# Patient Record
Sex: FEMALE | Race: WHITE | NOT HISPANIC OR LATINO | Employment: FULL TIME | URBAN - METROPOLITAN AREA
[De-identification: names, ages, dates, MRNs, and addresses within clinical notes are randomized per-mention and may not be internally consistent; named-entity substitution may affect disease eponyms.]

---

## 2017-03-16 PROBLEM — Z01.419 WELL WOMAN EXAM WITH ROUTINE GYNECOLOGICAL EXAM: Chronic | Status: ACTIVE | Noted: 2017-03-16

## 2017-06-06 ENCOUNTER — OFFICE VISIT (OUTPATIENT)
Dept: INTERNAL MEDICINE | Facility: CLINIC | Age: 37
End: 2017-06-06

## 2017-06-06 VITALS
BODY MASS INDEX: 39.71 KG/M2 | HEIGHT: 67 IN | SYSTOLIC BLOOD PRESSURE: 116 MMHG | DIASTOLIC BLOOD PRESSURE: 72 MMHG | WEIGHT: 253 LBS | TEMPERATURE: 97.4 F

## 2017-06-06 DIAGNOSIS — N80.9 ENDOMETRIOSIS: ICD-10-CM

## 2017-06-06 PROCEDURE — 99213 OFFICE O/P EST LOW 20 MIN: CPT | Performed by: FAMILY MEDICINE

## 2017-06-06 RX ORDER — CYCLOBENZAPRINE HCL 5 MG
5 TABLET ORAL 2 TIMES DAILY PRN
Qty: 60 TABLET | Refills: 5 | Status: SHIPPED | OUTPATIENT
Start: 2017-06-06 | End: 2018-06-08 | Stop reason: SDUPTHER

## 2017-06-06 RX ORDER — IBUPROFEN 800 MG/1
800 TABLET ORAL EVERY 6 HOURS PRN
Qty: 90 TABLET | Refills: 1 | Status: SHIPPED | OUTPATIENT
Start: 2017-06-06 | End: 2017-09-14 | Stop reason: SDUPTHER

## 2017-06-06 RX ORDER — TRAMADOL HYDROCHLORIDE 50 MG/1
50 TABLET ORAL EVERY 6 HOURS PRN
Qty: 90 TABLET | Refills: 3 | Status: SHIPPED | OUTPATIENT
Start: 2017-06-06 | End: 2017-09-14 | Stop reason: SDUPTHER

## 2017-06-06 RX ORDER — CLOTRIMAZOLE AND BETAMETHASONE DIPROPIONATE 10; .64 MG/G; MG/G
CREAM TOPICAL 2 TIMES DAILY
Qty: 45 G | Refills: 0 | Status: SHIPPED | OUTPATIENT
Start: 2017-06-06 | End: 2018-06-08 | Stop reason: SDUPTHER

## 2017-06-06 NOTE — PATIENT INSTRUCTIONS
1. GYN- endometriosis- discussed that the flexeril is beneficial both directly and indirectly. Will reduce the qty from 90 to 60 but she is to continue to use it as needed. Discussed that the exercise is likely improving her back and that is why she is not needing the flexeril as much as well. Will increase her tramadol qty due to the dental issues. She will be working on this over the next several months. Will write for the 800mg ibu for ease of use. Pt to cut the dose down again as soon as possible due to her stomach hx. She is reminded to start the pepcid any time she gets any GI symptoms and then to stay on the pepcid as long as she is taking the high dose of ibu.  2. RECHECK- 4mo CPE

## 2017-06-06 NOTE — PROGRESS NOTES
Subjective   April Cameron is a 37 y.o. female.     History of Present Illness   Here for routine 6mo recheck endometriosis. Last seen 12/13/16 for recheck endometriosis. Was seen 8/25/16 for CPE. Was seen 3/14/16 for ER recheck GI. ER notes 3/13/16 demo CT abd demo slightly distended gallbladder with possible wall thickening. U/S demo no gallbladder disease, + fatty liver. Neg HCG, normal CMP (except glu 124), lipase, UA.     1.GI- gastritis, NSAID induced. Improved since taking Pepcid with the prn ibu. Today she reports she has not needed any pepcid.      2.GYN- had CPE 8/25/16   Last pap: 5/2015. No h/o abnormal paps.   Discussed probable endometriosis. Pt did not go to Gyn as she found she improved adequately with prn flexeril, ibu and tramadol. Was also tried on OCP (roseline) but had emotional side effects. At last discussion, she had been able to reduce the ibu from 1500mg qd to 400mg qd prn menstruation problems. Was continued on the ibu, flexeril and tramadol combo prn. Today pt reports she is still doing well with this combo. Is not needing the flexeril very much; was taking it qhs and now is down to 1-2x/wk except when on her period and then takes it qhs. Is exercising and getting her weight down. Has lost 5 lb and is trimming. She has had some dental issues with an exposed nerve and was in process of getting a crown and was not able to complete this. Got infected and just finished abx. Declined pain meds due to the tramadol. Is taking more advil as well. Was given 800mg by dentist.    The following portions of the patient's history were reviewed and updated as appropriate: current medications, past family history, past medical history, past social history, past surgical history and problem list.    Review of Systems   Cardiovascular: Negative for chest pain.   Gastrointestinal: Negative for abdominal distention and abdominal pain.   Skin: Negative for color change.   Neurological: Negative for tremors, speech  "difficulty and headaches.   Psychiatric/Behavioral: Negative for agitation and confusion.   All other systems reviewed and are negative.        Current Outpatient Prescriptions:   •  betamethasone dipropionate (DIPROLENE) 0.05 % cream, Apply 1 gm sparingly to affected area(s) twice daily, Disp: , Rfl:   •  clotrimazole-betamethasone (LOTRISONE) 1-0.05 % cream, Apply  topically 2 (Two) Times a Day. Apply sparingly to the affected area(s) twice daily, Disp: 45 g, Rfl: 0  •  cyclobenzaprine (FLEXERIL) 5 MG tablet, Take 1 tablet by mouth 2 (Two) Times a Day As Needed (back pain). Muscle spasm, Disp: 60 tablet, Rfl: 5  •  famotidine (PEPCID) 20 MG tablet, Take 1 tablet by mouth daily. X 2 weeks and then as needed for stomach acid, Disp: 30 tablet, Rfl: 5  •  ibuprofen (ADVIL,MOTRIN) 800 MG tablet, Take 1 tablet by mouth Every 6 (Six) Hours As Needed for Mild Pain (1-3)., Disp: 90 tablet, Rfl: 1  •  traMADol (ULTRAM) 50 MG tablet, Take 1 tablet by mouth Every 6 (Six) Hours As Needed for Moderate Pain (4-6) (with periods)., Disp: 90 tablet, Rfl: 3    Objective     /72  Temp 97.4 °F (36.3 °C)  Ht 67\" (170.2 cm)  Wt 253 lb (115 kg)  BMI 39.63 kg/m2    Physical Exam   Constitutional: She is oriented to person, place, and time. She appears well-developed and well-nourished.   HENT:   Right Ear: Tympanic membrane and ear canal normal.   Left Ear: Tympanic membrane and ear canal normal.   Mouth/Throat: Oropharynx is clear and moist.   Eyes: Conjunctivae and EOM are normal. Pupils are equal, round, and reactive to light.   Neck: No thyromegaly present.   Cardiovascular: Normal rate and regular rhythm.    Pulmonary/Chest: Effort normal and breath sounds normal.   Neurological: She is alert and oriented to person, place, and time.   Skin: Skin is warm and dry.   Psychiatric: She has a normal mood and affect.   Vitals reviewed.      Assessment/Plan   April was seen today for follow-up.    Diagnoses and all orders for this " visit:    Endometriosis  -     cyclobenzaprine (FLEXERIL) 5 MG tablet; Take 1 tablet by mouth 2 (Two) Times a Day As Needed (back pain). Muscle spasm  -     ibuprofen (ADVIL,MOTRIN) 800 MG tablet; Take 1 tablet by mouth Every 6 (Six) Hours As Needed for Mild Pain (1-3).  -     traMADol (ULTRAM) 50 MG tablet; Take 1 tablet by mouth Every 6 (Six) Hours As Needed for Moderate Pain (4-6) (with periods).    Other orders  -     clotrimazole-betamethasone (LOTRISONE) 1-0.05 % cream; Apply  topically 2 (Two) Times a Day. Apply sparingly to the affected area(s) twice daily      1. GYN- endometriosis- discussed that the flexeril is beneficial both directly and indirectly. Will reduce the qty from 90 to 60 but she is to continue to use it as needed. Discussed that the exercise is likely improving her back and that is why she is not needing the flexeril as much as well. Will increase her tramadol qty due to the dental issues. She will be working on this over the next several months. Will write for the 800mg ibu for ease of use. Pt to cut the dose down again as soon as possible due to her stomach hx. She is reminded to start the pepcid any time she gets any GI symptoms and then to stay on the pepcid as long as she is taking the high dose of ibu.  2. RECHECK- 4mo CPE

## 2017-09-14 ENCOUNTER — OFFICE VISIT (OUTPATIENT)
Dept: INTERNAL MEDICINE | Facility: CLINIC | Age: 37
End: 2017-09-14

## 2017-09-14 VITALS
SYSTOLIC BLOOD PRESSURE: 116 MMHG | TEMPERATURE: 97.7 F | BODY MASS INDEX: 38.99 KG/M2 | WEIGHT: 248.4 LBS | DIASTOLIC BLOOD PRESSURE: 74 MMHG | HEIGHT: 67 IN

## 2017-09-14 DIAGNOSIS — N80.9 ENDOMETRIOSIS: ICD-10-CM

## 2017-09-14 PROCEDURE — 99395 PREV VISIT EST AGE 18-39: CPT | Performed by: FAMILY MEDICINE

## 2017-09-14 RX ORDER — TRAMADOL HYDROCHLORIDE 50 MG/1
50 TABLET ORAL EVERY 6 HOURS PRN
Qty: 90 TABLET | Refills: 3 | Status: SHIPPED | OUTPATIENT
Start: 2017-09-14 | End: 2018-01-11 | Stop reason: SDUPTHER

## 2017-09-14 RX ORDER — CLINDAMYCIN HYDROCHLORIDE 300 MG/1
CAPSULE ORAL
COMMUNITY
Start: 2017-06-21 | End: 2018-01-11

## 2017-09-14 RX ORDER — IBUPROFEN 800 MG/1
800 TABLET ORAL EVERY 6 HOURS PRN
Qty: 90 TABLET | Refills: 1 | Status: SHIPPED | OUTPATIENT
Start: 2017-09-14 | End: 2018-01-11 | Stop reason: SDUPTHER

## 2017-09-14 NOTE — PROGRESS NOTES
Subjective   Alena Barnes is a 37 y.o. female.   History of Present Illness   Here for CPE. Last seen 6/6/17 for routine 6mo recheck endometriosis. Was seen 8/25/16 for CPE. Was seen 3/14/16 for ER recheck GI. ER notes 3/13/16 demo CT abd demo slightly distended gallbladder with possible wall thickening. U/S demo no gallbladder disease, + fatty liver. Neg HCG, normal CMP (except glu 124), lipase, UA.     1.GI- gastritis, NSAID induced. Improved since taking Pepcid with the prn ibu.     2.GYN- had CPE 8/25/16   Last pap: 5/2015. No h/o abnormal paps.   LMP: 8/25/17. Discussed probable endometriosis. Pt has done well with flexeril, ibu and tramadol. Has noted tolerated OCP (emotional with last trial of roseline). Was doing well at last discussion. Today she reports she is still doing well. Gets sick but stays functional except for 1 day. Is still using the tramadol for dental issues, still has 2 root canals to go.  No current vaginal or breast c/o. Is having minor incontinence that started post partum.   No fam hx breast ca. Aunt had bilat mastectomy due to fibrocystic breast.   Last TDaP: with her pregnancy 2014   CONCERNS- is dieting and has lost down from 259 to 248 since 12/2016.     The following portions of the patient's history were reviewed and updated as appropriate: current medications, past family history, past medical history, past social history, past surgical history and problem list.    Review of Systems   Cardiovascular: Negative for chest pain.   Gastrointestinal: Negative for abdominal distention and abdominal pain.   Skin: Negative for color change.   Neurological: Negative for tremors, speech difficulty and headaches.   Psychiatric/Behavioral: Negative for agitation and confusion.   All other systems reviewed and are negative.        Current Outpatient Prescriptions:   •  betamethasone dipropionate (DIPROLENE) 0.05 % cream, Apply 1 gm sparingly to affected area(s) twice daily, Disp: , Rfl:   •   "clindamycin (CLEOCIN) 300 MG capsule, , Disp: , Rfl:   •  clotrimazole-betamethasone (LOTRISONE) 1-0.05 % cream, Apply  topically 2 (Two) Times a Day. Apply sparingly to the affected area(s) twice daily, Disp: 45 g, Rfl: 0  •  cyclobenzaprine (FLEXERIL) 5 MG tablet, Take 1 tablet by mouth 2 (Two) Times a Day As Needed (back pain). Muscle spasm, Disp: 60 tablet, Rfl: 5  •  famotidine (PEPCID) 20 MG tablet, Take 1 tablet by mouth daily. X 2 weeks and then as needed for stomach acid, Disp: 30 tablet, Rfl: 5  •  ibuprofen (ADVIL,MOTRIN) 800 MG tablet, Take 1 tablet by mouth Every 6 (Six) Hours As Needed for Mild Pain ., Disp: 90 tablet, Rfl: 1  •  traMADol (ULTRAM) 50 MG tablet, Take 1 tablet by mouth Every 6 (Six) Hours As Needed for Moderate Pain  (with periods)., Disp: 90 tablet, Rfl: 3    Objective     /74  Temp 97.7 °F (36.5 °C)  Ht 67\" (170.2 cm)  Wt 248 lb 6.4 oz (113 kg)  BMI 38.9 kg/m2    Physical Exam   Constitutional: She is oriented to person, place, and time. She appears well-developed and well-nourished.   HENT:   Head: Normocephalic.   Right Ear: Tympanic membrane and ear canal normal.   Left Ear: Tympanic membrane and ear canal normal.   Nose: Nose normal.   Mouth/Throat: Oropharynx is clear and moist.   Eyes: Conjunctivae and EOM are normal. Pupils are equal, round, and reactive to light.   Neck: Normal range of motion. Neck supple. No thyromegaly present.   Cardiovascular: Normal rate, regular rhythm and normal heart sounds.    Pulmonary/Chest: Effort normal and breath sounds normal. Right breast exhibits no mass, no nipple discharge and no skin change. Left breast exhibits no mass, no nipple discharge and no skin change.   Abdominal: Soft. Bowel sounds are normal. She exhibits no distension. There is no tenderness.   Genitourinary: Vagina normal.   Musculoskeletal: Normal range of motion.   Lymphadenopathy:     She has no cervical adenopathy.   Neurological: She is alert and oriented to " person, place, and time.   Skin: Skin is warm and dry.   Psychiatric: She has a normal mood and affect. Her behavior is normal.   Nursing note and vitals reviewed.      Reviewed the following with the patient: Discussion today with patient regarding current guidelines for timing/ frequency of paps, mammograms, colonoscopy (or cologuard), bone density tests and lab tests.     Immunizations discussed today with current recommendations advised for DTaP, Zostavax, Pneumovax and Prevnar 13.    Appropriate diet and stretching discussed with handouts provided.      Assessment/Plan   April was seen today for annual exam.    Diagnoses and all orders for this visit:    Endometriosis  -     traMADol (ULTRAM) 50 MG tablet; Take 1 tablet by mouth Every 6 (Six) Hours As Needed for Moderate Pain  (with periods).  -     ibuprofen (ADVIL,MOTRIN) 800 MG tablet; Take 1 tablet by mouth Every 6 (Six) Hours As Needed for Mild Pain .      1. CPE- pap due next year. RF meds for endometriosis today. Will start labs next   2. RECHECK- 6mo routine

## 2017-09-14 NOTE — PATIENT INSTRUCTIONS
1. CPE- pap due next year. RF meds for endometriosis today. Will start labs next   2. RECHECK- 6mo routine

## 2018-01-11 ENCOUNTER — OFFICE VISIT (OUTPATIENT)
Dept: INTERNAL MEDICINE | Facility: CLINIC | Age: 38
End: 2018-01-11

## 2018-01-11 VITALS
DIASTOLIC BLOOD PRESSURE: 84 MMHG | HEIGHT: 67 IN | TEMPERATURE: 97.3 F | WEIGHT: 257.2 LBS | SYSTOLIC BLOOD PRESSURE: 122 MMHG | BODY MASS INDEX: 40.37 KG/M2

## 2018-01-11 DIAGNOSIS — N80.9 ENDOMETRIOSIS: Primary | ICD-10-CM

## 2018-01-11 PROBLEM — Z01.419 WELL WOMAN EXAM WITH ROUTINE GYNECOLOGICAL EXAM: Chronic | Status: RESOLVED | Noted: 2017-03-16 | Resolved: 2018-01-11

## 2018-01-11 PROCEDURE — 99213 OFFICE O/P EST LOW 20 MIN: CPT | Performed by: FAMILY MEDICINE

## 2018-01-11 RX ORDER — IBUPROFEN 800 MG/1
800 TABLET ORAL EVERY 6 HOURS PRN
Qty: 90 TABLET | Refills: 3 | Status: SHIPPED | OUTPATIENT
Start: 2018-01-11 | End: 2018-06-08 | Stop reason: SDUPTHER

## 2018-01-11 RX ORDER — TRAMADOL HYDROCHLORIDE 50 MG/1
50 TABLET ORAL EVERY 6 HOURS PRN
Qty: 90 TABLET | Refills: 5 | Status: SHIPPED | OUTPATIENT
Start: 2018-01-11 | End: 2018-06-08 | Stop reason: SDUPTHER

## 2018-01-11 NOTE — PROGRESS NOTES
Subjective   April Cameron is a 37 y.o. female.     History of Present Illness   Here for 6mo routine. Last seen 9/14/17 for CPE. Was seen 6/6/17 for routine 6mo recheck endometriosis.Was seen 3/14/16 for ER recheck GI. ER notes 3/13/16 demo CT abd demo slightly distended gallbladder with possible wall thickening. U/S demo no gallbladder disease, + fatty liver. Neg HCG, normal CMP (except glu 124), lipase, UA.     GI- gastritis, NSAID induced. Improved since taking Pepcid with the prn ibu.     GYN- endometriosis. Currently on ibu, flexeril and tramadol. Pt has been able to reduce the ibu from 1500mg qd to 400mg qd prn menstruation problems since on flexeril and tramadol. GI issues have improved with this. Has also taken the flexeril for back pain, usually 1-2x/wk. Has been gradually improving with exercise and weight loss. Was back up to 800mg ibu at last visit due to dental problems. Was reminded to use the Pepcid with this prn. Today pt reports she is on her period today. Is having the body aches, etc. Is out of tramadol. Does not feel she is acutely sick. Overall is still improved. On additional discussion, she has tried most OCP, including progesterone only. Has a h/o cystic acne, treated with acutane in past. Does get rare acne now.    ORTHO- pt has ganglion at 1st metacarpal left hand. Education provided.     The following portions of the patient's history were reviewed and updated as appropriate: current medications, past family history, past medical history, past social history, past surgical history and problem list.    Review of Systems   Cardiovascular: Negative for chest pain.   Gastrointestinal: Negative for abdominal distention and abdominal pain.   Skin: Negative for color change.   Neurological: Negative for tremors, speech difficulty and headaches.   Psychiatric/Behavioral: Negative for agitation and confusion.   All other systems reviewed and are negative.        Current Outpatient Prescriptions:  "  •  betamethasone dipropionate (DIPROLENE) 0.05 % cream, Apply 1 gm sparingly to affected area(s) twice daily, Disp: , Rfl:   •  clotrimazole-betamethasone (LOTRISONE) 1-0.05 % cream, Apply  topically 2 (Two) Times a Day. Apply sparingly to the affected area(s) twice daily, Disp: 45 g, Rfl: 0  •  cyclobenzaprine (FLEXERIL) 5 MG tablet, Take 1 tablet by mouth 2 (Two) Times a Day As Needed (back pain). Muscle spasm, Disp: 60 tablet, Rfl: 5  •  famotidine (PEPCID) 20 MG tablet, Take 1 tablet by mouth daily. X 2 weeks and then as needed for stomach acid, Disp: 30 tablet, Rfl: 5  •  ibuprofen (ADVIL,MOTRIN) 800 MG tablet, Take 1 tablet by mouth Every 6 (Six) Hours As Needed for Mild Pain ., Disp: 90 tablet, Rfl: 1  •  traMADol (ULTRAM) 50 MG tablet, Take 1 tablet by mouth Every 6 (Six) Hours As Needed for Moderate Pain  (with periods)., Disp: 90 tablet, Rfl: 5    Objective     /84  Temp 97.3 °F (36.3 °C)  Ht 170.2 cm (67\")  Wt 117 kg (257 lb 3.2 oz)  BMI 40.28 kg/m2    Physical Exam   Constitutional: She is oriented to person, place, and time. She appears well-developed and well-nourished.   HENT:   Right Ear: Tympanic membrane and ear canal normal.   Left Ear: Tympanic membrane and ear canal normal.   Mouth/Throat: Oropharynx is clear and moist.   Eyes: Conjunctivae and EOM are normal. Pupils are equal, round, and reactive to light.   Neck: No thyromegaly present.   Cardiovascular: Normal rate and regular rhythm.    Pulmonary/Chest: Effort normal and breath sounds normal.   Neurological: She is alert and oriented to person, place, and time.   Skin: Skin is warm and dry.   Psychiatric: She has a normal mood and affect.   Vitals reviewed.      Assessment/Plan   April was seen today for follow-up.    Diagnoses and all orders for this visit:    Endometriosis  -     traMADol (ULTRAM) 50 MG tablet; Take 1 tablet by mouth Every 6 (Six) Hours As Needed for Moderate Pain  (with periods).      1. GYN- endometriosis- " stable. Will RF tramadol and ibu today. Also discussed spironolactone as an option. Discussed that this would be more for her skin issues but could be helpful. If she decides she wants to try this, she can call for a Rx and then we will keep her CPE.  2. RECHECK- 6mo CPE

## 2018-01-11 NOTE — PATIENT INSTRUCTIONS
1. GYN- endometriosis- stable. Will RF tramadol and ibu today. Also discussed spironolactone as an option. Discussed that this would be more for her skin issues but could be helpful. If she decides she wants to try this, she can call for a Rx and then we will keep her CPE.  2. RECHECK- 6mo CPE

## 2018-02-23 ENCOUNTER — TELEPHONE (OUTPATIENT)
Dept: INTERNAL MEDICINE | Facility: CLINIC | Age: 38
End: 2018-02-23

## 2018-02-23 RX ORDER — OSELTAMIVIR PHOSPHATE 75 MG/1
75 CAPSULE ORAL DAILY
Qty: 10 CAPSULE | Refills: 0 | Status: SHIPPED | OUTPATIENT
Start: 2018-02-23 | End: 2018-03-05

## 2018-02-23 NOTE — TELEPHONE ENCOUNTER
PATIENTS DAUGHTER TESTED POSITIVE FOR FLU B AND SHE WANTED TO KNOW IF YOU COULD CALL HER IN SOME TAMIFLU?

## 2018-02-23 NOTE — TELEPHONE ENCOUNTER
Per NAHUM I have sent in Tamiflu via erx to pharmacy. Pharmacy will notify pt when ready for . DENISSE

## 2018-06-08 ENCOUNTER — OFFICE VISIT (OUTPATIENT)
Dept: INTERNAL MEDICINE | Facility: CLINIC | Age: 38
End: 2018-06-08

## 2018-06-08 VITALS
BODY MASS INDEX: 39.68 KG/M2 | TEMPERATURE: 97.8 F | DIASTOLIC BLOOD PRESSURE: 72 MMHG | SYSTOLIC BLOOD PRESSURE: 118 MMHG | WEIGHT: 252.8 LBS | HEIGHT: 67 IN

## 2018-06-08 DIAGNOSIS — L30.9 ECZEMA, UNSPECIFIED TYPE: ICD-10-CM

## 2018-06-08 DIAGNOSIS — N80.9 ENDOMETRIOSIS: Primary | ICD-10-CM

## 2018-06-08 PROCEDURE — 99213 OFFICE O/P EST LOW 20 MIN: CPT | Performed by: FAMILY MEDICINE

## 2018-06-08 RX ORDER — IBUPROFEN 800 MG/1
800 TABLET ORAL EVERY 6 HOURS PRN
Qty: 90 TABLET | Refills: 3 | Status: SHIPPED | OUTPATIENT
Start: 2018-06-08 | End: 2018-10-26 | Stop reason: SDUPTHER

## 2018-06-08 RX ORDER — TRAMADOL HYDROCHLORIDE 50 MG/1
50 TABLET ORAL EVERY 6 HOURS PRN
Qty: 90 TABLET | Refills: 3 | Status: SHIPPED | OUTPATIENT
Start: 2018-06-08 | End: 2018-10-26 | Stop reason: SDUPTHER

## 2018-06-08 RX ORDER — CLOTRIMAZOLE AND BETAMETHASONE DIPROPIONATE 10; .64 MG/G; MG/G
CREAM TOPICAL EVERY 12 HOURS SCHEDULED
Qty: 45 G | Refills: 0 | Status: SHIPPED | OUTPATIENT
Start: 2018-06-08 | End: 2018-10-26 | Stop reason: SDUPTHER

## 2018-06-08 RX ORDER — CYCLOBENZAPRINE HCL 5 MG
5 TABLET ORAL 2 TIMES DAILY PRN
Qty: 60 TABLET | Refills: 3 | Status: SHIPPED | OUTPATIENT
Start: 2018-06-08 | End: 2018-10-26 | Stop reason: SDUPTHER

## 2018-06-08 NOTE — PATIENT INSTRUCTIONS
1. GYN- endometriosis- stable. RF meds x4mo today and then will do 6mo at her CPE  2. DERM- discussed candida vs tinea vs eczema- advised Gold Bond or Desitin for yeast prevention. Advised topical steroid and olive oil for dry skin or eczema.   3. RECHECK- will postpone her CPE from Sept to Oct.

## 2018-06-08 NOTE — PROGRESS NOTES
Subjective   April Cameron is a 38 y.o. female.     History of Present Illness   Here for 6mo routine. Last seen 1/11/18 for routine. Was seen 9/14/17 for CPE. Was seen 6/6/17 for routine 6mo recheck endometriosis.Was seen 3/14/16 for ER recheck GI. ER notes 3/13/16 demo CT abd demo slightly distended gallbladder with possible wall thickening. U/S demo no gallbladder disease, + fatty liver. Neg HCG, normal CMP (except glu 124), lipase, UA.     GI- gastritis, NSAID induced. Improved since taking Pepcid with the prn ibu.     GYN- endometriosis. Intolerant to OCP. Currently on periodic ibu, flexeril and tramadol. Pt has been able to reduce the ibu from 1500mg qd to 400mg qd prn menstruation problems since on flexeril and tramadol. GI issues have improved with this. Has also taken the flexeril prn back pain, usually 1-2x/wk. Has been gradually improving with exercise and weight loss. Was out of tramadol at her last visit and not doing well. Was given RF x6mo.  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 Will be getting insurance as of October. Her medicaid will stop before then. She is doing well since she is back on the combo tramadol, ibu 800 and flexeril.     The following portions of the patient's history were reviewed and updated as appropriate: current medications, past family history, past medical history, past social history, past surgical history and problem list.    Review of Systems   Cardiovascular: Negative for chest pain.   Gastrointestinal: Negative for abdominal distention and abdominal pain.   Skin: Negative for color change.   Neurological: Negative for tremors, speech difficulty and headaches.   Psychiatric/Behavioral: Negative for agitation and confusion.   All other systems reviewed and are negative.        Current Outpatient Prescriptions:   •  betamethasone dipropionate (DIPROLENE) 0.05 % cream, Apply 1 gm sparingly to affected area(s) twice daily, Disp: , Rfl:   •   "clotrimazole-betamethasone (LOTRISONE) 1-0.05 % cream, Apply  topically Every 12 (Twelve) Hours. Apply sparingly to the affected area(s) twice daily, Disp: 45 g, Rfl: 0  •  cyclobenzaprine (FLEXERIL) 5 MG tablet, Take 1 tablet by mouth 2 (Two) Times a Day As Needed (back pain). Muscle spasm, Disp: 60 tablet, Rfl: 3  •  famotidine (PEPCID) 20 MG tablet, Take 1 tablet by mouth daily. X 2 weeks and then as needed for stomach acid, Disp: 30 tablet, Rfl: 5  •  ibuprofen (ADVIL,MOTRIN) 800 MG tablet, Take 1 tablet by mouth Every 6 (Six) Hours As Needed for Mild Pain ., Disp: 90 tablet, Rfl: 3  •  traMADol (ULTRAM) 50 MG tablet, Take 1 tablet by mouth Every 6 (Six) Hours As Needed for Moderate Pain  (with periods)., Disp: 90 tablet, Rfl: 3    Objective     /72   Temp 97.8 °F (36.6 °C)   Ht 170.2 cm (67\")   Wt 115 kg (252 lb 12.8 oz)   BMI 39.59 kg/m²     Physical Exam   Constitutional: She is oriented to person, place, and time. She appears well-developed and well-nourished.   HENT:   Right Ear: Tympanic membrane and ear canal normal.   Left Ear: Tympanic membrane and ear canal normal.   Mouth/Throat: Oropharynx is clear and moist.   Eyes: Conjunctivae and EOM are normal. Pupils are equal, round, and reactive to light.   Neck: No thyromegaly present.   Cardiovascular: Normal rate and regular rhythm.    Pulmonary/Chest: Effort normal and breath sounds normal.   Neurological: She is alert and oriented to person, place, and time.   Skin: Skin is warm and dry.   Psychiatric: She has a normal mood and affect.   Vitals reviewed.      Assessment/Plan   April was seen today for follow-up.    Diagnoses and all orders for this visit:    Endometriosis  -     cyclobenzaprine (FLEXERIL) 5 MG tablet; Take 1 tablet by mouth 2 (Two) Times a Day As Needed (back pain). Muscle spasm  -     traMADol (ULTRAM) 50 MG tablet; Take 1 tablet by mouth Every 6 (Six) Hours As Needed for Moderate Pain  (with periods).  -     ibuprofen " (ADVIL,MOTRIN) 800 MG tablet; Take 1 tablet by mouth Every 6 (Six) Hours As Needed for Mild Pain .    Eczema, unspecified type  -     clotrimazole-betamethasone (LOTRISONE) 1-0.05 % cream; Apply  topically Every 12 (Twelve) Hours. Apply sparingly to the affected area(s) twice daily        1. GYN- endometriosis- stable. RF meds x4mo today and then will do 6mo at her CPE  2. DERM- discussed candida vs tinea vs eczema- advised Gold Bond or Desitin for yeast prevention. Advised topical steroid and olive oil for dry skin or eczema.   3. RECHECK- will postpone her CPE from Sept to Oct.

## 2018-10-26 ENCOUNTER — OFFICE VISIT (OUTPATIENT)
Dept: INTERNAL MEDICINE | Facility: CLINIC | Age: 38
End: 2018-10-26

## 2018-10-26 VITALS
DIASTOLIC BLOOD PRESSURE: 76 MMHG | TEMPERATURE: 98.1 F | HEIGHT: 67 IN | WEIGHT: 259 LBS | BODY MASS INDEX: 40.65 KG/M2 | SYSTOLIC BLOOD PRESSURE: 120 MMHG

## 2018-10-26 DIAGNOSIS — N80.9 ENDOMETRIOSIS: ICD-10-CM

## 2018-10-26 DIAGNOSIS — Z00.00 ANNUAL PHYSICAL EXAM: Primary | ICD-10-CM

## 2018-10-26 DIAGNOSIS — L30.9 ECZEMA, UNSPECIFIED TYPE: ICD-10-CM

## 2018-10-26 DIAGNOSIS — N90.89 SKIN TAG OF VULVA: ICD-10-CM

## 2018-10-26 LAB
25(OH)D3 SERPL-MCNC: 6 NG/ML
ALBUMIN SERPL-MCNC: 4.55 G/DL (ref 3.2–4.8)
ALBUMIN/GLOB SERPL: 2.2 G/DL (ref 1.5–2.5)
ALP SERPL-CCNC: 77 U/L (ref 25–100)
ALT SERPL W P-5'-P-CCNC: 22 U/L (ref 7–40)
ANION GAP SERPL CALCULATED.3IONS-SCNC: 8 MMOL/L (ref 3–11)
ARTICHOKE IGE QN: 125 MG/DL (ref 0–130)
AST SERPL-CCNC: 20 U/L (ref 0–33)
BASOPHILS # BLD AUTO: 0.02 10*3/MM3 (ref 0–0.2)
BASOPHILS NFR BLD AUTO: 0.3 % (ref 0–1)
BILIRUB SERPL-MCNC: 0.5 MG/DL (ref 0.3–1.2)
BUN BLD-MCNC: 12 MG/DL (ref 9–23)
BUN/CREAT SERPL: 21.8 (ref 7–25)
CALCIUM SPEC-SCNC: 9.3 MG/DL (ref 8.7–10.4)
CHLORIDE SERPL-SCNC: 104 MMOL/L (ref 99–109)
CHOLEST SERPL-MCNC: 193 MG/DL (ref 0–200)
CO2 SERPL-SCNC: 26 MMOL/L (ref 20–31)
CREAT BLD-MCNC: 0.55 MG/DL (ref 0.6–1.3)
DEPRECATED RDW RBC AUTO: 45.9 FL (ref 37–54)
EOSINOPHIL # BLD AUTO: 0.08 10*3/MM3 (ref 0–0.3)
EOSINOPHIL NFR BLD AUTO: 1.3 % (ref 0–3)
ERYTHROCYTE [DISTWIDTH] IN BLOOD BY AUTOMATED COUNT: 13.5 % (ref 11.3–14.5)
GFR SERPL CREATININE-BSD FRML MDRD: 122 ML/MIN/1.73
GLOBULIN UR ELPH-MCNC: 2.1 GM/DL
GLUCOSE BLD-MCNC: 76 MG/DL (ref 70–100)
HCT VFR BLD AUTO: 40.9 % (ref 34.5–44)
HDLC SERPL-MCNC: 50 MG/DL (ref 40–60)
HGB BLD-MCNC: 13.1 G/DL (ref 11.5–15.5)
IMM GRANULOCYTES # BLD: 0.01 10*3/MM3 (ref 0–0.03)
IMM GRANULOCYTES NFR BLD: 0.2 % (ref 0–0.6)
LYMPHOCYTES # BLD AUTO: 1.96 10*3/MM3 (ref 0.6–4.8)
LYMPHOCYTES NFR BLD AUTO: 31.7 % (ref 24–44)
MCH RBC QN AUTO: 29.9 PG (ref 27–31)
MCHC RBC AUTO-ENTMCNC: 32 G/DL (ref 32–36)
MCV RBC AUTO: 93.4 FL (ref 80–99)
MONOCYTES # BLD AUTO: 0.49 10*3/MM3 (ref 0–1)
MONOCYTES NFR BLD AUTO: 7.9 % (ref 0–12)
NEUTROPHILS # BLD AUTO: 3.63 10*3/MM3 (ref 1.5–8.3)
NEUTROPHILS NFR BLD AUTO: 58.6 % (ref 41–71)
PLATELET # BLD AUTO: 278 10*3/MM3 (ref 150–450)
PMV BLD AUTO: 12.3 FL (ref 6–12)
POTASSIUM BLD-SCNC: 4 MMOL/L (ref 3.5–5.5)
PROT SERPL-MCNC: 6.6 G/DL (ref 5.7–8.2)
RBC # BLD AUTO: 4.38 10*6/MM3 (ref 3.89–5.14)
SODIUM BLD-SCNC: 138 MMOL/L (ref 132–146)
TRIGL SERPL-MCNC: 284 MG/DL (ref 0–150)
VIT B12 BLD-MCNC: 449 PG/ML (ref 211–911)
WBC NRBC COR # BLD: 6.19 10*3/MM3 (ref 3.5–10.8)

## 2018-10-26 PROCEDURE — 84402 ASSAY OF FREE TESTOSTERONE: CPT | Performed by: FAMILY MEDICINE

## 2018-10-26 PROCEDURE — 80061 LIPID PANEL: CPT | Performed by: FAMILY MEDICINE

## 2018-10-26 PROCEDURE — 82306 VITAMIN D 25 HYDROXY: CPT | Performed by: FAMILY MEDICINE

## 2018-10-26 PROCEDURE — 99396 PREV VISIT EST AGE 40-64: CPT | Performed by: FAMILY MEDICINE

## 2018-10-26 PROCEDURE — 85025 COMPLETE CBC W/AUTO DIFF WBC: CPT | Performed by: FAMILY MEDICINE

## 2018-10-26 PROCEDURE — 82607 VITAMIN B-12: CPT | Performed by: FAMILY MEDICINE

## 2018-10-26 PROCEDURE — 80053 COMPREHEN METABOLIC PANEL: CPT | Performed by: FAMILY MEDICINE

## 2018-10-26 PROCEDURE — 84403 ASSAY OF TOTAL TESTOSTERONE: CPT | Performed by: FAMILY MEDICINE

## 2018-10-26 RX ORDER — CYCLOBENZAPRINE HCL 5 MG
5 TABLET ORAL 2 TIMES DAILY PRN
Qty: 60 TABLET | Refills: 3 | Status: SHIPPED | OUTPATIENT
Start: 2018-10-26 | End: 2019-03-19 | Stop reason: SDUPTHER

## 2018-10-26 RX ORDER — CLOTRIMAZOLE AND BETAMETHASONE DIPROPIONATE 10; .64 MG/G; MG/G
CREAM TOPICAL EVERY 12 HOURS SCHEDULED
Qty: 45 G | Refills: 0 | Status: SHIPPED | OUTPATIENT
Start: 2018-10-26 | End: 2019-04-26 | Stop reason: SDUPTHER

## 2018-10-26 RX ORDER — IBUPROFEN 800 MG/1
800 TABLET ORAL EVERY 6 HOURS PRN
Qty: 90 TABLET | Refills: 3 | Status: SHIPPED | OUTPATIENT
Start: 2018-10-26 | End: 2019-02-21 | Stop reason: SDUPTHER

## 2018-10-26 RX ORDER — TRAMADOL HYDROCHLORIDE 50 MG/1
50 TABLET ORAL EVERY 6 HOURS PRN
Qty: 90 TABLET | Refills: 3 | Status: SHIPPED | OUTPATIENT
Start: 2018-10-26 | End: 2019-02-21 | Stop reason: SDUPTHER

## 2018-10-26 NOTE — PROGRESS NOTES
Subjective   April Cameron is a 38 y.o. female.  History of Present Illness   Here for CPE.  Last seen 6/8/18 for 6mo routine. Last seen 1/11/18 for routine. Was seen 9/14/17 for CPE. Was seen 3/14/16 for ER recheck GI. ER notes 3/13/16 demo CT abd demo slightly distended gallbladder with possible wall thickening. U/S demo no gallbladder disease, + fatty liver. Neg HCG, normal CMP (except glu 124), lipase, UA.     1.GI- gastritis, NSAID induced. Improved since taking Pepcid with the prn ibu.     2.CPE- previous done 9/14/17 and 8/25/16   Last pap: 5/2015. No h/o abnormal paps. PAP DUE  GYN- endometriosis. Intolerant to OCP. Currently on periodic ibu, flexeril and tramadol. Pt has been able to reduce the ibu from 1500mg qd to 400mg qd prn menstruation problems since on flexeril and tramadol. GI issues have improved with this. Will also use the flexeril on occasion for simple back pain. Today pt reports she is still doing well from a pain standpoint with this combo (only has 1 days that she is dysfunctional). However, she is concerned that she is having ovarian cysts. Had a ruptured cyst as a teen and feels this may have happened again recently. She is having pain mid cycle now.  No current breast concerns. Had minor incontinence that started post partum; today she reports this is improved. Has been using a vaginal powder and now is having itching in the groin area; concerned that she is reacting to the powder. Has a painful skin tag that is catching on her underwear  No fam hx breast ca. Aunt had bilat mastectomy due to fibrocystic breast.   RESP- hx smoking 15 yr, stopped 2008  Last TDaP: with her pregnancy 2014   CONCERNS- is back up to her previous weight. This all started post partum 4yr ago.     The following portions of the patient's history were reviewed and updated as appropriate: current medications, past family history, past medical history, past social history, past surgical history and problem  "list.    Review of Systems   Cardiovascular: Negative for chest pain.   Gastrointestinal: Negative for abdominal distention and abdominal pain.   Skin: Negative for color change.   Neurological: Negative for tremors, speech difficulty and headaches.   Psychiatric/Behavioral: Negative for agitation and confusion.   All other systems reviewed and are negative.        Current Outpatient Prescriptions:   •  betamethasone dipropionate (DIPROLENE) 0.05 % cream, Apply 1 gm sparingly to affected area(s) twice daily, Disp: , Rfl:   •  clotrimazole-betamethasone (LOTRISONE) 1-0.05 % cream, Apply  topically to the appropriate area as directed Every 12 (Twelve) Hours. Apply sparingly to the affected area(s) twice daily, Disp: 45 g, Rfl: 0  •  cyclobenzaprine (FLEXERIL) 5 MG tablet, Take 1 tablet by mouth 2 (Two) Times a Day As Needed (back pain). Muscle spasm, Disp: 60 tablet, Rfl: 3  •  famotidine (PEPCID) 20 MG tablet, Take 1 tablet by mouth daily. X 2 weeks and then as needed for stomach acid, Disp: 30 tablet, Rfl: 5  •  ibuprofen (ADVIL,MOTRIN) 800 MG tablet, Take 1 tablet by mouth Every 6 (Six) Hours As Needed for Mild Pain ., Disp: 90 tablet, Rfl: 3  •  traMADol (ULTRAM) 50 MG tablet, Take 1 tablet by mouth Every 6 (Six) Hours As Needed for Moderate Pain  (with periods)., Disp: 90 tablet, Rfl: 3    Objective     /76   Temp 98.1 °F (36.7 °C)   Ht 170.2 cm (67\")   Wt 117 kg (259 lb)   BMI 40.57 kg/m²     Physical Exam   Constitutional: She is oriented to person, place, and time. She appears well-developed and well-nourished.   HENT:   Head: Normocephalic.   Right Ear: Tympanic membrane and ear canal normal.   Left Ear: Tympanic membrane and ear canal normal.   Nose: Nose normal.   Mouth/Throat: Oropharynx is clear and moist.   Eyes: Pupils are equal, round, and reactive to light. Conjunctivae and EOM are normal.   Neck: Normal range of motion. Neck supple. No thyromegaly present.   Cardiovascular: Normal rate, " regular rhythm and normal heart sounds.    Pulmonary/Chest: Effort normal and breath sounds normal. Right breast exhibits no mass, no nipple discharge and no skin change. Left breast exhibits no mass, no nipple discharge and no skin change.   Abdominal: Soft. Bowel sounds are normal. She exhibits no distension. There is no tenderness.   Genitourinary: Vagina normal.   Musculoskeletal: Normal range of motion.   Lymphadenopathy:     She has no cervical adenopathy.   Neurological: She is alert and oriented to person, place, and time.   Skin: Skin is warm and dry.   Psychiatric: She has a normal mood and affect. Her behavior is normal.   Nursing note and vitals reviewed.      Reviewed the following with the patient: Discussion today with patient regarding current guidelines for timing/ frequency of paps, mammograms, colonoscopy (or cologuard), bone density tests and lab tests.     Immunizations discussed today with current recommendations advised for DTaP, Zostavax, Pneumovax and Prevnar 13.    Appropriate diet and stretching discussed with handouts provided.      Assessment/Plan   April was seen today for annual exam.    Diagnoses and all orders for this visit:    Annual physical exam  -     Liquid-based Pap Smear, Screening  -     CBC & Differential  -     Comprehensive Metabolic Panel  -     Vitamin B12  -     Vitamin D 25 Hydroxy  -     Testosterone, Free, Total  -     Lipid Panel    Endometriosis  -     cyclobenzaprine (FLEXERIL) 5 MG tablet; Take 1 tablet by mouth 2 (Two) Times a Day As Needed (back pain). Muscle spasm  -     ibuprofen (ADVIL,MOTRIN) 800 MG tablet; Take 1 tablet by mouth Every 6 (Six) Hours As Needed for Mild Pain .  -     traMADol (ULTRAM) 50 MG tablet; Take 1 tablet by mouth Every 6 (Six) Hours As Needed for Moderate Pain  (with periods).    Eczema, unspecified type  -     clotrimazole-betamethasone (LOTRISONE) 1-0.05 % cream; Apply  topically to the appropriate area as directed Every 12  (Twelve) Hours. Apply sparingly to the affected area(s) twice daily        1. CPE- pap today and will do the 5yr option with the HPV screen. Advised to start her mammo at 47yo. Will treat with lotrisone. Advised to avoid powders. Non fasting labs today. If lipid elevated, will redo fasting.  2. GYN- suspect endometriosis. Will RF her current meds x6mo. Will refer to Gyn to further eval the concern re endometriosis and ovarian cysts and discuss her options as she does not tolerate typical OCP.   3. DERM- skin tag that is painful. Will scheduled snip excision  4. RECHECK- 6mo routine, separate appt for snip excision

## 2018-10-26 NOTE — PATIENT INSTRUCTIONS
1. CPE- pap today and will do the 5yr option with the HPV screen. Advised to start her mammo at 49yo. Will treat with lotrisone. Advised to avoid powders. Non fasting labs today. If lipid elevated, will redo fasting.  2. GYN- suspect endometriosis. Will RF her current meds x6mo. Will refer to Gyn to further eval the concern re endometriosis and ovarian cysts and discuss her options as she does not tolerate typical OCP.   3. DERM- skin tag that is painful. Will scheduled snip excision  4. RECHECK- 6mo routine, separate appt for snip excision

## 2018-10-28 LAB
TESTOST FREE SERPL-MCNC: 0.8 PG/ML (ref 0–4.2)
TESTOST SERPL-MCNC: 11 NG/DL (ref 8–48)

## 2019-02-21 DIAGNOSIS — N80.9 ENDOMETRIOSIS: ICD-10-CM

## 2019-02-21 NOTE — TELEPHONE ENCOUNTER
Pt stated that she was out of Ibuprofen and ultram and she is needing her prescriptions refilled.     Pt would like for you to call her when they have been refilled.      Thank you,

## 2019-02-22 RX ORDER — TRAMADOL HYDROCHLORIDE 50 MG/1
TABLET ORAL
Qty: 90 TABLET | Refills: 2 | Status: SHIPPED | OUTPATIENT
Start: 2019-02-22 | End: 2019-04-26 | Stop reason: SDUPTHER

## 2019-02-22 RX ORDER — IBUPROFEN 800 MG/1
TABLET ORAL
Qty: 90 TABLET | Refills: 2 | Status: SHIPPED | OUTPATIENT
Start: 2019-02-22 | End: 2019-04-26 | Stop reason: SDUPTHER

## 2019-03-19 DIAGNOSIS — N80.9 ENDOMETRIOSIS: ICD-10-CM

## 2019-03-19 RX ORDER — CYCLOBENZAPRINE HCL 5 MG
TABLET ORAL
Qty: 60 TABLET | Refills: 2 | Status: SHIPPED | OUTPATIENT
Start: 2019-03-19 | End: 2019-04-26 | Stop reason: SDUPTHER

## 2019-04-26 ENCOUNTER — OFFICE VISIT (OUTPATIENT)
Dept: INTERNAL MEDICINE | Facility: CLINIC | Age: 39
End: 2019-04-26

## 2019-04-26 VITALS
SYSTOLIC BLOOD PRESSURE: 128 MMHG | DIASTOLIC BLOOD PRESSURE: 70 MMHG | HEIGHT: 67 IN | BODY MASS INDEX: 41.5 KG/M2 | WEIGHT: 264.4 LBS | TEMPERATURE: 97 F

## 2019-04-26 DIAGNOSIS — L30.9 ECZEMA, UNSPECIFIED TYPE: ICD-10-CM

## 2019-04-26 DIAGNOSIS — N80.9 ENDOMETRIOSIS: Primary | ICD-10-CM

## 2019-04-26 DIAGNOSIS — M51.36 DEGENERATION OF INTERVERTEBRAL DISC OF LUMBAR REGION: ICD-10-CM

## 2019-04-26 DIAGNOSIS — J30.9 CHRONIC ALLERGIC RHINITIS: ICD-10-CM

## 2019-04-26 DIAGNOSIS — K29.00 ACUTE SUPERFICIAL GASTRITIS WITHOUT HEMORRHAGE: ICD-10-CM

## 2019-04-26 LAB
EXPIRATION DATE: NORMAL
INTERNAL CONTROL: NORMAL
Lab: NORMAL
S PYO AG THROAT QL: NEGATIVE

## 2019-04-26 PROCEDURE — 87880 STREP A ASSAY W/OPTIC: CPT | Performed by: FAMILY MEDICINE

## 2019-04-26 PROCEDURE — 99214 OFFICE O/P EST MOD 30 MIN: CPT | Performed by: FAMILY MEDICINE

## 2019-04-26 RX ORDER — CYCLOBENZAPRINE HCL 5 MG
5 TABLET ORAL 2 TIMES DAILY PRN
Qty: 60 TABLET | Refills: 2 | Status: SHIPPED | OUTPATIENT
Start: 2019-04-26 | End: 2019-08-23 | Stop reason: SDUPTHER

## 2019-04-26 RX ORDER — FLUTICASONE PROPIONATE 50 MCG
2 SPRAY, SUSPENSION (ML) NASAL DAILY
Qty: 16 G | Refills: 1 | Status: SHIPPED | OUTPATIENT
Start: 2019-04-26 | End: 2019-08-23 | Stop reason: SDUPTHER

## 2019-04-26 RX ORDER — CLOTRIMAZOLE AND BETAMETHASONE DIPROPIONATE 10; .64 MG/G; MG/G
CREAM TOPICAL EVERY 12 HOURS SCHEDULED
Qty: 45 G | Refills: 0 | Status: SHIPPED | OUTPATIENT
Start: 2019-04-26 | End: 2019-10-17 | Stop reason: SDUPTHER

## 2019-04-26 RX ORDER — IBUPROFEN 800 MG/1
800 TABLET ORAL EVERY 6 HOURS PRN
Qty: 90 TABLET | Refills: 2 | Status: SHIPPED | OUTPATIENT
Start: 2019-04-26 | End: 2019-08-23 | Stop reason: SDUPTHER

## 2019-04-26 RX ORDER — TRAMADOL HYDROCHLORIDE 50 MG/1
TABLET ORAL
Qty: 90 TABLET | Refills: 2 | Status: SHIPPED | OUTPATIENT
Start: 2019-04-26 | End: 2019-08-23 | Stop reason: SDUPTHER

## 2019-04-26 NOTE — PROGRESS NOTES
Subjective   April Cameron is a 39 y.o. female.     History of Present Illness   Here for 6mo routine. Last seen 10/26/18 for CPE.  Lab 10/26/18 demo normal CBC, CMP, TSH, B12, testosterone level and lipid with , tg 204, HDL 50, . Had vit D 6; advised 5k.     1.GI- gastritis, NSAID induced. Improved since taking Pepcid with the prn ibu. Today pt reports no issues in a long time. Has not even had to take the pepcid with the ibu.      2.GYN- endometriosis. Intolerant to OCP. Currently on periodic ibu, flexeril and tramadol. Pt has been able to reduce the ibu from 1500mg qd to 400mg qd prn menstruation problems since on flexeril and tramadol. GI issues have improved with this. Will also use the flexeril on occasion for simple back pain. Was stable except for concern re ovarian cysts at her last visit. Was referred to Gyn. Today pt reports she is still doing very well with this. Still has 1-2 days of dysfunction but this is acceptable. Has stopped using the powder she was using in the groin area and her skin there cleared up.     3. DERM- athletes foot. Today pt reports she realized that the rash on her feet was not just eczema. Uses lotrisone qd to keep it controlled as it keeps coming back.     4. RESP- today pt reports her throat and right ear have been hurting for 2wk. Is worse at night. No hearing loss. No other sinus symptoms. Does not feel sick. Her  had strep. Had allergy shots as a kid. No issues since then until now.    The following portions of the patient's history were reviewed and updated as appropriate: current medications, past family history, past medical history, past social history, past surgical history and problem list.    Review of Systems   HENT: Positive for ear pain (on left radiates down throat).    Cardiovascular: Negative for chest pain.   Gastrointestinal: Negative for abdominal distention and abdominal pain.   Skin: Negative for color change.   Neurological: Negative for  "tremors, speech difficulty and headaches.   Psychiatric/Behavioral: Negative for agitation and confusion.   All other systems reviewed and are negative.        Current Outpatient Medications:   •  betamethasone dipropionate (DIPROLENE) 0.05 % cream, Apply 1 gm sparingly to affected area(s) twice daily, Disp: , Rfl:   •  clotrimazole-betamethasone (LOTRISONE) 1-0.05 % cream, Apply  topically to the appropriate area as directed Every 12 (Twelve) Hours. Apply sparingly to the affected area(s) twice daily, Disp: 45 g, Rfl: 0  •  cyclobenzaprine (FLEXERIL) 5 MG tablet, Take 1 tablet by mouth 2 (Two) Times a Day As Needed for Muscle Spasms., Disp: 60 tablet, Rfl: 2  •  famotidine (PEPCID) 20 MG tablet, Take 1 tablet by mouth daily. X 2 weeks and then as needed for stomach acid, Disp: 30 tablet, Rfl: 5  •  fluticasone (FLONASE) 50 MCG/ACT nasal spray, 2 sprays into the nostril(s) as directed by provider Daily. Prn allergies, Disp: 16 g, Rfl: 1  •  ibuprofen (IBU) 800 MG tablet, Take 1 tablet by mouth Every 6 (Six) Hours As Needed for Mild Pain ., Disp: 90 tablet, Rfl: 2  •  traMADol (ULTRAM) 50 MG tablet, Take 1 tab q6hr prn endometriosis pain, Disp: 90 tablet, Rfl: 2    Objective     /70   Temp 97 °F (36.1 °C)   Ht 170.2 cm (67\")   Wt 120 kg (264 lb 6.4 oz)   BMI 41.41 kg/m²     Physical Exam   Constitutional: She is oriented to person, place, and time. She appears well-developed and well-nourished.   HENT:   Right Ear: Tympanic membrane and ear canal normal.   Left Ear: Tympanic membrane and ear canal normal.   Mouth/Throat: Oropharynx is clear and moist.   Eyes: Conjunctivae and EOM are normal. Pupils are equal, round, and reactive to light.   Neck: No thyromegaly present.   Cardiovascular: Normal rate and regular rhythm.   Pulmonary/Chest: Effort normal and breath sounds normal.   Neurological: She is alert and oriented to person, place, and time.   Skin: Skin is warm and dry.   Psychiatric: She has a normal " mood and affect.   Vitals reviewed.      Assessment/Plan   April was seen today for follow-up.    Diagnoses and all orders for this visit:    Endometriosis  -     traMADol (ULTRAM) 50 MG tablet; Take 1 tab q6hr prn endometriosis pain  -     ibuprofen (IBU) 800 MG tablet; Take 1 tablet by mouth Every 6 (Six) Hours As Needed for Mild Pain .  -     cyclobenzaprine (FLEXERIL) 5 MG tablet; Take 1 tablet by mouth 2 (Two) Times a Day As Needed for Muscle Spasms.    Degeneration of intervertebral disc of lumbar region  -     traMADol (ULTRAM) 50 MG tablet; Take 1 tab q6hr prn endometriosis pain  -     ibuprofen (IBU) 800 MG tablet; Take 1 tablet by mouth Every 6 (Six) Hours As Needed for Mild Pain .  -     cyclobenzaprine (FLEXERIL) 5 MG tablet; Take 1 tablet by mouth 2 (Two) Times a Day As Needed for Muscle Spasms.    Acute superficial gastritis without hemorrhage    Chronic allergic rhinitis  -     POC Rapid Strep A  -     fluticasone (FLONASE) 50 MCG/ACT nasal spray; 2 sprays into the nostril(s) as directed by provider Daily. Prn allergies    Eczema, unspecified type  -     clotrimazole-betamethasone (LOTRISONE) 1-0.05 % cream; Apply  topically to the appropriate area as directed Every 12 (Twelve) Hours. Apply sparingly to the affected area(s) twice daily        1. GYN/ORTHO- back pain and endometriosis- stable. Will RF ibu, flexeril and tramadol x6mo today  2. GI- situational gastritis. Advised to restart the pepcid with ibu at the earliest sign of stomach acid issues.   3. RESP- allergies with eustachian tube dysfunction. Will check a strep test. Will treat with flonse. Advised to apply with a Q tip.   4. DERM- eczema with chronic athletes foot- will RF lotrisone. Advised to use OTC steroid cream  and lamisil spray as additional option. Advised to bleach her shower and socks and spray her shoes with lamisil.  5. RECHECK- 6mo routine

## 2019-04-26 NOTE — PATIENT INSTRUCTIONS
1. GYN/ORTHO- back pain and endometriosis- stable. Will RF ibu, flexeril and tramadol x6mo today  2. GI- situational gastritis. Advised to restart the pepcid with ibu at the earliest sign of stomach acid issues.   3. RESP- allergies with eustachian tube dysfunction. Will check a strep test. Will treat with flonse. Advised to apply with a Q tip.   4. DERM- eczema with chronic athletes foot- will RF lotrisone. Advised to use OTC steroid cream  and lamisil spray as additional option. Advised to bleach her shower and socks and spray her shoes with lamisil.  5. RECHECK- 6mo routine

## 2019-08-16 DIAGNOSIS — M51.36 DEGENERATION OF INTERVERTEBRAL DISC OF LUMBAR REGION: ICD-10-CM

## 2019-08-16 DIAGNOSIS — N80.9 ENDOMETRIOSIS: ICD-10-CM

## 2019-08-16 RX ORDER — TRAMADOL HYDROCHLORIDE 50 MG/1
TABLET ORAL
Qty: 90 TABLET | Refills: 2 | OUTPATIENT
Start: 2019-08-16

## 2019-08-23 ENCOUNTER — OFFICE VISIT (OUTPATIENT)
Dept: INTERNAL MEDICINE | Facility: CLINIC | Age: 39
End: 2019-08-23

## 2019-08-23 VITALS
DIASTOLIC BLOOD PRESSURE: 92 MMHG | BODY MASS INDEX: 39.77 KG/M2 | WEIGHT: 253.38 LBS | RESPIRATION RATE: 18 BRPM | HEIGHT: 67 IN | TEMPERATURE: 97.1 F | OXYGEN SATURATION: 100 % | HEART RATE: 84 BPM | SYSTOLIC BLOOD PRESSURE: 138 MMHG

## 2019-08-23 DIAGNOSIS — J01.90 ACUTE NON-RECURRENT SINUSITIS, UNSPECIFIED LOCATION: Primary | ICD-10-CM

## 2019-08-23 DIAGNOSIS — N80.9 ENDOMETRIOSIS: ICD-10-CM

## 2019-08-23 DIAGNOSIS — R05.9 COUGH: ICD-10-CM

## 2019-08-23 DIAGNOSIS — M51.36 DEGENERATION OF INTERVERTEBRAL DISC OF LUMBAR REGION: ICD-10-CM

## 2019-08-23 DIAGNOSIS — J30.9 CHRONIC ALLERGIC RHINITIS: ICD-10-CM

## 2019-08-23 PROCEDURE — 99214 OFFICE O/P EST MOD 30 MIN: CPT | Performed by: NURSE PRACTITIONER

## 2019-08-23 RX ORDER — CYCLOBENZAPRINE HCL 5 MG
5 TABLET ORAL 2 TIMES DAILY PRN
Qty: 60 TABLET | Refills: 5 | Status: SHIPPED | OUTPATIENT
Start: 2019-08-23 | End: 2020-01-24 | Stop reason: SDUPTHER

## 2019-08-23 RX ORDER — IBUPROFEN 800 MG/1
800 TABLET ORAL EVERY 6 HOURS PRN
Qty: 90 TABLET | Refills: 5 | Status: SHIPPED | OUTPATIENT
Start: 2019-08-23 | End: 2020-01-30 | Stop reason: SDUPTHER

## 2019-08-23 RX ORDER — AZITHROMYCIN 250 MG/1
TABLET, FILM COATED ORAL
Qty: 6 TABLET | Refills: 0 | Status: SHIPPED | OUTPATIENT
Start: 2019-08-23 | End: 2020-01-30

## 2019-08-23 RX ORDER — FLUTICASONE PROPIONATE 50 MCG
2 SPRAY, SUSPENSION (ML) NASAL DAILY
Qty: 16 G | Refills: 5 | Status: SHIPPED | OUTPATIENT
Start: 2019-08-23 | End: 2020-01-30 | Stop reason: SDUPTHER

## 2019-08-23 RX ORDER — PROMETHAZINE HYDROCHLORIDE AND CODEINE PHOSPHATE 6.25; 1 MG/5ML; MG/5ML
5 SOLUTION ORAL EVERY 4 HOURS PRN
Qty: 240 ML | Refills: 0 | Status: SHIPPED | OUTPATIENT
Start: 2019-08-23 | End: 2020-01-30

## 2019-08-23 RX ORDER — TRAMADOL HYDROCHLORIDE 50 MG/1
TABLET ORAL
Qty: 90 TABLET | Refills: 1 | Status: SHIPPED | OUTPATIENT
Start: 2019-08-23 | End: 2019-10-17 | Stop reason: SDUPTHER

## 2019-08-23 NOTE — PROGRESS NOTES
Subjective   Chief Complaint   Patient presents with   • Med Refill   • Cough     x3 days      April Cameron is a 39 y.o. female here today for follow up and acute illness. She has 3 day history of upper lower airway congestion, sore throat, headache, facial pain, ear fullness, cough, fatigue, and body aches. Cough is becoming productive with thick sputum. She has history of chronic rhinitis and take Flonase regularly.  This has helped some with nasal congestion.  First blood pressure reading today appears to be in error but second pressure reading is still slightly elevated.  Patient is taking a large amount of over-the-counter cold medications and Afrin nose spray.  She will discontinue use of all these products.  She is prescribed a cough medicine today with DM she will use this minimally.  She will monitor her blood pressures at home once daily and schedule follow-up if blood pressures are consistently 140/90 or greater.  She has history of endometriosis and degenerative disc disease of lumbar spine.  She frequently has muscle spasms in her lower back and pelvic areas.  Ultram and Flexeril greatly help with her discomfort.  She denies any shortness of air or chest pain today.     The following portions of the patient's history were reviewed and updated as appropriate: allergies, current medications, past family history, past medical history, past social history, past surgical history and problem list.    Review of Systems   Constitutional: Positive for activity change and fatigue. Negative for appetite change, chills, diaphoresis, fever, unexpected weight gain and unexpected weight loss.   HENT: Positive for congestion, postnasal drip, rhinorrhea, sinus pressure and sore throat. Negative for ear discharge, ear pain, mouth sores, nosebleeds and sneezing.    Eyes: Negative for pain, discharge and itching.   Respiratory: Positive for cough and chest tightness. Negative for shortness of breath and wheezing.   "  Cardiovascular: Negative for chest pain, palpitations and leg swelling.   Gastrointestinal: Negative for abdominal pain, constipation, diarrhea, nausea and vomiting.   Endocrine: Negative for heat intolerance, polydipsia and polyphagia.   Genitourinary: Positive for menstrual problem and pelvic pain. Negative for dysuria, flank pain, frequency, hematuria and urgency.   Musculoskeletal: Positive for arthralgias, back pain and myalgias. Negative for gait problem, joint swelling, neck pain and neck stiffness.   Skin: Negative for color change, pallor and rash.   Allergic/Immunologic: Positive for environmental allergies. Negative for immunocompromised state.   Neurological: Negative for seizures, speech difficulty, weakness and numbness.   Hematological: Negative for adenopathy.   Psychiatric/Behavioral: Negative for agitation, decreased concentration, sleep disturbance and depressed mood. The patient is not nervous/anxious.        Current Outpatient Medications on File Prior to Visit   Medication Sig   • betamethasone dipropionate (DIPROLENE) 0.05 % cream Apply 1 gm sparingly to affected area(s) twice daily   • clotrimazole-betamethasone (LOTRISONE) 1-0.05 % cream Apply  topically to the appropriate area as directed Every 12 (Twelve) Hours. Apply sparingly to the affected area(s) twice daily   • famotidine (PEPCID) 20 MG tablet Take 1 tablet by mouth daily. X 2 weeks and then as needed for stomach acid     No current facility-administered medications on file prior to visit.        Objective   Vitals:    08/23/19 1527 08/23/19 1606   BP: (!) 170/110 138/92   BP Location: Left arm    Patient Position: Sitting    Cuff Size: Adult    Pulse: 84    Resp: 18    Temp: 97.1 °F (36.2 °C)    TempSrc: Temporal    SpO2: 100%    Weight: 115 kg (253 lb 6 oz)    Height: 170.2 cm (67\")    PainSc: 0-No pain      Body mass index is 39.68 kg/m².    Physical Exam   Constitutional: She is oriented to person, place, and time. She appears " well-developed and well-nourished.   HENT:   Head: Normocephalic and atraumatic.   Nose: Mucosal edema and rhinorrhea present. Right sinus exhibits maxillary sinus tenderness and frontal sinus tenderness. Left sinus exhibits maxillary sinus tenderness and frontal sinus tenderness.   Mouth/Throat: Uvula is midline and mucous membranes are normal. Posterior oropharyngeal erythema present.   Eyes: EOM are normal. Pupils are equal, round, and reactive to light.   Neck: Trachea normal and normal range of motion. No thyromegaly present.   Cardiovascular: Normal rate, regular rhythm and normal heart sounds.   Pulmonary/Chest: Effort normal and breath sounds normal.   Abdominal: Soft. Bowel sounds are normal. There is no tenderness.   Musculoskeletal: Normal range of motion.        Lumbar back: She exhibits spasm.   Neurological: She is alert and oriented to person, place, and time. She has normal strength. GCS eye subscore is 4. GCS verbal subscore is 5. GCS motor subscore is 6.   Skin: Skin is warm and dry.   Psychiatric: She has a normal mood and affect. Her speech is normal and behavior is normal. Thought content normal. Cognition and memory are normal.   Vitals reviewed.      Assessment/Plan   April was seen today for med refill and cough.    Diagnoses and all orders for this visit:    Acute non-recurrent sinusitis, unspecified location -new condition requiring medication management  -     azithromycin (ZITHROMAX) 250 MG tablet; Take 2 tablets the first day, then 1 tablet daily for 4 days.    Chronic allergic rhinitis -chronic, unstable requiring medication management  -     fluticasone (FLONASE) 50 MCG/ACT nasal spray; 2 sprays into the nostril(s) as directed by provider Daily. Prn allergies    Endometriosis -chronic, stable requiring medication management  -     cyclobenzaprine (FLEXERIL) 5 MG tablet; Take 1 tablet by mouth 2 (Two) Times a Day As Needed for Muscle Spasms.  -     ibuprofen (IBU) 800 MG tablet; Take 1  tablet by mouth Every 6 (Six) Hours As Needed for Mild Pain .  -     traMADol (ULTRAM) 50 MG tablet; Take 1 tab q6hr prn endometriosis pain    Degeneration of intervertebral disc of lumbar region -chronic, stable requiring medication management  -     cyclobenzaprine (FLEXERIL) 5 MG tablet; Take 1 tablet by mouth 2 (Two) Times a Day As Needed for Muscle Spasms.  -     ibuprofen (IBU) 800 MG tablet; Take 1 tablet by mouth Every 6 (Six) Hours As Needed for Mild Pain .  -     traMADol (ULTRAM) 50 MG tablet; Take 1 tab q6hr prn endometriosis pain    Cough -new requiring medication management  -     promethazine-codeine (PHENERGAN with CODEINE) 6.25-10 MG/5ML solution; Take 5 mL by mouth Every 4 (Four) Hours As Needed for Cough.   Use coolmist humidifier at bedtime to improve nighttime cough.           Current Outpatient Medications:   •  betamethasone dipropionate (DIPROLENE) 0.05 % cream, Apply 1 gm sparingly to affected area(s) twice daily, Disp: , Rfl:   •  clotrimazole-betamethasone (LOTRISONE) 1-0.05 % cream, Apply  topically to the appropriate area as directed Every 12 (Twelve) Hours. Apply sparingly to the affected area(s) twice daily, Disp: 45 g, Rfl: 0  •  cyclobenzaprine (FLEXERIL) 5 MG tablet, Take 1 tablet by mouth 2 (Two) Times a Day As Needed for Muscle Spasms., Disp: 60 tablet, Rfl: 5  •  famotidine (PEPCID) 20 MG tablet, Take 1 tablet by mouth daily. X 2 weeks and then as needed for stomach acid, Disp: 30 tablet, Rfl: 5  •  fluticasone (FLONASE) 50 MCG/ACT nasal spray, 2 sprays into the nostril(s) as directed by provider Daily. Prn allergies, Disp: 16 g, Rfl: 5  •  ibuprofen (IBU) 800 MG tablet, Take 1 tablet by mouth Every 6 (Six) Hours As Needed for Mild Pain ., Disp: 90 tablet, Rfl: 5  •  traMADol (ULTRAM) 50 MG tablet, Take 1 tab q6hr prn endometriosis pain, Disp: 90 tablet, Rfl: 1  •  azithromycin (ZITHROMAX) 250 MG tablet, Take 2 tablets the first day, then 1 tablet daily for 4 days., Disp: 6  tablet, Rfl: 0  •  promethazine-codeine (PHENERGAN with CODEINE) 6.25-10 MG/5ML solution, Take 5 mL by mouth Every 4 (Four) Hours As Needed for Cough., Disp: 240 mL, Rfl: 0       Plan of care reviewed with the patient at the conclusion of today's visit.  Education was provided regarding diagnosis, management, and any prescribed or recommended OTC medications.  Patient verbalized understanding of and agreement with management plan.     Return in about 6 months (around 2/23/2020), or if symptoms worsen or fail to improve.      Alexa Trujillo, APRN

## 2019-10-17 DIAGNOSIS — N80.9 ENDOMETRIOSIS: ICD-10-CM

## 2019-10-17 DIAGNOSIS — L30.9 ECZEMA, UNSPECIFIED TYPE: ICD-10-CM

## 2019-10-17 DIAGNOSIS — M51.36 DEGENERATION OF INTERVERTEBRAL DISC OF LUMBAR REGION: ICD-10-CM

## 2019-10-17 RX ORDER — CLOTRIMAZOLE AND BETAMETHASONE DIPROPIONATE 10; .64 MG/G; MG/G
CREAM TOPICAL EVERY 12 HOURS SCHEDULED
Qty: 45 G | Refills: 0 | Status: SHIPPED | OUTPATIENT
Start: 2019-10-17 | End: 2020-01-30 | Stop reason: SDUPTHER

## 2019-10-17 RX ORDER — TRAMADOL HYDROCHLORIDE 50 MG/1
TABLET ORAL
Qty: 90 TABLET | Refills: 0 | Status: SHIPPED | OUTPATIENT
Start: 2019-10-17 | End: 2019-11-22 | Stop reason: SDUPTHER

## 2019-11-22 DIAGNOSIS — M51.36 DEGENERATION OF INTERVERTEBRAL DISC OF LUMBAR REGION: ICD-10-CM

## 2019-11-22 DIAGNOSIS — N80.9 ENDOMETRIOSIS: ICD-10-CM

## 2019-11-22 RX ORDER — TRAMADOL HYDROCHLORIDE 50 MG/1
TABLET ORAL
Qty: 90 TABLET | Refills: 0 | Status: SHIPPED | OUTPATIENT
Start: 2019-11-22 | End: 2019-12-17 | Stop reason: SDUPTHER

## 2019-12-17 DIAGNOSIS — M51.36 DEGENERATION OF INTERVERTEBRAL DISC OF LUMBAR REGION: ICD-10-CM

## 2019-12-17 DIAGNOSIS — N80.9 ENDOMETRIOSIS: ICD-10-CM

## 2019-12-18 ENCOUNTER — TELEPHONE (OUTPATIENT)
Dept: INTERNAL MEDICINE | Facility: CLINIC | Age: 39
End: 2019-12-18

## 2019-12-18 RX ORDER — TRAMADOL HYDROCHLORIDE 50 MG/1
TABLET ORAL
Qty: 90 TABLET | Refills: 0 | Status: SHIPPED | OUTPATIENT
Start: 2019-12-18 | End: 2020-01-24 | Stop reason: SDUPTHER

## 2019-12-18 NOTE — TELEPHONE ENCOUNTER
Spoke with patient about medication called into pharmacy. Patient voiced understanding and stated she lost her insurance coverage and will make an appt in January.

## 2019-12-18 NOTE — TELEPHONE ENCOUNTER
Contact patient and let her know that I just filled her tramadol but new policy is that any patient on controlled substances must have drug screen and be seen every 3 months. I last saw her in August so ask her to schedule with when she can.

## 2020-01-24 ENCOUNTER — TELEPHONE (OUTPATIENT)
Dept: INTERNAL MEDICINE | Facility: CLINIC | Age: 40
End: 2020-01-24

## 2020-01-24 DIAGNOSIS — N80.9 ENDOMETRIOSIS: ICD-10-CM

## 2020-01-24 DIAGNOSIS — M51.36 DEGENERATION OF INTERVERTEBRAL DISC OF LUMBAR REGION: ICD-10-CM

## 2020-01-24 DIAGNOSIS — G47.419 NARCOLEPSY WITHOUT CATAPLEXY: Primary | ICD-10-CM

## 2020-01-24 RX ORDER — TRAMADOL HYDROCHLORIDE 50 MG/1
50 TABLET ORAL 3 TIMES DAILY PRN
Qty: 21 TABLET | Refills: 0 | Status: SHIPPED | OUTPATIENT
Start: 2020-01-24 | End: 2020-01-30 | Stop reason: SDUPTHER

## 2020-01-24 RX ORDER — DEXTROAMPHETAMINE SACCHARATE, AMPHETAMINE ASPARTATE MONOHYDRATE, DEXTROAMPHETAMINE SULFATE AND AMPHETAMINE SULFATE 2.5; 2.5; 2.5; 2.5 MG/1; MG/1; MG/1; MG/1
10 CAPSULE, EXTENDED RELEASE ORAL EVERY MORNING
Qty: 30 CAPSULE | Refills: 0 | Status: SHIPPED | OUTPATIENT
Start: 2020-01-24 | End: 2020-01-30

## 2020-01-24 RX ORDER — CYCLOBENZAPRINE HCL 5 MG
5 TABLET ORAL 2 TIMES DAILY PRN
Qty: 60 TABLET | Refills: 0 | Status: SHIPPED | OUTPATIENT
Start: 2020-01-24 | End: 2020-01-30 | Stop reason: SDUPTHER

## 2020-01-24 NOTE — TELEPHONE ENCOUNTER
I sent it for her but only gave her enough tramadol till her appt in one week because i've not seen her since august. Thanks.

## 2020-01-30 ENCOUNTER — OFFICE VISIT (OUTPATIENT)
Dept: INTERNAL MEDICINE | Facility: CLINIC | Age: 40
End: 2020-01-30

## 2020-01-30 VITALS
HEIGHT: 67 IN | SYSTOLIC BLOOD PRESSURE: 130 MMHG | DIASTOLIC BLOOD PRESSURE: 88 MMHG | HEART RATE: 94 BPM | WEIGHT: 237.38 LBS | OXYGEN SATURATION: 99 % | BODY MASS INDEX: 37.26 KG/M2 | RESPIRATION RATE: 20 BRPM | TEMPERATURE: 97.5 F

## 2020-01-30 DIAGNOSIS — Z13.220 LIPID SCREENING: ICD-10-CM

## 2020-01-30 DIAGNOSIS — M51.36 DEGENERATION OF INTERVERTEBRAL DISC OF LUMBAR REGION: ICD-10-CM

## 2020-01-30 DIAGNOSIS — Z13.29 THYROID DISORDER SCREENING: ICD-10-CM

## 2020-01-30 DIAGNOSIS — Z13.21 ENCOUNTER FOR VITAMIN DEFICIENCY SCREENING: ICD-10-CM

## 2020-01-30 DIAGNOSIS — B35.3 TINEA PEDIS OF RIGHT FOOT: Primary | ICD-10-CM

## 2020-01-30 DIAGNOSIS — N80.9 ENDOMETRIOSIS: ICD-10-CM

## 2020-01-30 DIAGNOSIS — Z13.0 SCREENING FOR BLOOD DISEASE: ICD-10-CM

## 2020-01-30 DIAGNOSIS — B35.1 ONYCHOMYCOSIS: ICD-10-CM

## 2020-01-30 DIAGNOSIS — J30.89 ENVIRONMENTAL AND SEASONAL ALLERGIES: ICD-10-CM

## 2020-01-30 LAB
25(OH)D3 SERPL-MCNC: 15.2 NG/ML (ref 30–100)
ALBUMIN SERPL-MCNC: 4.7 G/DL (ref 3.5–5.2)
ALBUMIN/GLOB SERPL: 1.8 G/DL
ALP SERPL-CCNC: 79 U/L (ref 39–117)
ALT SERPL W P-5'-P-CCNC: 21 U/L (ref 1–33)
ANION GAP SERPL CALCULATED.3IONS-SCNC: 15.4 MMOL/L (ref 5–15)
AST SERPL-CCNC: 20 U/L (ref 1–32)
BASOPHILS # BLD AUTO: 0.03 10*3/MM3 (ref 0–0.2)
BASOPHILS NFR BLD AUTO: 0.5 % (ref 0–1.5)
BILIRUB SERPL-MCNC: 0.5 MG/DL (ref 0.2–1.2)
BUN BLD-MCNC: 9 MG/DL (ref 6–20)
BUN/CREAT SERPL: 14.5 (ref 7–25)
CALCIUM SPEC-SCNC: 9.6 MG/DL (ref 8.6–10.5)
CHLORIDE SERPL-SCNC: 102 MMOL/L (ref 98–107)
CHOLEST SERPL-MCNC: 184 MG/DL (ref 0–200)
CO2 SERPL-SCNC: 22.6 MMOL/L (ref 22–29)
CREAT BLD-MCNC: 0.62 MG/DL (ref 0.57–1)
DEPRECATED RDW RBC AUTO: 43.5 FL (ref 37–54)
EOSINOPHIL # BLD AUTO: 0.08 10*3/MM3 (ref 0–0.4)
EOSINOPHIL NFR BLD AUTO: 1.4 % (ref 0.3–6.2)
ERYTHROCYTE [DISTWIDTH] IN BLOOD BY AUTOMATED COUNT: 13.3 % (ref 12.3–15.4)
FOLATE SERPL-MCNC: 15.2 NG/ML (ref 4.78–24.2)
GFR SERPL CREATININE-BSD FRML MDRD: 106 ML/MIN/1.73
GLOBULIN UR ELPH-MCNC: 2.6 GM/DL
GLUCOSE BLD-MCNC: 90 MG/DL (ref 65–99)
HCT VFR BLD AUTO: 39.2 % (ref 34–46.6)
HDLC SERPL-MCNC: 56 MG/DL (ref 40–60)
HGB BLD-MCNC: 13.3 G/DL (ref 12–15.9)
IMM GRANULOCYTES # BLD AUTO: 0.02 10*3/MM3 (ref 0–0.05)
IMM GRANULOCYTES NFR BLD AUTO: 0.4 % (ref 0–0.5)
LDLC SERPL CALC-MCNC: 106 MG/DL (ref 0–100)
LDLC/HDLC SERPL: 1.9 {RATIO}
LYMPHOCYTES # BLD AUTO: 1.79 10*3/MM3 (ref 0.7–3.1)
LYMPHOCYTES NFR BLD AUTO: 31.8 % (ref 19.6–45.3)
MCH RBC QN AUTO: 30.2 PG (ref 26.6–33)
MCHC RBC AUTO-ENTMCNC: 33.9 G/DL (ref 31.5–35.7)
MCV RBC AUTO: 88.9 FL (ref 79–97)
MONOCYTES # BLD AUTO: 0.5 10*3/MM3 (ref 0.1–0.9)
MONOCYTES NFR BLD AUTO: 8.9 % (ref 5–12)
NEUTROPHILS # BLD AUTO: 3.21 10*3/MM3 (ref 1.7–7)
NEUTROPHILS NFR BLD AUTO: 57 % (ref 42.7–76)
NRBC BLD AUTO-RTO: 0 /100 WBC (ref 0–0.2)
PLATELET # BLD AUTO: 242 10*3/MM3 (ref 140–450)
PMV BLD AUTO: 12.8 FL (ref 6–12)
POTASSIUM BLD-SCNC: 4.2 MMOL/L (ref 3.5–5.2)
PROT SERPL-MCNC: 7.3 G/DL (ref 6–8.5)
RBC # BLD AUTO: 4.41 10*6/MM3 (ref 3.77–5.28)
SODIUM BLD-SCNC: 140 MMOL/L (ref 136–145)
TRIGL SERPL-MCNC: 109 MG/DL (ref 0–150)
TSH SERPL DL<=0.05 MIU/L-ACNC: 2.09 UIU/ML (ref 0.27–4.2)
VIT B12 BLD-MCNC: 352 PG/ML (ref 211–946)
VLDLC SERPL-MCNC: 21.8 MG/DL (ref 5–40)
WBC NRBC COR # BLD: 5.63 10*3/MM3 (ref 3.4–10.8)

## 2020-01-30 PROCEDURE — 80050 GENERAL HEALTH PANEL: CPT | Performed by: NURSE PRACTITIONER

## 2020-01-30 PROCEDURE — 82607 VITAMIN B-12: CPT | Performed by: NURSE PRACTITIONER

## 2020-01-30 PROCEDURE — 82746 ASSAY OF FOLIC ACID SERUM: CPT | Performed by: NURSE PRACTITIONER

## 2020-01-30 PROCEDURE — 99214 OFFICE O/P EST MOD 30 MIN: CPT | Performed by: NURSE PRACTITIONER

## 2020-01-30 PROCEDURE — 80061 LIPID PANEL: CPT | Performed by: NURSE PRACTITIONER

## 2020-01-30 PROCEDURE — 82306 VITAMIN D 25 HYDROXY: CPT | Performed by: NURSE PRACTITIONER

## 2020-01-30 RX ORDER — CYCLOBENZAPRINE HCL 5 MG
5 TABLET ORAL 2 TIMES DAILY PRN
Qty: 60 TABLET | Refills: 2 | Status: SHIPPED | OUTPATIENT
Start: 2020-01-30 | End: 2020-10-19 | Stop reason: SDUPTHER

## 2020-01-30 RX ORDER — TRAMADOL HYDROCHLORIDE 50 MG/1
50 TABLET ORAL 3 TIMES DAILY PRN
Qty: 90 TABLET | Refills: 0 | Status: SHIPPED | OUTPATIENT
Start: 2020-01-30 | End: 2020-02-24 | Stop reason: SDUPTHER

## 2020-01-30 RX ORDER — IBUPROFEN 800 MG/1
800 TABLET ORAL EVERY 6 HOURS PRN
Qty: 90 TABLET | Refills: 5 | Status: SHIPPED | OUTPATIENT
Start: 2020-01-30 | End: 2020-09-11 | Stop reason: SDUPTHER

## 2020-01-30 RX ORDER — FLUTICASONE PROPIONATE 50 MCG
2 SPRAY, SUSPENSION (ML) NASAL DAILY
Qty: 16 G | Refills: 5 | Status: SHIPPED | OUTPATIENT
Start: 2020-01-30 | End: 2021-03-22

## 2020-01-30 RX ORDER — CLOTRIMAZOLE AND BETAMETHASONE DIPROPIONATE 10; .64 MG/G; MG/G
CREAM TOPICAL EVERY 12 HOURS SCHEDULED
Qty: 45 G | Refills: 5 | Status: SHIPPED | OUTPATIENT
Start: 2020-01-30 | End: 2021-03-22

## 2020-01-30 RX ORDER — TERBINAFINE HYDROCHLORIDE 250 MG/1
250 TABLET ORAL DAILY
Qty: 30 TABLET | Refills: 2 | Status: SHIPPED | OUTPATIENT
Start: 2020-01-30 | End: 2020-07-24 | Stop reason: SDUPTHER

## 2020-02-12 RX ORDER — ERGOCALCIFEROL 1.25 MG/1
50000 CAPSULE ORAL WEEKLY
Qty: 4 CAPSULE | Refills: 2 | Status: SHIPPED | OUTPATIENT
Start: 2020-02-12 | End: 2021-03-22

## 2020-02-12 NOTE — PROGRESS NOTES
Please contact patient and advise your vitamin D is low and I have called in a once a week supplement to the pharmacy to start.  All other labs are normal.

## 2020-02-24 DIAGNOSIS — M51.36 DEGENERATION OF INTERVERTEBRAL DISC OF LUMBAR REGION: ICD-10-CM

## 2020-02-24 DIAGNOSIS — N80.9 ENDOMETRIOSIS: ICD-10-CM

## 2020-02-24 RX ORDER — TRAMADOL HYDROCHLORIDE 50 MG/1
50 TABLET ORAL 3 TIMES DAILY PRN
Qty: 90 TABLET | Refills: 0 | Status: SHIPPED | OUTPATIENT
Start: 2020-02-24 | End: 2020-03-25 | Stop reason: SDUPTHER

## 2020-03-25 DIAGNOSIS — N80.9 ENDOMETRIOSIS: ICD-10-CM

## 2020-03-25 DIAGNOSIS — M51.36 DEGENERATION OF INTERVERTEBRAL DISC OF LUMBAR REGION: ICD-10-CM

## 2020-03-25 RX ORDER — TRAMADOL HYDROCHLORIDE 50 MG/1
50 TABLET ORAL 3 TIMES DAILY PRN
Qty: 90 TABLET | Refills: 0 | Status: SHIPPED | OUTPATIENT
Start: 2020-03-25 | End: 2020-04-22 | Stop reason: SDUPTHER

## 2020-04-22 ENCOUNTER — TELEPHONE (OUTPATIENT)
Dept: INTERNAL MEDICINE | Facility: CLINIC | Age: 40
End: 2020-04-22

## 2020-04-22 DIAGNOSIS — N80.9 ENDOMETRIOSIS: ICD-10-CM

## 2020-04-22 DIAGNOSIS — M51.36 DEGENERATION OF INTERVERTEBRAL DISC OF LUMBAR REGION: ICD-10-CM

## 2020-04-22 RX ORDER — TRAMADOL HYDROCHLORIDE 50 MG/1
50 TABLET ORAL 3 TIMES DAILY PRN
Qty: 90 TABLET | Refills: 0 | Status: SHIPPED | OUTPATIENT
Start: 2020-04-22 | End: 2020-05-19

## 2020-04-22 NOTE — TELEPHONE ENCOUNTER
Let her know that due to the COVID pandemic I do not need her to come for this and will call in her script. She can follow up with me in office after COVID situation improves. Thanks.

## 2020-04-22 NOTE — TELEPHONE ENCOUNTER
PT CHANGED HER VISIT TO A TELEPHONE VISIT FOR 4/24 SHE WAS HOWEVER CONCERNED BECAUSE SHE USUALLY HAS TO DO A TEST FOR traMADol (ULTRAM) 50 MG tablet  IN ORDER TO GET IT FILLED AND WANTED TO KNOW IF SHE WOULD STILL HAVE TO COMPLETE THIS TO GET IT REFILLED.  CAN SOMEONE PLEASE ADVISE AND CONTACT THE PT IF ARRANGEMENTS NEED TO BE MADE.

## 2020-04-24 ENCOUNTER — OFFICE VISIT (OUTPATIENT)
Dept: INTERNAL MEDICINE | Facility: CLINIC | Age: 40
End: 2020-04-24

## 2020-04-24 DIAGNOSIS — M54.50 LUMBAR PAIN: Primary | ICD-10-CM

## 2020-04-24 DIAGNOSIS — N80.9 ENDOMETRIOSIS: ICD-10-CM

## 2020-04-24 DIAGNOSIS — J30.89 ENVIRONMENTAL AND SEASONAL ALLERGIES: ICD-10-CM

## 2020-04-24 DIAGNOSIS — B35.1 ONYCHOMYCOSIS: ICD-10-CM

## 2020-04-24 PROCEDURE — 99214 OFFICE O/P EST MOD 30 MIN: CPT | Performed by: NURSE PRACTITIONER

## 2020-05-19 DIAGNOSIS — N80.9 ENDOMETRIOSIS: ICD-10-CM

## 2020-05-19 DIAGNOSIS — M51.36 DEGENERATION OF INTERVERTEBRAL DISC OF LUMBAR REGION: ICD-10-CM

## 2020-05-19 RX ORDER — TRAMADOL HYDROCHLORIDE 50 MG/1
TABLET ORAL
Qty: 90 TABLET | Refills: 0 | Status: SHIPPED | OUTPATIENT
Start: 2020-05-19 | End: 2020-06-25

## 2020-06-24 DIAGNOSIS — M51.36 DEGENERATION OF INTERVERTEBRAL DISC OF LUMBAR REGION: ICD-10-CM

## 2020-06-24 DIAGNOSIS — N80.9 ENDOMETRIOSIS: ICD-10-CM

## 2020-06-25 RX ORDER — TRAMADOL HYDROCHLORIDE 50 MG/1
TABLET ORAL
Qty: 90 TABLET | Refills: 0 | Status: SHIPPED | OUTPATIENT
Start: 2020-06-25 | End: 2020-07-22 | Stop reason: SDUPTHER

## 2020-07-22 DIAGNOSIS — N80.9 ENDOMETRIOSIS: ICD-10-CM

## 2020-07-22 DIAGNOSIS — M51.36 DEGENERATION OF INTERVERTEBRAL DISC OF LUMBAR REGION: ICD-10-CM

## 2020-07-22 NOTE — TELEPHONE ENCOUNTER
REFILL REQUEST:     Disp Refills Start End    traMADol (ULTRAM) 50 MG tablet 90 tablet 0 6/25/2020     Sig: TAKE ONE TABLET BY MOUTH THREE TIMES A DAY AS NEEDED FOR MODERATE PAIN            Associated Diagnoses     Endometriosis       Degeneration of intervertebral disc of lumbar region       Pharmacy     [facility] [address] - [address] [place] [address] [address] & [address] [facility] [phone] [facility] [phone] FX

## 2020-07-23 RX ORDER — TRAMADOL HYDROCHLORIDE 50 MG/1
50 TABLET ORAL 3 TIMES DAILY PRN
Qty: 90 TABLET | Refills: 0 | Status: SHIPPED | OUTPATIENT
Start: 2020-07-23 | End: 2020-08-17

## 2020-07-24 ENCOUNTER — TELEPHONE (OUTPATIENT)
Dept: INTERNAL MEDICINE | Facility: CLINIC | Age: 40
End: 2020-07-24

## 2020-07-24 ENCOUNTER — OFFICE VISIT (OUTPATIENT)
Dept: INTERNAL MEDICINE | Facility: CLINIC | Age: 40
End: 2020-07-24

## 2020-07-24 VITALS
WEIGHT: 250 LBS | RESPIRATION RATE: 16 BRPM | BODY MASS INDEX: 37.89 KG/M2 | HEIGHT: 68 IN | TEMPERATURE: 96.8 F | DIASTOLIC BLOOD PRESSURE: 88 MMHG | OXYGEN SATURATION: 99 % | HEART RATE: 81 BPM | SYSTOLIC BLOOD PRESSURE: 132 MMHG

## 2020-07-24 DIAGNOSIS — R10.2 PELVIC PAIN IN FEMALE: ICD-10-CM

## 2020-07-24 DIAGNOSIS — B35.1 ONYCHOMYCOSIS: Primary | ICD-10-CM

## 2020-07-24 PROCEDURE — 99214 OFFICE O/P EST MOD 30 MIN: CPT | Performed by: NURSE PRACTITIONER

## 2020-07-24 RX ORDER — TERBINAFINE HYDROCHLORIDE 250 MG/1
250 TABLET ORAL DAILY
Qty: 30 TABLET | Refills: 2 | Status: SHIPPED | OUTPATIENT
Start: 2020-07-24 | End: 2020-10-05 | Stop reason: SDUPTHER

## 2020-08-14 DIAGNOSIS — M51.36 DEGENERATION OF INTERVERTEBRAL DISC OF LUMBAR REGION: ICD-10-CM

## 2020-08-14 DIAGNOSIS — N80.9 ENDOMETRIOSIS: ICD-10-CM

## 2020-08-17 RX ORDER — TRAMADOL HYDROCHLORIDE 50 MG/1
TABLET ORAL
Qty: 90 TABLET | Refills: 0 | Status: SHIPPED | OUTPATIENT
Start: 2020-08-17 | End: 2020-09-14

## 2020-09-11 DIAGNOSIS — M51.36 DEGENERATION OF INTERVERTEBRAL DISC OF LUMBAR REGION: ICD-10-CM

## 2020-09-11 DIAGNOSIS — N80.9 ENDOMETRIOSIS: ICD-10-CM

## 2020-09-11 RX ORDER — IBUPROFEN 800 MG/1
800 TABLET ORAL EVERY 6 HOURS PRN
Qty: 90 TABLET | Refills: 5 | Status: SHIPPED | OUTPATIENT
Start: 2020-09-11 | End: 2021-04-07

## 2020-09-14 DIAGNOSIS — N80.9 ENDOMETRIOSIS: ICD-10-CM

## 2020-09-14 DIAGNOSIS — M51.36 DEGENERATION OF INTERVERTEBRAL DISC OF LUMBAR REGION: ICD-10-CM

## 2020-09-14 RX ORDER — TRAMADOL HYDROCHLORIDE 50 MG/1
TABLET ORAL
Qty: 90 TABLET | Refills: 0 | Status: SHIPPED | OUTPATIENT
Start: 2020-09-14 | End: 2020-10-15

## 2020-10-05 ENCOUNTER — OFFICE VISIT (OUTPATIENT)
Dept: INTERNAL MEDICINE | Facility: CLINIC | Age: 40
End: 2020-10-05

## 2020-10-05 VITALS
TEMPERATURE: 96.9 F | SYSTOLIC BLOOD PRESSURE: 142 MMHG | DIASTOLIC BLOOD PRESSURE: 80 MMHG | BODY MASS INDEX: 39.19 KG/M2 | WEIGHT: 258.6 LBS | HEIGHT: 68 IN

## 2020-10-05 DIAGNOSIS — N80.9 ENDOMETRIOSIS: Primary | ICD-10-CM

## 2020-10-05 DIAGNOSIS — B35.1 ONYCHOMYCOSIS: ICD-10-CM

## 2020-10-05 PROCEDURE — 99214 OFFICE O/P EST MOD 30 MIN: CPT | Performed by: NURSE PRACTITIONER

## 2020-10-05 RX ORDER — TERBINAFINE HYDROCHLORIDE 250 MG/1
250 TABLET ORAL DAILY
Qty: 30 TABLET | Refills: 2 | Status: SHIPPED | OUTPATIENT
Start: 2020-10-05 | End: 2021-01-04 | Stop reason: SDUPTHER

## 2020-10-05 NOTE — PROGRESS NOTES
Subjective   Chief Complaint   Patient presents with   • Follow-up     3 mo recheck      Alena Barnes is a 40 y.o. female here today for follow up on endometriosis and toenail fungus. Patient continues to experience pelvic pain related to endometriosis. She is seeing gynecology regularly for this. Tramadol greatly helps with discomfort and allows her to function in daily routine. She has toenail fungus of right large toe and has been taking lamisil. She has 3/4 healthy nail growth above nail bed. Discussed concerning issue with patient that drug screen showed small amount of klonopin that is not prescribed to her. Patient has occasional flare up of low back pain with sciatica and she has taken klonopin in the past to help with this and had old  prescription. Patient will no longer take this med since not currently prescribed.     I have reviewed the following portions of the patient's history and confirmed they are accurate: allergies, current medications, past family history, past medical history, past social history, past surgical history and problem list    I have personally completed the patient's review of systems.    Review of Systems   Constitutional: Positive for fatigue. Negative for activity change, appetite change, chills, diaphoresis, fever, unexpected weight gain and unexpected weight loss.   HENT: Negative for congestion, ear discharge, ear pain, mouth sores, nosebleeds, rhinorrhea, sinus pressure, sneezing and sore throat.    Eyes: Negative for pain, discharge and itching.   Respiratory: Negative for cough, chest tightness, shortness of breath and wheezing.    Cardiovascular: Negative for chest pain, palpitations and leg swelling.   Gastrointestinal: Positive for abdominal pain. Negative for constipation, diarrhea, nausea and vomiting.   Endocrine: Negative for heat intolerance, polydipsia and polyphagia.   Genitourinary: Positive for menstrual problem and pelvic pain. Negative for dysuria,  "flank pain, frequency, hematuria and urgency.   Musculoskeletal: Positive for arthralgias, back pain and myalgias. Negative for gait problem, joint swelling, neck pain and neck stiffness.   Skin: Negative for color change, dry skin, pallor and rash.        Thick yellowish toenail   Allergic/Immunologic: Positive for environmental allergies. Negative for immunocompromised state.   Neurological: Negative for seizures, speech difficulty, weakness and numbness.   Hematological: Negative for adenopathy.   Psychiatric/Behavioral: Negative for agitation, decreased concentration, sleep disturbance, suicidal ideas and depressed mood. The patient is not nervous/anxious.        Current Outpatient Medications on File Prior to Visit   Medication Sig   • clotrimazole-betamethasone (LOTRISONE) 1-0.05 % cream Apply  topically to the appropriate area as directed Every 12 (Twelve) Hours. Apply sparingly to the affected area(s) twice daily   • fluticasone (FLONASE) 50 MCG/ACT nasal spray 2 sprays into the nostril(s) as directed by provider Daily. Prn allergies   • ibuprofen (IBU) 800 MG tablet Take 1 tablet by mouth Every 6 (Six) Hours As Needed for Mild Pain .   • vitamin D (ERGOCALCIFEROL) 1.25 MG (50176 UT) capsule capsule Take 1 capsule by mouth 1 (One) Time Per Week.     No current facility-administered medications on file prior to visit.        Objective   Vitals:    10/05/20 1338   BP: 142/80   BP Location: Left arm   Patient Position: Sitting   Cuff Size: Large Adult   Temp: 96.9 °F (36.1 °C)   TempSrc: Temporal   Weight: 117 kg (258 lb 9.6 oz)   Height: 172.7 cm (68\")     Body mass index is 39.32 kg/m².    Physical Exam  Vitals signs reviewed.   Constitutional:       Appearance: Normal appearance. She is well-developed.   HENT:      Head: Normocephalic and atraumatic.      Nose: Nose normal.   Eyes:      General: Lids are normal.      Conjunctiva/sclera: Conjunctivae normal.      Pupils: Pupils are equal, round, and reactive " to light.   Neck:      Thyroid: No thyromegaly.      Trachea: Trachea normal.   Pulmonary:      Effort: Pulmonary effort is normal. No respiratory distress.   Skin:     General: Skin is warm and dry.      Comments: Large right toenail is thick and yellowish - improved since last appt.    Neurological:      Mental Status: She is alert and oriented to person, place, and time.      GCS: GCS eye subscore is 4. GCS verbal subscore is 5. GCS motor subscore is 6.   Psychiatric:         Attention and Perception: Attention normal.         Mood and Affect: Mood normal.         Speech: Speech normal.         Behavior: Behavior normal. Behavior is cooperative.         Assessment/Plan   Problem List Items Addressed This Visit        Musculoskeletal and Integument    Onychomycosis    Overview     Chronic issue unstable requiring medication management and monitoring. Discussed skin and nail hygiene. Continue lamisil.          Relevant Medications    terbinafine (lamiSIL) 250 MG tablet       Other    Endometriosis - Primary    Overview     Chronic issue unstable requiring medication management, monitoring, and follow up with speciality. Continue Tramadol PRN. Continue follow ups with gynecology.                   Current Outpatient Medications:   •  clotrimazole-betamethasone (LOTRISONE) 1-0.05 % cream, Apply  topically to the appropriate area as directed Every 12 (Twelve) Hours. Apply sparingly to the affected area(s) twice daily, Disp: 45 g, Rfl: 5  •  cyclobenzaprine (FLEXERIL) 5 MG tablet, Take 1 tablet by mouth 2 (Two) Times a Day As Needed for Muscle Spasms., Disp: 60 tablet, Rfl: 2  •  fluticasone (FLONASE) 50 MCG/ACT nasal spray, 2 sprays into the nostril(s) as directed by provider Daily. Prn allergies, Disp: 16 g, Rfl: 5  •  ibuprofen (IBU) 800 MG tablet, Take 1 tablet by mouth Every 6 (Six) Hours As Needed for Mild Pain ., Disp: 90 tablet, Rfl: 5  •  terbinafine (lamiSIL) 250 MG tablet, Take 1 tablet by mouth Daily.,  Disp: 30 tablet, Rfl: 2  •  traMADol (ULTRAM) 50 MG tablet, TAKE ONE TABLET BY MOUTH THREE TIMES A DAY AS NEEDED FOR MODERATE PAIN, Disp: 90 tablet, Rfl: 0  •  vitamin D (ERGOCALCIFEROL) 1.25 MG (17947 UT) capsule capsule, Take 1 capsule by mouth 1 (One) Time Per Week., Disp: 4 capsule, Rfl: 2       Plan of care reviewed with the patient at the conclusion of today's visit.  Education was provided regarding diagnosis, management, and any prescribed or recommended OTC medications.  Patient verbalized understanding of and agreement with management plan.     Return in about 3 months (around 1/5/2021), or if symptoms worsen or fail to improve.      Alexa Trujillo, MACY    Please note that portions of this note were completed with a voice recognition program. Efforts were made to edit the dictations, but occasionally words are mistranscribed.

## 2020-10-15 DIAGNOSIS — M51.36 DEGENERATION OF INTERVERTEBRAL DISC OF LUMBAR REGION: ICD-10-CM

## 2020-10-15 DIAGNOSIS — N80.9 ENDOMETRIOSIS: ICD-10-CM

## 2020-10-15 RX ORDER — TRAMADOL HYDROCHLORIDE 50 MG/1
TABLET ORAL
Qty: 90 TABLET | Refills: 0 | Status: SHIPPED | OUTPATIENT
Start: 2020-10-15 | End: 2020-11-16

## 2020-10-19 DIAGNOSIS — M51.36 DEGENERATION OF INTERVERTEBRAL DISC OF LUMBAR REGION: ICD-10-CM

## 2020-10-19 DIAGNOSIS — N80.9 ENDOMETRIOSIS: ICD-10-CM

## 2020-10-19 RX ORDER — CYCLOBENZAPRINE HCL 5 MG
5 TABLET ORAL 2 TIMES DAILY PRN
Qty: 60 TABLET | Refills: 2 | Status: SHIPPED | OUTPATIENT
Start: 2020-10-19 | End: 2021-01-06

## 2020-11-14 DIAGNOSIS — N80.9 ENDOMETRIOSIS: ICD-10-CM

## 2020-11-14 DIAGNOSIS — M51.36 DEGENERATION OF INTERVERTEBRAL DISC OF LUMBAR REGION: ICD-10-CM

## 2020-11-16 RX ORDER — TRAMADOL HYDROCHLORIDE 50 MG/1
TABLET ORAL
Qty: 90 TABLET | Refills: 0 | Status: SHIPPED | OUTPATIENT
Start: 2020-11-16 | End: 2020-12-10

## 2020-12-10 DIAGNOSIS — M51.36 DEGENERATION OF INTERVERTEBRAL DISC OF LUMBAR REGION: ICD-10-CM

## 2020-12-10 DIAGNOSIS — N80.9 ENDOMETRIOSIS: ICD-10-CM

## 2020-12-10 RX ORDER — TRAMADOL HYDROCHLORIDE 50 MG/1
TABLET ORAL
Qty: 90 TABLET | Refills: 0 | Status: SHIPPED | OUTPATIENT
Start: 2020-12-10 | End: 2021-01-06

## 2021-01-04 ENCOUNTER — OFFICE VISIT (OUTPATIENT)
Dept: INTERNAL MEDICINE | Facility: CLINIC | Age: 41
End: 2021-01-04

## 2021-01-04 VITALS
DIASTOLIC BLOOD PRESSURE: 76 MMHG | RESPIRATION RATE: 16 BRPM | BODY MASS INDEX: 39.86 KG/M2 | SYSTOLIC BLOOD PRESSURE: 122 MMHG | OXYGEN SATURATION: 98 % | TEMPERATURE: 97.3 F | WEIGHT: 263 LBS | HEART RATE: 87 BPM | HEIGHT: 68 IN

## 2021-01-04 DIAGNOSIS — Z12.31 ENCOUNTER FOR SCREENING MAMMOGRAM FOR MALIGNANT NEOPLASM OF BREAST: ICD-10-CM

## 2021-01-04 DIAGNOSIS — M54.50 LUMBAR PAIN: ICD-10-CM

## 2021-01-04 DIAGNOSIS — B35.1 ONYCHOMYCOSIS: ICD-10-CM

## 2021-01-04 DIAGNOSIS — N80.9 ENDOMETRIOSIS: Primary | ICD-10-CM

## 2021-01-04 PROCEDURE — 99214 OFFICE O/P EST MOD 30 MIN: CPT | Performed by: NURSE PRACTITIONER

## 2021-01-04 RX ORDER — TERBINAFINE HYDROCHLORIDE 250 MG/1
250 TABLET ORAL DAILY
Qty: 30 TABLET | Refills: 2 | Status: SHIPPED | OUTPATIENT
Start: 2021-01-04 | End: 2021-05-28

## 2021-01-04 NOTE — PROGRESS NOTES
Subjective   Chief Complaint   Patient presents with   • Back Pain     f/u      April Cameron is a 41 y.o. female here today for pelvic pain, low back pain, and toenail fungus.  Patient was diagnosed with endometriosis in her 20s and pelvic pain has progressively worsened over the years.  She has not had a exploratory surgery since she was in her 20s.  Last Pap was about 2 years ago and was normal.  She needs to get established with gynecology.  She has low back pain associated with degenerative disc disease.  She has been prescribed tramadol for years that helps with her pelvic and low back pain.  She has toenail fungus that has greatly improved with Lamisil.  She had labs completed and scanned to media prior to starting Lamisil with normal liver function.  She has healthy nail growth that is doing well.  No shortness of breath or chest pain.    I have reviewed the following portions of the patient's history and confirmed they are accurate: allergies, current medications, past family history, past medical history, past social history, past surgical history and problem list    I have personally completed the patient's review of systems.    Review of Systems   Constitutional: Positive for fatigue. Negative for activity change, appetite change, chills, diaphoresis, fever, unexpected weight gain and unexpected weight loss.   HENT: Negative for congestion, ear discharge, ear pain, mouth sores, nosebleeds, rhinorrhea, sinus pressure, sneezing and sore throat.    Eyes: Negative for pain, discharge and itching.   Respiratory: Negative for cough, chest tightness, shortness of breath and wheezing.    Cardiovascular: Negative for chest pain, palpitations and leg swelling.   Gastrointestinal: Positive for abdominal pain. Negative for constipation, diarrhea, nausea and vomiting.   Endocrine: Negative for heat intolerance, polydipsia and polyphagia.   Genitourinary: Positive for menstrual problem and pelvic pain. Negative for  "dysuria, flank pain, frequency, hematuria and urgency.   Musculoskeletal: Positive for arthralgias, back pain and myalgias. Negative for gait problem, joint swelling, neck pain and neck stiffness.   Skin: Negative for color change, dry skin, pallor and rash.        Thick yellowish toenail   Allergic/Immunologic: Positive for environmental allergies. Negative for immunocompromised state.   Neurological: Negative for seizures, speech difficulty, weakness and numbness.   Hematological: Negative for adenopathy.   Psychiatric/Behavioral: Negative for agitation, decreased concentration, sleep disturbance, suicidal ideas and depressed mood. The patient is not nervous/anxious.        Current Outpatient Medications on File Prior to Visit   Medication Sig   • clotrimazole-betamethasone (LOTRISONE) 1-0.05 % cream Apply  topically to the appropriate area as directed Every 12 (Twelve) Hours. Apply sparingly to the affected area(s) twice daily   • fluticasone (FLONASE) 50 MCG/ACT nasal spray 2 sprays into the nostril(s) as directed by provider Daily. Prn allergies   • ibuprofen (IBU) 800 MG tablet Take 1 tablet by mouth Every 6 (Six) Hours As Needed for Mild Pain .   • vitamin D (ERGOCALCIFEROL) 1.25 MG (52463 UT) capsule capsule Take 1 capsule by mouth 1 (One) Time Per Week.     No current facility-administered medications on file prior to visit.        Objective   Vitals:    01/04/21 1342   BP: 122/76   Pulse: 87   Resp: 16   Temp: 97.3 °F (36.3 °C)   TempSrc: Temporal   SpO2: 98%   Weight: 119 kg (263 lb)   Height: 172.7 cm (68\")     Body mass index is 39.99 kg/m².    Physical Exam  Vitals signs reviewed.   Constitutional:       Appearance: Normal appearance. She is well-developed.   HENT:      Head: Normocephalic and atraumatic.      Nose: Nose normal.   Eyes:      General: Lids are normal.      Conjunctiva/sclera: Conjunctivae normal.      Pupils: Pupils are equal, round, and reactive to light.   Neck:      Thyroid: No " thyromegaly.      Trachea: Trachea normal.   Pulmonary:      Effort: Pulmonary effort is normal. No respiratory distress.   Skin:     General: Skin is warm and dry.      Comments: Large right toenail is thick and yellowish with 3/4 healthy nail growth from the bed.   Neurological:      Mental Status: She is alert and oriented to person, place, and time.      GCS: GCS eye subscore is 4. GCS verbal subscore is 5. GCS motor subscore is 6.   Psychiatric:         Attention and Perception: Attention normal.         Mood and Affect: Mood normal.         Speech: Speech normal.         Behavior: Behavior normal. Behavior is cooperative.         Assessment/Plan   Problem List Items Addressed This Visit        Genitourinary and Reproductive     Endometriosis - Primary    Overview     Chronic issue unstable requiring medication management, monitoring, and follow up with speciality. Continue Tramadol PRN. Referred to gynecology.          Relevant Orders    Ambulatory Referral to Obstetrics / Gynecology       Skin    Onychomycosis    Overview     Chronic issue unstable requiring medication management and monitoring. Discussed skin and nail hygiene. Continue lamisil. Labs will be repeated at next appt.          Relevant Medications    terbinafine (lamiSIL) 250 MG tablet      Other Visit Diagnoses     Lumbar pain      Chronic issue stable requiring medication management and monitoring. Will eat well balanced diet, increase water intake, increase physical activity as tolerated, and get adequate rest. Can use warmth or ice for discomfort in this area. Discussed stretching exercises.   Continue tramadol PRN.       Encounter for screening mammogram for malignant neoplasm of breast        Relevant Orders    Mammo Screening Digital Tomosynthesis Bilateral With CAD             Current Outpatient Medications:   •  clotrimazole-betamethasone (LOTRISONE) 1-0.05 % cream, Apply  topically to the appropriate area as directed Every 12 (Twelve)  Hours. Apply sparingly to the affected area(s) twice daily, Disp: 45 g, Rfl: 5  •  fluticasone (FLONASE) 50 MCG/ACT nasal spray, 2 sprays into the nostril(s) as directed by provider Daily. Prn allergies, Disp: 16 g, Rfl: 5  •  ibuprofen (IBU) 800 MG tablet, Take 1 tablet by mouth Every 6 (Six) Hours As Needed for Mild Pain ., Disp: 90 tablet, Rfl: 5  •  terbinafine (lamiSIL) 250 MG tablet, Take 1 tablet by mouth Daily., Disp: 30 tablet, Rfl: 2  •  vitamin D (ERGOCALCIFEROL) 1.25 MG (77305 UT) capsule capsule, Take 1 capsule by mouth 1 (One) Time Per Week., Disp: 4 capsule, Rfl: 2  •  cyclobenzaprine (FLEXERIL) 5 MG tablet, TAKE ONE TABLET BY MOUTH TWICE A DAY AS NEEDED FOR MUSCLE SPASMS, Disp: 60 tablet, Rfl: 1  •  traMADol (ULTRAM) 50 MG tablet, TAKE ONE TABLET BY MOUTH THREE TIMES A DAY AS NEEDED FOR MODERATE PAIN, Disp: 90 tablet, Rfl: 0       Plan of care reviewed with the patient at the conclusion of today's visit.  Education was provided regarding diagnosis, management, and any prescribed or recommended OTC medications.  Patient verbalized understanding of and agreement with management plan.     Return in 3 months (on 4/4/2021), or if symptoms worsen or fail to improve.      MACY [place]    Please note that portions of this note were completed with a voice recognition program. Efforts were made to edit the dictations, but occasionally words are mistranscribed.

## 2021-01-05 DIAGNOSIS — N80.9 ENDOMETRIOSIS: ICD-10-CM

## 2021-01-05 DIAGNOSIS — M51.36 DEGENERATION OF INTERVERTEBRAL DISC OF LUMBAR REGION: ICD-10-CM

## 2021-01-06 RX ORDER — TRAMADOL HYDROCHLORIDE 50 MG/1
TABLET ORAL
Qty: 90 TABLET | Refills: 0 | Status: SHIPPED | OUTPATIENT
Start: 2021-01-06 | End: 2021-02-09

## 2021-01-06 RX ORDER — CYCLOBENZAPRINE HCL 5 MG
TABLET ORAL
Qty: 60 TABLET | Refills: 1 | Status: SHIPPED | OUTPATIENT
Start: 2021-01-06 | End: 2021-03-10

## 2021-02-09 DIAGNOSIS — M51.36 DEGENERATION OF INTERVERTEBRAL DISC OF LUMBAR REGION: ICD-10-CM

## 2021-02-09 DIAGNOSIS — N80.9 ENDOMETRIOSIS: ICD-10-CM

## 2021-02-09 RX ORDER — TRAMADOL HYDROCHLORIDE 50 MG/1
TABLET ORAL
Qty: 90 TABLET | Refills: 0 | Status: SHIPPED | OUTPATIENT
Start: 2021-02-09 | End: 2021-03-10

## 2021-03-10 DIAGNOSIS — M51.36 DEGENERATION OF INTERVERTEBRAL DISC OF LUMBAR REGION: ICD-10-CM

## 2021-03-10 DIAGNOSIS — N80.9 ENDOMETRIOSIS: ICD-10-CM

## 2021-03-10 RX ORDER — CYCLOBENZAPRINE HCL 5 MG
TABLET ORAL
Qty: 60 TABLET | Refills: 5 | Status: SHIPPED | OUTPATIENT
Start: 2021-03-10 | End: 2021-09-10

## 2021-03-10 RX ORDER — TRAMADOL HYDROCHLORIDE 50 MG/1
TABLET ORAL
Qty: 90 TABLET | Refills: 1 | Status: SHIPPED | OUTPATIENT
Start: 2021-03-10 | End: 2021-04-06 | Stop reason: SDUPTHER

## 2021-03-15 ENCOUNTER — HOSPITAL ENCOUNTER (OUTPATIENT)
Dept: MAMMOGRAPHY | Facility: HOSPITAL | Age: 41
Discharge: HOME | End: 2021-03-15
Admitting: NURSE PRACTITIONER

## 2021-03-15 DIAGNOSIS — Z12.31 ENCOUNTER FOR SCREENING MAMMOGRAM FOR MALIGNANT NEOPLASM OF BREAST: ICD-10-CM

## 2021-03-15 PROCEDURE — 77063 BREAST TOMOSYNTHESIS BI: CPT | Performed by: RADIOLOGY

## 2021-03-15 PROCEDURE — 77067 SCR MAMMO BI INCL CAD: CPT

## 2021-03-15 PROCEDURE — 77067 SCR MAMMO BI INCL CAD: CPT | Performed by: RADIOLOGY

## 2021-03-15 PROCEDURE — 77063 BREAST TOMOSYNTHESIS BI: CPT

## 2021-03-22 ENCOUNTER — OFFICE VISIT (OUTPATIENT)
Dept: OBSTETRICS AND GYNECOLOGY | Facility: CLINIC | Age: 41
End: 2021-03-22

## 2021-03-22 VITALS
SYSTOLIC BLOOD PRESSURE: 130 MMHG | BODY MASS INDEX: 39.8 KG/M2 | WEIGHT: 262.6 LBS | HEIGHT: 68 IN | DIASTOLIC BLOOD PRESSURE: 86 MMHG

## 2021-03-22 DIAGNOSIS — Z01.419 ENCOUNTER FOR GYNECOLOGICAL EXAMINATION WITHOUT ABNORMAL FINDING: Primary | ICD-10-CM

## 2021-03-22 PROCEDURE — 99386 PREV VISIT NEW AGE 40-64: CPT | Performed by: OBSTETRICS & GYNECOLOGY

## 2021-03-22 NOTE — PROGRESS NOTES
Chief Complaint   Patient presents with   • Gynecologic Exam     New GYN, establish care.  Annual exam.   • Dysmenorrhea   • Pelvic Pain     history of endometriosis   • Dermatitis     mons pubis       Alena Barnes is a 41 y.o. year old  presenting to be seen for her annual exam.  This patient is here for her her first gynecologic wellness visit with me.  She has previously had a  section for fetal distress.  She had previously had a laparoscopy with a diagnosis of endometriosis.  She is treated with ibuprofen, Ultram, and clonazepam for dysmenorrhea by her primary care physician.  This regimen is effective.  She has no intermenstrual bleeding.  She denies bowel or urinary symptoms.    SCREENING TESTS  No Pap data  Year 2012   Age                         PAP                         HPV high risk                         Mammogram          abnl               RAZIA score                         Breast MRI                         Lipids                         Vitamin D                         Colonoscopy                         DEXA  Frax (hip/any)                         Ovarian Screen                             She exercises regularly: no.  She wears her seat belt: yes.  She has concerns about domestic violence: no.  She has noticed changes in height: no    GYN screening history:  · Last mammogram: was done on approximately 3/15/2021 and the result was: Birads 0 (Incomplete)..    No Additional Complaints Reported    The following portions of the patient's history were reviewed and updated as appropriate:vital signs and   She  has a past medical history of Backache, Blood in stool, Endometriosis, Gastritis, Hemorrhoids, Ingrown nail of great toe of left foot, and Tinea.  She does not have any pertinent problems on file.  She  has a past surgical history that includes  section and Other  "surgical history.  Her family history includes Alcohol abuse in her mother; Cancer in her mother; Fibrocystic breast disease in an other family member; Heart disease in her father; Mental illness in her mother; Osteoporosis in an other family member.  She  reports that she has quit smoking. She has never used smokeless tobacco. She reports that she does not drink alcohol and does not use drugs.  Current Outpatient Medications   Medication Sig Dispense Refill   • cyclobenzaprine (FLEXERIL) 5 MG tablet TAKE ONE TABLET BY MOUTH TWICE A DAY AS NEEDED FOR MUSCLE SPASMS 60 tablet 5   • ibuprofen (IBU) 800 MG tablet Take 1 tablet by mouth Every 6 (Six) Hours As Needed for Mild Pain . 90 tablet 5   • terbinafine (lamiSIL) 250 MG tablet Take 1 tablet by mouth Daily. 30 tablet 2   • traMADol (ULTRAM) 50 MG tablet TAKE ONE TABLET BY MOUTH THREE TIMES A DAY AS NEEDED FOR MODERATE PAIN 90 tablet 1     No current facility-administered medications for this visit.     She is allergic to penicillins..    Review of Systems  A review of systems was taken.  She denies cough, fever, shortness of breath, and loss of her sense of taste or smell  Constitutional: negative for fever, chills, activity change, appetite change, fatigue and unexpected weight change.  Respiratory: negative  Cardiovascular: negative  Gastrointestinal: negative  Genitourinary:negative  Musculoskeletal:negative  Behavioral/Psych: negative     Counseling/Anticipatory Guidance Discussed: nutrition, physical activity, healthy weight, immunizations, screenings and self-breast exam      /86   Ht 172.7 cm (68\")   Wt 119 kg (262 lb 9.6 oz)   LMP 03/01/2021   Breastfeeding No   BMI 39.93 kg/m²     MEDICALLY INDICATED   Physical Exam    General:  alert; cooperative; well developed; well nourished  obese - Body mass index is 39.93 kg/m².   Skin:  No suspicious lesions seen   Thyroid: normal to inspection and palpation   Lungs:  breathing is unlabored  clear to " auscultation bilaterally   Heart:  regular rate and rhythm, S1, S2 normal, no murmur, click, rub or gallop  normal apical impulse   Breasts:  Examined in supine position  Symmetric without masses or skin dimpling  Nipples normal without inversion, lesions or discharge  There are no palpable axillary nodes   Abdomen: soft, non-tender; no masses  no umbilical or inguinal hernias are present  no hepato-splenomegaly   Pelvis: Clinical staff was present for exam  External genitalia:  normal appearance of the external genitalia including Bartholin's and [place]'s glands.  Vaginal:  normal pink mucosa without prolapse or lesions.  Cervix:  normal appearance.  Uterus:  normal size, shape and consistency. midplane  Adnexa:  normal bimanual exam of the adnexa.  Rectal:  anus visually normal appearing.     Lab Review   CBC results and TSH results    Imaging  Mammogram results        Advance directives- NO (age)      ASSESSMENT  Problems Addressed this Visit     None      Visit Diagnoses     Encounter for gynecological examination without abnormal finding    -  Primary    Relevant Orders    Liquid-based Pap Smear, Screening      Diagnoses       Codes Comments    Encounter for gynecological examination without abnormal finding    -  Primary ICD-10-CM: Z01.419  ICD-9-CM: V72.31               Substance History:   reports that she has quit smoking. She has never used smokeless tobacco.   reports no history of alcohol use.   reports no history of drug use.    Substance use counseling is not indicated based on patient history.      PLAN    · Medications prescribed this encounter:  No orders of the defined types were placed in this encounter.  · Monthly self breast assessment, follow-up breast imaging  · Pap test done  · Calcium, 600 mg/ Vit. D, 400 IU daily; regular weight-bearing exercise  · Follow up: 12 month(s)  *Please note that portions of this documentation may have been completed with a voice recognition program.  Efforts were  made to edit this dictation, but occasional words may have been mistranscribed.       This note was electronically signed.    DARCIE Cho MD  March 22, 2021  16:08 EDT

## 2021-04-01 ENCOUNTER — APPOINTMENT (OUTPATIENT)
Dept: MAMMOGRAPHY | Facility: HOSPITAL | Age: 41
End: 2021-04-01

## 2021-04-06 ENCOUNTER — OFFICE VISIT (OUTPATIENT)
Dept: INTERNAL MEDICINE | Facility: CLINIC | Age: 41
End: 2021-04-06

## 2021-04-06 VITALS
BODY MASS INDEX: 40.32 KG/M2 | WEIGHT: 266 LBS | SYSTOLIC BLOOD PRESSURE: 148 MMHG | TEMPERATURE: 97.1 F | RESPIRATION RATE: 16 BRPM | HEART RATE: 84 BPM | OXYGEN SATURATION: 98 % | DIASTOLIC BLOOD PRESSURE: 90 MMHG | HEIGHT: 68 IN

## 2021-04-06 DIAGNOSIS — M51.36 DEGENERATION OF INTERVERTEBRAL DISC OF LUMBAR REGION: ICD-10-CM

## 2021-04-06 DIAGNOSIS — N80.9 ENDOMETRIOSIS: Primary | ICD-10-CM

## 2021-04-06 DIAGNOSIS — B35.1 ONYCHOMYCOSIS: ICD-10-CM

## 2021-04-06 DIAGNOSIS — N80.9 ENDOMETRIOSIS: ICD-10-CM

## 2021-04-06 DIAGNOSIS — N91.2 AMENORRHEA: ICD-10-CM

## 2021-04-06 LAB
B-HCG UR QL: NEGATIVE
INTERNAL NEGATIVE CONTROL: NORMAL
INTERNAL POSITIVE CONTROL: NORMAL
Lab: NORMAL

## 2021-04-06 PROCEDURE — 81025 URINE PREGNANCY TEST: CPT | Performed by: NURSE PRACTITIONER

## 2021-04-06 PROCEDURE — 99214 OFFICE O/P EST MOD 30 MIN: CPT | Performed by: NURSE PRACTITIONER

## 2021-04-06 RX ORDER — TRAMADOL HYDROCHLORIDE 50 MG/1
50 TABLET ORAL 3 TIMES DAILY PRN
Qty: 90 TABLET | Refills: 2 | Status: SHIPPED | OUTPATIENT
Start: 2021-04-06 | End: 2021-08-16 | Stop reason: SDUPTHER

## 2021-04-06 NOTE — PROGRESS NOTES
Subjective   Chief Complaint   Patient presents with   • Endometriosis     f/u   • Amenorrhea      April JUAN JOSE Barnes is a 41 y.o. female here today for pelvic pain and missed menstrual cycle. She continues to have pelvic and abdominal pain related to endometriosis. Had appt with gynecologist, Dr. Cho, on 22nd and had pap that was normal. He suggested continuing with tramadol and IBU until pain is no longer manageable and then he will do lap procedure. She was supposed to start menstrual cycle on 26th and missed this. No increased pelvic or abdominal pain. She has never missed a cycle before. Concerned about pregnancy. Continues to take lamisil and has healthy nail growth almost completely. Will finish out this script and stop.     I have reviewed the following portions of the patient's history and confirmed they are accurate: allergies, current medications, past family history, past medical history, past social history, past surgical history and problem list    I have personally completed the patient's review of systems.    Review of Systems   Constitutional: Positive for fatigue. Negative for activity change, appetite change, chills, diaphoresis, fever, unexpected weight gain and unexpected weight loss.   HENT: Negative for congestion, ear discharge, ear pain, mouth sores, nosebleeds, rhinorrhea, sinus pressure, sneezing and sore throat.    Eyes: Negative for pain, discharge and itching.   Respiratory: Negative for cough, chest tightness, shortness of breath and wheezing.    Cardiovascular: Negative for chest pain, palpitations and leg swelling.   Gastrointestinal: Positive for abdominal pain. Negative for constipation, diarrhea, nausea and vomiting.   Endocrine: Negative for heat intolerance, polydipsia and polyphagia.   Genitourinary: Positive for menstrual problem and pelvic pain. Negative for dysuria, flank pain, frequency, hematuria and urgency.   Musculoskeletal: Positive for arthralgias, back pain and  "myalgias. Negative for gait problem, joint swelling, neck pain and neck stiffness.   Skin: Negative for color change, dry skin, pallor and rash.   Allergic/Immunologic: Negative for environmental allergies and immunocompromised state.   Neurological: Negative for seizures, speech difficulty, weakness and numbness.   Hematological: Negative for adenopathy.   Psychiatric/Behavioral: Negative for agitation, decreased concentration, sleep disturbance, suicidal ideas and depressed mood. The patient is not nervous/anxious.        Current Outpatient Medications on File Prior to Visit   Medication Sig   • cyclobenzaprine (FLEXERIL) 5 MG tablet TAKE ONE TABLET BY MOUTH TWICE A DAY AS NEEDED FOR MUSCLE SPASMS   • terbinafine (lamiSIL) 250 MG tablet Take 1 tablet by mouth Daily.     No current facility-administered medications on file prior to visit.       Objective   Vitals:    04/06/21 1544   BP: 148/90   Pulse: 84   Resp: 16   Temp: 97.1 °F (36.2 °C)   TempSrc: Temporal   SpO2: 98%   Weight: 121 kg (266 lb)   Height: 172.7 cm (68\")     Body mass index is 40.45 kg/m².    Physical Exam  Vitals reviewed.   Constitutional:       Appearance: Normal appearance. She is well-developed.   HENT:      Head: Normocephalic and atraumatic.      Nose: Nose normal.   Eyes:      General: Lids are normal.      Conjunctiva/sclera: Conjunctivae normal.      Pupils: Pupils are equal, round, and reactive to light.   Neck:      Thyroid: No thyromegaly.      Trachea: Trachea normal.   Pulmonary:      Effort: Pulmonary effort is normal. No respiratory distress.   Skin:     General: Skin is warm and dry.      Comments: Large right toenail has healthy growth with small areas in top corners of yellowish color.    Neurological:      Mental Status: She is alert and oriented to person, place, and time.      GCS: GCS eye subscore is 4. GCS verbal subscore is 5. GCS motor subscore is 6.   Psychiatric:         Attention and Perception: Attention normal.    "      Mood and Affect: Mood normal.         Speech: Speech normal.         Behavior: Behavior normal. Behavior is cooperative.         Assessment/Plan   Problem List Items Addressed This Visit        Genitourinary and Reproductive     Endometriosis - Primary    Overview     Chronic issue stable requiring medication management, monitoring, and follow up with speciality. Continue Tramadol and IBU PRN. Continue follow ups with gynecology.          Relevant Medications    traMADol (ULTRAM) 50 MG tablet       Skin    Onychomycosis    Overview     Chronic issue stable requiring medication management and monitoring. Discussed skin and nail hygiene. Finsish lamisil script and stop.            Other Visit Diagnoses     Amenorrhea      New issue requiring further work up. Pregnancy test is negative. If misses another cycle will follow up with gynecologist.     Relevant Orders    POCT pregnancy, urine (Completed)             Current Outpatient Medications:   •  cyclobenzaprine (FLEXERIL) 5 MG tablet, TAKE ONE TABLET BY MOUTH TWICE A DAY AS NEEDED FOR MUSCLE SPASMS, Disp: 60 tablet, Rfl: 5  •  terbinafine (lamiSIL) 250 MG tablet, Take 1 tablet by mouth Daily., Disp: 30 tablet, Rfl: 2  •  traMADol (ULTRAM) 50 MG tablet, Take 1 tablet by mouth 3 (Three) Times a Day As Needed for Moderate Pain . moderate pain, Disp: 90 tablet, Rfl: 2  •  ibuprofen (ADVIL,MOTRIN) 800 MG tablet, TAKE ONE TABLET BY MOUTH EVERY 6 HOURS AS NEEDED FOR MILD PAIN, Disp: 90 tablet, Rfl: 4       Plan of care reviewed with the patient at the conclusion of today's visit.  Education was provided regarding diagnosis, management, and any prescribed or recommended OTC medications.  Patient verbalized understanding of and agreement with management plan.     Return in about 3 months (around 7/6/2021), or if symptoms worsen or fail to improve.      MACY [place]    Please note that portions of this note were completed with a voice recognition program.  Efforts were made to edit the dictations, but occasionally words are mistranscribed.

## 2021-04-07 RX ORDER — IBUPROFEN 800 MG/1
TABLET ORAL
Qty: 90 TABLET | Refills: 4 | Status: SHIPPED | OUTPATIENT
Start: 2021-04-07 | End: 2021-08-09

## 2021-04-14 ENCOUNTER — HOSPITAL ENCOUNTER (OUTPATIENT)
Dept: MAMMOGRAPHY | Facility: HOSPITAL | Age: 41
Discharge: HOME | End: 2021-04-14

## 2021-04-14 ENCOUNTER — HOSPITAL ENCOUNTER (OUTPATIENT)
Dept: ULTRASOUND IMAGING | Facility: HOSPITAL | Age: 41
Discharge: HOME | End: 2021-04-14

## 2021-04-14 DIAGNOSIS — R92.8 ABNORMAL MAMMOGRAM: ICD-10-CM

## 2021-04-14 PROCEDURE — G0279 TOMOSYNTHESIS, MAMMO: HCPCS

## 2021-04-14 PROCEDURE — 88360 TUMOR IMMUNOHISTOCHEM/MANUAL: CPT | Performed by: NURSE PRACTITIONER

## 2021-04-14 PROCEDURE — 77066 DX MAMMO INCL CAD BI: CPT | Performed by: RADIOLOGY

## 2021-04-14 PROCEDURE — 77062 BREAST TOMOSYNTHESIS BI: CPT | Performed by: RADIOLOGY

## 2021-04-14 PROCEDURE — 88305 TISSUE EXAM BY PATHOLOGIST: CPT | Performed by: NURSE PRACTITIONER

## 2021-04-14 PROCEDURE — 76642 ULTRASOUND BREAST LIMITED: CPT | Performed by: RADIOLOGY

## 2021-04-14 PROCEDURE — 88342 IMHCHEM/IMCYTCHM 1ST ANTB: CPT | Performed by: NURSE PRACTITIONER

## 2021-04-14 PROCEDURE — 25010000003 LIDOCAINE 1 % SOLUTION: Performed by: RADIOLOGY

## 2021-04-14 PROCEDURE — 19083 BX BREAST 1ST LESION US IMAG: CPT | Performed by: RADIOLOGY

## 2021-04-14 PROCEDURE — 76642 ULTRASOUND BREAST LIMITED: CPT

## 2021-04-14 PROCEDURE — A4648 IMPLANTABLE TISSUE MARKER: HCPCS

## 2021-04-14 PROCEDURE — 77066 DX MAMMO INCL CAD BI: CPT

## 2021-04-14 RX ORDER — LIDOCAINE HYDROCHLORIDE AND EPINEPHRINE 10; 10 MG/ML; UG/ML
5 INJECTION, SOLUTION INFILTRATION; PERINEURAL ONCE
Status: COMPLETED | OUTPATIENT
Start: 2021-04-14 | End: 2021-04-14

## 2021-04-14 RX ORDER — LIDOCAINE HYDROCHLORIDE 10 MG/ML
5 INJECTION, SOLUTION INFILTRATION; PERINEURAL ONCE
Status: COMPLETED | OUTPATIENT
Start: 2021-04-14 | End: 2021-04-14

## 2021-04-14 RX ADMIN — LIDOCAINE HYDROCHLORIDE,EPINEPHRINE BITARTRATE 6 ML: 10; .01 INJECTION, SOLUTION INFILTRATION; PERINEURAL at 16:39

## 2021-04-14 RX ADMIN — LIDOCAINE HYDROCHLORIDE 1 ML: 10 INJECTION, SOLUTION INFILTRATION; PERINEURAL at 16:37

## 2021-04-19 ENCOUNTER — TELEPHONE (OUTPATIENT)
Dept: MAMMOGRAPHY | Facility: HOSPITAL | Age: 41
End: 2021-04-19

## 2021-04-19 LAB
CYTO UR: NORMAL
LAB AP CASE REPORT: NORMAL
LAB AP CLINICAL INFORMATION: NORMAL
LAB AP DIAGNOSIS COMMENT: NORMAL
LAB AP SPECIAL STAINS: NORMAL
PATH REPORT.FINAL DX SPEC: NORMAL
PATH REPORT.GROSS SPEC: NORMAL

## 2021-04-19 NOTE — TELEPHONE ENCOUNTER
Referring provider's office notified pathology returned as cancer & patient will be notified. Pt notified of pathology results and recommendation. Verbalizes understanding. Denies discomfort. Denies signs and symptoms of infection.     Pt desires to research surgeon choice. Pt is to call back with decision; an appointment will then be scheduled and she will be notified. Verbalizes understanding. Reviewed what would be discussed at surgical consult visit, including detailed explanation of pathology report & imaging reports; treatment options & pros/cons, availability of nurse navigator. Patient encouraged to call back or contact BARI Raphael RN, OCN, Breast Navigator, with any questions or concerns. Pt information sent to BRAI Raphael RN, OCN, Breast Navigator for evaluation & referral for possible genetic counseling. Breast cancer information packet offered and accepted. Patient verbalized understanding.

## 2021-04-21 ENCOUNTER — NURSE NAVIGATOR (OUTPATIENT)
Dept: OTHER | Facility: HOSPITAL | Age: 41
End: 2021-04-21

## 2021-04-21 NOTE — PROGRESS NOTES
Left a VM for patient regarding eligibility for genetic testing and counseling. Will follow up with her.

## 2021-04-22 ENCOUNTER — TELEPHONE (OUTPATIENT)
Dept: MAMMOGRAPHY | Facility: HOSPITAL | Age: 41
End: 2021-04-22

## 2021-04-22 NOTE — TELEPHONE ENCOUNTER
Pt notified of surgical consult appointment with Dr. Noguera on 4.28.21 @ 5839. Pt given office contact & location information. Told to bring photo ID, list of prescription & OTC medications, insurance information, must wear a mask & can bring one person for support also wearing a mask. Reviewed what would be discussed at surgical consult visit, including detailed explanation of pathology report & imaging reports; treatment options & pros/cons, availability of nurse navigator. Patient encouraged to call back or contact BARI Raphael RN, OCN, Breast Navigator, with any questions or concerns. Pt information sent to BARI Raphael RN, OCN, Breast Navigator for evaluation & referral for possible genetic counseling. Breast cancer information packet offered and accepted. Patient verbalized understanding.

## 2021-04-23 ENCOUNTER — NURSE NAVIGATOR (OUTPATIENT)
Dept: OTHER | Facility: HOSPITAL | Age: 41
End: 2021-04-23

## 2021-04-23 NOTE — PROGRESS NOTES
Discussed genetic counseling and testing with patient. She is interested and would like to proceed. She will need to come on the same day as her surgical appointment. She will also need to be seen after her appointment with Dr. FIERRO due to childcare concerns. All questions were answered and she will call with any new concerns.

## 2021-04-26 ENCOUNTER — TELEPHONE (OUTPATIENT)
Dept: GENETICS | Facility: HOSPITAL | Age: 41
End: 2021-04-26

## 2021-04-26 ENCOUNTER — TELEPHONE (OUTPATIENT)
Dept: INTERNAL MEDICINE | Facility: CLINIC | Age: 41
End: 2021-04-26

## 2021-04-26 RX ORDER — TRAZODONE HYDROCHLORIDE 50 MG/1
TABLET ORAL
Qty: 45 TABLET | Refills: 2 | Status: SHIPPED | OUTPATIENT
Start: 2021-04-26 | End: 2021-08-16

## 2021-04-26 NOTE — TELEPHONE ENCOUNTER
Pt advised of medication at pharmacy, Pt was asking when she needed to follow-up. Advised her to f/u after she see's oncology.

## 2021-04-26 NOTE — TELEPHONE ENCOUNTER
Called to coordinate Genetic appt with Dr Watkins surgical consult visit. Patient stated her child has virtual learning class until 12:30pm and she will be rushing to get to Dr Roland yoon, so she cannot schedule to see Genetics before that. I explained to her that the lab would need to be open to do the blood draw and would probably not be after her seeing VADIM. I told her I would call her after Roland appt to coordinate a different day and time with her.

## 2021-04-26 NOTE — TELEPHONE ENCOUNTER
Caller: Aelna Barnes M    Relationship: Self    Best call back number: [phone]     What medication are you requesting: TRAZODONE     What are your current symptoms: SLEEPING    How long have you been experiencing symptoms: A WEEK     Have you had these symptoms before:    [x] Yes  [] No    Have you been treated for these symptoms before:   [x] Yes  [] No    If a prescription is needed, what is your preferred pharmacy and phone number: [facility] [address] [place] - [phone] [facility] [phone]   [phone]     Additional notes: PATIENT WAS RECENTLY DIAGNOSED WITH CANCER. SHE IS HAVING TROUBLE SLEEPING. SHE IS WONDERING IF SHE CAN GET TRAZADONE AGAIN TO HELP. PATIENT IS ASKING TO BE CONTACTED BACK IF SHE NEEDS TO MAKE AN APPOINTMENT. SHE HAS AN APPOINTMENT WITH HER ONCOLOGIST LATER THIS WEEK.

## 2021-04-27 ENCOUNTER — TRANSCRIBE ORDERS (OUTPATIENT)
Dept: OCCUPATIONAL THERAPY | Facility: HOSPITAL | Age: 41
End: 2021-04-27

## 2021-04-27 DIAGNOSIS — C50.911 MALIGNANT NEOPLASM OF RIGHT FEMALE BREAST, UNSPECIFIED ESTROGEN RECEPTOR STATUS, UNSPECIFIED SITE OF BREAST (HCC): Primary | ICD-10-CM

## 2021-04-28 ENCOUNTER — HOSPITAL ENCOUNTER (OUTPATIENT)
Dept: OCCUPATIONAL THERAPY | Facility: HOSPITAL | Age: 41
Setting detail: THERAPIES SERIES
Discharge: HOME | End: 2021-04-28

## 2021-04-28 ENCOUNTER — NURSE NAVIGATOR (OUTPATIENT)
Dept: ONCOLOGY | Facility: CLINIC | Age: 41
End: 2021-04-28

## 2021-04-28 DIAGNOSIS — C50.911 MALIGNANT NEOPLASM OF RIGHT FEMALE BREAST, UNSPECIFIED ESTROGEN RECEPTOR STATUS, UNSPECIFIED SITE OF BREAST (HCC): Primary | ICD-10-CM

## 2021-04-28 DIAGNOSIS — Z17.1 MALIGNANT NEOPLASM OF RIGHT BREAST IN FEMALE, ESTROGEN RECEPTOR NEGATIVE, UNSPECIFIED SITE OF BREAST (HCC): Primary | ICD-10-CM

## 2021-04-28 DIAGNOSIS — C50.911 MALIGNANT NEOPLASM OF RIGHT BREAST IN FEMALE, ESTROGEN RECEPTOR NEGATIVE, UNSPECIFIED SITE OF BREAST (HCC): Primary | ICD-10-CM

## 2021-04-28 PROCEDURE — 97166 OT EVAL MOD COMPLEX 45 MIN: CPT

## 2021-04-29 ENCOUNTER — TELEPHONE (OUTPATIENT)
Dept: GENETICS | Facility: HOSPITAL | Age: 41
End: 2021-04-29

## 2021-04-29 PROCEDURE — 97166 OT EVAL MOD COMPLEX 45 MIN: CPT

## 2021-04-29 NOTE — THERAPY EVALUATION
Outpatient Occupational Therapy Rehab Program Initial Evaluation   [place]     Patient Name: Alena Barnes  : 1980  MRN: [id number]  Today's Date: 2021      Visit Date: 2021    Patient Active Problem List   Diagnosis   • Degeneration of intervertebral disc of lumbar region   • Eczema   • Endometriosis   • Acute gastritis   • Insomnia   • Plantar fasciitis   • Onychomycosis   • Chronic allergic rhinitis        Past Medical History:   Diagnosis Date   • Backache    • Blood in stool    • Endometriosis    • Gastritis    • Hemorrhoids    • Ingrown nail of great toe of left foot    • Tinea         Past Surgical History:   Procedure Laterality Date   •  SECTION     • OTHER SURGICAL HISTORY      treatment of lower leg fracture         Visit Dx:    ICD-10-CM ICD-9-CM   1. Malignant neoplasm of right female breast, unspecified estrogen receptor status, unspecified site of breast (CMS/[facility])  C50.911 174.9               21 1400   Subjective Pain   Able to rate subjective pain? yes   Pre-Treatment Pain Level 0   Post-Treatment Pain Level 0   Lymphedema Assessment   Lymphedema Classification RUE:;at risk/stage 0   Cancer Comments CA tx including surgical intervention TBD, waiting on MRI and genetic testing results   Chemo Received   (TBD)   Radiation Therapy Received   (TBD)   Posture/Observations   Posture- WNL Posture is WNL   General ROM   GENERAL ROM COMMENTS BUE WNL   MMT (Manual Muscle Testing)   General MMT Comments BUE WNL   Lymphedema Edema Assessment   Edema Assessment Comment Pt does not demo any edema at this time   Skin Changes/Observations   Location/Assessment Upper Extremity;Upper Quadrant   Upper Extremity Conditions bilateral:;normal   Upper Extremity Color/Pigment bilateral:;normal   Upper Quadrant Conditions bilateral:;normal   Upper Quadrant Color/Pigment bilateral:;normal   Lymphedema Sensation   Lymphedema Sensation Reports normal   Lymphedema Measurements   Measurement  Type(s) Quick Girth   Quick Girth Areas Upper extremities   RUE Quick Girth (cm)   Axilla 42 cm   Mid upper arm 34.5 cm   Elbow 30 cm   Mid forearm 30 cm   Wrist crease 19 cm   Web space 21 cm   Met-heads 20 cm   RUE Quick Girth Total 196.5   L-Dex Bioimpedence Screening   L-Dex Measurement Extremity RUE   L-Dex Patient Position Standing   L-Dex UE Dominate Side Right   L-Dex UE At Risk Side Right   L-Dex UE Pre Surgical Value Yes   L-Dex UE Baseline Score -0.7   L-Dex UE Comment In normal range           04/28/21 1400   OT Assessment   Assessment Comments Ms. Barnes presents to OT pre-operatively for planned BrCA surgery scheduled in a couple weeks. Baseline ROM, postural, and arm circumference measurements wer taken today to be compared to measurements retaken 3-4 weeks post surgery. At that time, any reduced movement, decline in function, or postural issues will be addressed with skilled care and new goals will be established.  Personal risk factors for lymphedema post-operatively for the R upper extremity and trunk quadrant were also assessed today and basic lymphedema precautions were discussed. A more detailed discussion regarding personal lymphedema risk factors will take place post-operatively once the number of lymph nodes removed and the plan for further medical care is known.   Please refer to paper survey for additional self-reported information Yes   OT Rehab Potential Excellent   Patient/caregiver participated in establishment of treatment plan and goals Yes   Patient would benefit from skilled therapy intervention Yes   OT Plan   Planned CPT's? OT EVAL MOD COMPLEXITY: 58765;OT RE-EVAL: 22174;OT THER ACT EA 15 MIN: [id number];OT THER PROC EA 15 MIN: [id number];OT SELF CARE/MGMT/TRAIN 15 MIN: 31320;OT MANUAL THERAPY EA 15 MIN: 81710;OT BIS XTRACELL FLUID ANALYSIS: 01769   Planned Therapy Interventions (Optional Details) home exercise program;joint mobilization;manual therapy techniques;patient/family  education;postural re-education;ROM (Range of Motion);strengthening;stretching   OT Plan Comments Ms. Barnes will return to OT 3-4 weeks post operatively for re-evaluation measurements to be compared to measurements taken today, at her pre-operative evaluation. In addition, she will be examined for possible post-BrCA surgery sequelae such as axillary web syndrome, scar adhesions, edema, worsened posture, scapular winging, pain, and reduced ROM and function. At that time, a future plan and goals will be established and skilled care continued if indicated. Currently, Ms Barnes has been provided with information for the free educational class offered before BrCA surgery in order to facilitate recovery and reduce the risk of post-operative sequelae.             04/28/21 1400   OT Short Term Goals   STG Date to Achieve 05/29/21   STG 1 Pt demonstrates awareness of post-operative movement restrictions and HEP to facilitate lymphatic regeneration and reduce the risk of seroma formation, axillary web syndrome, and lymphedema while ensuring shoulder joint mobility.   STG 2 Pt demonstrates understanding of post-operative basic lymphedema precautions.   Time Calculation   OT Goal Re-Cert Due Date 07/28/21       Therapy Education  Education Details: Pt was edu on prehab assessment results including Bioimpedence LDex score. Pt was edu on Healing After Breast Surgery education class to participate in to learn more about lymphedema, exercise, and optimizing healing after surgery  Given: HEP, Symptoms/condition management, Posture/body mechanics  Program: New  How Provided: Verbal, Demonstration, Written  Provided to: Patient  Level of Understanding: Verbalized              Outcome Measure Options: Quick DASH  Quick DASH  Open a tight or new jar.: Mild Difficulty  Do heavy household chores (e.g., wash walls, wash floors): No Difficulty  Carry a shopping bag or briefcase: No Difficulty  Wash your back: No Difficulty  Use a knife to  cut food: No Difficulty  Recreational activities in which you take some force or impact through your arm, should or hand (e.g. golf, hammering, tennis, etc.): No Difficulty  During the past week, to what extent has your arm, shoulder, or hand problem interfered with your normal social activites with family, friends, neighbors or groups?: Not at all  During the past week, were you limited in your work or other regular daily activities as a result of your arm, shoulder or hand problem?: Not limited at all  Arm, Shoulder, or hand pain: Mild  Tingling (pins and needles) in your arm, shoulder, or hand: None  During the past week, how much difficulty have you had sleeping because of the pain in your arm, shoulder or hand?: No difficulty  Number of Questions Answered: 11  Quick DASH Score: 4.55         Time Calculation:   OT Start Time: 1400  Untimed Charges  OT Eval/Re-eval Minutes: 55  Total Minutes  Untimed Charges Total Minutes: 55   Total Minutes: 55     Therapy Charges for Today     Code Description Service Date Service Provider Modifiers Qty    57017355406  OT EVAL MOD COMPLEXITY 4 4/28/2021 Lucie Curiel OTR/L GO 1                  CATE Quiñones/STACEY  4/29/2021

## 2021-04-30 ENCOUNTER — CLINICAL SUPPORT (OUTPATIENT)
Dept: GENETICS | Facility: HOSPITAL | Age: 41
End: 2021-04-30

## 2021-04-30 ENCOUNTER — LAB (OUTPATIENT)
Dept: LAB | Facility: HOSPITAL | Age: 41
End: 2021-04-30

## 2021-04-30 DIAGNOSIS — Z17.0 MALIGNANT NEOPLASM OF BREAST IN FEMALE, ESTROGEN RECEPTOR POSITIVE, UNSPECIFIED LATERALITY, UNSPECIFIED SITE OF BREAST (HCC): Primary | ICD-10-CM

## 2021-04-30 DIAGNOSIS — C50.919 MALIGNANT NEOPLASM OF BREAST IN FEMALE, ESTROGEN RECEPTOR POSITIVE, UNSPECIFIED LATERALITY, UNSPECIFIED SITE OF BREAST (HCC): Primary | ICD-10-CM

## 2021-04-30 DIAGNOSIS — Z13.79 GENETIC TESTING: Primary | ICD-10-CM

## 2021-04-30 NOTE — PROGRESS NOTES
Alena Barnes is a 41-year-old female who was seen for genetic counseling due to a personal and family history of breast cancer.  Genetic counseling was provided via telehealth.  Ms. Barnes was diagnosed with an ER/MT+ breast cancer at age 41.  Ms. Barnes is premenopausal and retains her uterus and ovaries.  Ms. Barnes was interested in discussing her risk for a hereditary cancer syndrome, and decided to pursue genetic testing.   Ms. Barnes opted to pursue comprehensive testing via the CancerNext panel ordered through [employer] which includes BRCA1/2 and 34 additional genes associated with an increased risk of breast cancer or other cancers (APC, OSIEL, AXIN2, BARD1, BMPR1A, BRCA1, BRCA2, BRIP1, CDH1, CDK4, CDKN2A, CHEK2, DICER1, EPCAM, GREM1, HOXB13, MLH1, MRE11A, MSH2, MSH3, MSH6, MUTYH, NBN, NF1, NTHL1, PALB2, PMS2, POLD1, POLE, PTEN, RAD51C, RAD51D, RECQL, SMAD4, SMARCA4, STK11, and TP53). Results from the high/moderate risk breast cancer genes are expected in 10 days, and results from the remainder of the panel are expected in 2-3 weeks.     PERTINENT FAMILY HISTORY:  Mother:  Stomach cancer, 54    RISK ASSESSMENT:  Ms. Barnes’s personal history of early onset breast cancer raises the question of a hereditary cancer syndrome.   NCCN guidelines recommend BRCA1/2 testing for individuals diagnosed with breast cancer at or under age 45, regardless of family history.  Therefore, testing is appropriate for Ms. Barnes.  We discussed that standard approach to BRCA1/2 testing is via a multigene panel that evaluates BRCA1/2 and multiple other genes known to impact cancer risk.  This risk assessment is based on the family history information provided at the time of the appointment.  The assessment could change in the future should new information be obtained.     GENETIC COUNSELING: We reviewed the family history information in detail.  Cases of breast cancer follow three general patterns: sporadic, familial, and  hereditary.  While most cancer is sporadic, some cases appear to occur in family clusters.  These cases are said to be familial and account for 10-20% of breast cancer cases.  Familial cases may be due to a combination of shared genes and environmental factors among family members.  In even fewer cases (5-10%), the risk for cancer is inherited, and the genes responsible for the increased cancer risk are known.       Family histories typical of hereditary cancer syndromes usually include multiple first- and second-degree relatives diagnosed with cancer types that define a syndrome.  These cases tend to be diagnosed at younger-than-expected ages and can be bilateral or multifocal.  The cancer in these families follows an autosomal dominant inheritance pattern, which indicates the likely presence of a mutation in a cancer susceptibility gene.  Children and siblings of an individual known to carry a mutation have a 50% chance of inheriting that mutation, thereby inheriting the increased risk to develop cancer.  These mutations can be passed down from the maternal or the paternal lineage.     Hereditary breast cancer accounts for 5-10% of all cases of breast cancer.  A significant proportion (50%) of hereditary breast cancer can be attributed to mutations in the BRCA1 and BRCA2 genes.  Mutations in these genes confer an increased risk for breast cancer, ovarian cancer, male breast cancer, prostate cancer and pancreatic cancer.  There are other genes that are known to be associated with an increased risk for breast cancer and other cancers. In order to get as much information as possible regarding Ms. Barnes’s personal risks and potential risks for her family, testing was pursued through a multigene panel that would look at several other genes known to increase the risk for breast cancer and other cancers.     GENETIC TESTING:  The risks, benefits and limitations of genetic testing and implications for clinical management  following testing were reviewed.  DNA test results can influence decisions regarding screening, prevention and surgical management.  Genetic testing can have significant psychological implications for both individuals and families.  Also discussed was the possibility of insurance discrimination based on genetic test results and the law (ADONAY) in place to prevent this.     We discussed panel testing, which would involve testing for BRCA1/2 as well as several other cancer susceptibility genes at the same time.  The benefits and limitations of genetic testing were discussed and Ms. Barnes decided to pursue testing. The implications of a positive or negative test result were discussed. We discussed the possibility that, in some cases, genetic test results may be uninformative due to the identification of a genetic variant of uncertain significance. These variants may or may not be associated with an increased cancer risk.  VUSs are frequently reported through multigene panel testing, given the presence of genetic variation in the population and the number of genes being analyzed. Given her personal history, a negative test result would not eliminate all breast cancer risk to her relatives, although the risk would not be as high as it would with positive genetic testing.          PLAN: The patient will be contacted by telephone once results are available.  Genetic counseling remains available to Ms. Barnes, and she is welcome to contact us at [phone] with any questions she may have.        Sadaf Sorto MS, [facility], [facility]  Licensed Certified Genetic Counselor

## 2021-05-07 NOTE — PROGRESS NOTES
I saw patient with Dr FIERRO. - Dr FIERRO reviewed the pathology and surgical options - stage IA, right breast IG - HG IDC, ER/IL positive - HER negative. The patient prefers BCT - genetics has been ordered as well as MRI. Educational and supportive materials were given and reviewed - notes taken for patient.

## 2021-05-10 ENCOUNTER — HOSPITAL ENCOUNTER (OUTPATIENT)
Dept: MRI IMAGING | Facility: HOSPITAL | Age: 41
Discharge: HOME | End: 2021-05-10
Admitting: SURGERY

## 2021-05-10 DIAGNOSIS — C50.411 MALIGNANT NEOPLASM OF UPPER-OUTER QUADRANT OF RIGHT FEMALE BREAST (HCC): ICD-10-CM

## 2021-05-10 PROCEDURE — A9577 INJ MULTIHANCE: HCPCS | Performed by: SURGERY

## 2021-05-10 PROCEDURE — 0 GADOBENATE DIMEGLUMINE 529 MG/ML SOLUTION: Performed by: SURGERY

## 2021-05-10 PROCEDURE — 77049 MRI BREAST C-+ W/CAD BI: CPT

## 2021-05-10 PROCEDURE — 77049 MRI BREAST C-+ W/CAD BI: CPT | Performed by: RADIOLOGY

## 2021-05-10 RX ADMIN — GADOBENATE DIMEGLUMINE 19 ML: 529 INJECTION, SOLUTION INTRAVENOUS at 15:52

## 2021-05-11 ENCOUNTER — TELEPHONE (OUTPATIENT)
Dept: GENETICS | Facility: HOSPITAL | Age: 41
End: 2021-05-11

## 2021-05-11 ENCOUNTER — DOCUMENTATION (OUTPATIENT)
Dept: GENETICS | Facility: HOSPITAL | Age: 41
End: 2021-05-11

## 2021-05-11 NOTE — TELEPHONE ENCOUNTER
I called Ms. Barnes to discuss her genetic test results. The high/moderate breast cancer risk portion of the panel was resulted out first to expedite surgical decision making, and testing was negative for mutations in BRCA1/2, OSIEL, CHEK2, CDH1, PALB2, PTEN, and TP53.  The remainder of the CancerNext panel, including lower risk genes, non-breast cancer related genes, and RNA analysis is still pending, and the patient will be contacted once those results are available. Full summary note, pedigree, and results will be sent to patient and referring provider once full panel results are back.

## 2021-05-12 ENCOUNTER — NURSE NAVIGATOR (OUTPATIENT)
Dept: OTHER | Facility: HOSPITAL | Age: 41
End: 2021-05-12

## 2021-05-12 ENCOUNTER — TELEPHONE (OUTPATIENT)
Dept: MRI IMAGING | Facility: HOSPITAL | Age: 41
End: 2021-05-12

## 2021-05-12 NOTE — PROGRESS NOTES
MD will be notified that patient's breast MRI was good and she is ready to schedule. She would like to have a lumpectomy. All questions were answered and she will call with any new concerns.

## 2021-05-12 NOTE — TELEPHONE ENCOUNTER
Called patient with MRI Breast results. Recommended surgical follow up. Pt was then transferred to the HELP line after questions were asked and answered. An email was sent to the Nurse Navigator. Pt expressed understanding and was encouraged to call with any further questions or concerns.

## 2021-05-13 ENCOUNTER — TELEMEDICINE (OUTPATIENT)
Dept: PSYCHIATRY | Facility: CLINIC | Age: 41
End: 2021-05-13

## 2021-05-13 DIAGNOSIS — G47.9 SLEEP DISTURBANCE: ICD-10-CM

## 2021-05-13 DIAGNOSIS — F43.23 ADJUSTMENT DISORDER WITH MIXED ANXIETY AND DEPRESSED MOOD: Primary | ICD-10-CM

## 2021-05-13 PROCEDURE — 90792 PSYCH DIAG EVAL W/MED SRVCS: CPT | Performed by: NURSE PRACTITIONER

## 2021-05-13 RX ORDER — CLONAZEPAM 0.5 MG/1
TABLET ORAL
Qty: 30 TABLET | Refills: 0 | Status: SHIPPED | OUTPATIENT
Start: 2021-05-13 | End: 2021-06-28 | Stop reason: SDUPTHER
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
## 2021-05-14 ENCOUNTER — DOCUMENTATION (OUTPATIENT)
Dept: GENETICS | Facility: HOSPITAL | Age: 41
End: 2021-05-14

## 2021-05-14 ENCOUNTER — TELEPHONE (OUTPATIENT)
Dept: INTERNAL MEDICINE | Facility: CLINIC | Age: 41
End: 2021-05-14

## 2021-05-14 NOTE — PROGRESS NOTES
Alena Barnes is a 41-year-old female who was seen for genetic counseling due to a personal and family history of breast cancer.  Genetic counseling was provided via telehealth.  Ms. Barnes was diagnosed with an ER/GA+ breast cancer at age 41.  Ms. Barnes is premenopausal and retains her uterus and ovaries.  Ms. Barnes was interested in discussing her risk for a hereditary cancer syndrome, and decided to pursue genetic testing.   Ms. Barnes opted to pursue comprehensive testing via the CancerNext panel ordered through [employer] which includes BRCA1/2 and 34 additional genes associated with an increased risk of breast cancer or other cancers (APC, OSIEL, AXIN2, BARD1, BMPR1A, BRCA1, BRCA2, BRIP1, CDH1, CDK4, CDKN2A, CHEK2, DICER1, EPCAM, GREM1, HOXB13, MLH1, MRE11A, MSH2, MSH3, MSH6, MUTYH, NBN, NF1, NTHL1, PALB2, PMS2, POLD1, POLE, PTEN, RAD51C, RAD51D, RECQL, SMAD4, SMARCA4, STK11, and TP53). Genetic testing was negative by DNA sequencing and rearrangement testing for deleterious mutations in the BRCA1/2 genes and 34 additional genes included on the CancerNext Panel (see attached results). These normal results were discussed with Ms. Barnes by telephone on 5/14/21.     PERTINENT FAMILY HISTORY:  Mother:  Stomach cancer, 54    RISK ASSESSMENT:  Ms. Barnes’s personal history of early onset breast cancer raises the question of a hereditary cancer syndrome.   NCCN guidelines recommend BRCA1/2 testing for individuals diagnosed with breast cancer at or under age 45, regardless of family history.  Therefore, testing is appropriate for Ms. Barnes.  We discussed that standard approach to BRCA1/2 testing is via a multigene panel that evaluates BRCA1/2 and multiple other genes known to impact cancer risk.  This risk assessment is based on the family history information provided at the time of the appointment.  The assessment could change in the future should new information be obtained.     GENETIC COUNSELING: We reviewed  the family history information in detail.  Cases of breast cancer follow three general patterns: sporadic, familial, and hereditary.  While most cancer is sporadic, some cases appear to occur in family clusters.  These cases are said to be familial and account for 10-20% of breast cancer cases.  Familial cases may be due to a combination of shared genes and environmental factors among family members.  In even fewer cases (5-10%), the risk for cancer is inherited, and the genes responsible for the increased cancer risk are known.       Family histories typical of hereditary cancer syndromes usually include multiple first- and second-degree relatives diagnosed with cancer types that define a syndrome.  These cases tend to be diagnosed at younger-than-expected ages and can be bilateral or multifocal.  The cancer in these families follows an autosomal dominant inheritance pattern, which indicates the likely presence of a mutation in a cancer susceptibility gene.  Children and siblings of an individual known to carry a mutation have a 50% chance of inheriting that mutation, thereby inheriting the increased risk to develop cancer.  These mutations can be passed down from the maternal or the paternal lineage.     Hereditary breast cancer accounts for 5-10% of all cases of breast cancer.  A significant proportion (50%) of hereditary breast cancer can be attributed to mutations in the BRCA1 and BRCA2 genes.  Mutations in these genes confer an increased risk for breast cancer, ovarian cancer, male breast cancer, prostate cancer and pancreatic cancer.  There are other genes that are known to be associated with an increased risk for breast cancer and other cancers. In order to get as much information as possible regarding Ms. Barnes’s personal risks and potential risks for her family, testing was pursued through a multigene panel that would look at several other genes known to increase the risk for breast cancer and other  cancers.     GENETIC TESTING:  The risks, benefits and limitations of genetic testing and implications for clinical management following testing were reviewed.  DNA test results can influence decisions regarding screening, prevention and surgical management.  Genetic testing can have significant psychological implications for both individuals and families.  Also discussed was the possibility of insurance discrimination based on genetic test results and the law (ADONAY) in place to prevent this.     We discussed panel testing, which would involve testing for BRCA1/2 as well as several other cancer susceptibility genes at the same time.  The benefits and limitations of genetic testing were discussed and Ms. Barnes decided to pursue testing. The implications of a positive or negative test result were discussed. We discussed the possibility that, in some cases, genetic test results may be uninformative due to the identification of a genetic variant of uncertain significance. These variants may or may not be associated with an increased cancer risk.  VUSs are frequently reported through multigene panel testing, given the presence of genetic variation in the population and the number of genes being analyzed. Given her personal history, a negative test result would not eliminate all breast cancer risk to her relatives, although the risk would not be as high as it would with positive genetic testing.       TEST RESULTS:  Genetic testing was negative by sequencing, deletion/duplication testing for mutations in BRCA1/2 and the additional 34 genes on the CancerNext panel.  The negative result greatly lowers the risk of a hereditary cancer syndrome.  Ms. Barnes’s unaffected female relatives may still be considered to have a somewhat increased risk for breast cancer based on the family history.  This assessment is based on the information provided at the time of the consultation.    Cancer Prevention:  Despite the negative genetic  test results, Ms. Barnes’s female relatives may have a somewhat increased lifetime risk for breast cancer based on family history.  Female relatives could have a risk assessment performed using a family history-based model, such as the Tyrer-Cuzick model, to determine their individual risks.   Surveillance for individuals with an elevated lifetime risk of breast cancer (>20%, versus the average risk of 12%), based on NCCN guidelines, would consist of semi-annual clinical breast exams and monthly self-breast exams starting by age 18 and annual mammography starting 10 years younger than the earliest diagnosis in a close relative, or by age 40.  According to an American Cancer Society expert panel and NCCN guidelines, annual breast MRI should be offered to women whose lifetime risk of breast cancer is 20-25 percent or more, also starting 10 years before the earliest diagnosis or by age 40.      PLAN:  Genetic counseling remains available for Ms. Barnes.  If Ms. Barnes has any questions or concerns she is welcome to call us at [phone].     Sadaf Sorto MS, [facility], [facility]  Licensed Certified Genetic Counselor    Cc: MD Alena Carbajal

## 2021-05-18 ENCOUNTER — TRANSCRIBE ORDERS (OUTPATIENT)
Dept: MAMMOGRAPHY | Facility: HOSPITAL | Age: 41
End: 2021-05-18

## 2021-05-18 DIAGNOSIS — C50.411 MALIGNANT NEOPLASM OF UPPER-OUTER QUADRANT OF RIGHT FEMALE BREAST, UNSPECIFIED ESTROGEN RECEPTOR STATUS (HCC): Primary | ICD-10-CM

## 2021-05-21 ENCOUNTER — NURSE NAVIGATOR (OUTPATIENT)
Dept: OTHER | Facility: HOSPITAL | Age: 41
End: 2021-05-21

## 2021-05-21 NOTE — PROGRESS NOTES
Returned patient's call regarding upcoming surgical procedure. All questions were answered and she will call with nay new concerns.

## 2021-05-25 ENCOUNTER — APPOINTMENT (OUTPATIENT)
Dept: PREADMISSION TESTING | Facility: HOSPITAL | Age: 41
End: 2021-05-25

## 2021-05-25 LAB — SARS-COV-2 RNA PNL SPEC NAA+PROBE: NOT DETECTED

## 2021-05-25 PROCEDURE — U0004 COV-19 TEST NON-CDC HGH THRU: HCPCS

## 2021-05-25 PROCEDURE — C9803 HOPD COVID-19 SPEC COLLECT: HCPCS

## 2021-05-28 ENCOUNTER — HOSPITAL ENCOUNTER (OUTPATIENT)
Dept: MAMMOGRAPHY | Facility: HOSPITAL | Age: 41
Discharge: HOME | End: 2021-05-28

## 2021-05-28 ENCOUNTER — TRANSCRIBE ORDERS (OUTPATIENT)
Dept: ADMINISTRATIVE | Facility: HOSPITAL | Age: 41
End: 2021-05-28

## 2021-05-28 ENCOUNTER — LAB REQUISITION (OUTPATIENT)
Dept: LAB | Facility: HOSPITAL | Age: 41
End: 2021-05-28

## 2021-05-28 ENCOUNTER — HOSPITAL ENCOUNTER (OUTPATIENT)
Dept: NUCLEAR MEDICINE | Facility: HOSPITAL | Age: 41
Discharge: HOME | End: 2021-05-28

## 2021-05-28 ENCOUNTER — HOSPITAL ENCOUNTER (OUTPATIENT)
Dept: ULTRASOUND IMAGING | Facility: HOSPITAL | Age: 41
Discharge: HOME | End: 2021-05-28

## 2021-05-28 DIAGNOSIS — C50.411 MALIGNANT NEOPLASM OF UPPER-OUTER QUADRANT OF RIGHT FEMALE BREAST, UNSPECIFIED ESTROGEN RECEPTOR STATUS (HCC): ICD-10-CM

## 2021-05-28 DIAGNOSIS — C50.411 MALIGNANT NEOPLASM OF UPPER-OUTER QUADRANT OF RIGHT FEMALE BREAST (HCC): ICD-10-CM

## 2021-05-28 DIAGNOSIS — C50.411 MALIGNANT NEOPLASM OF UPPER-OUTER QUADRANT OF RIGHT FEMALE BREAST, UNSPECIFIED ESTROGEN RECEPTOR STATUS (HCC): Primary | ICD-10-CM

## 2021-05-28 PROCEDURE — 88360 TUMOR IMMUNOHISTOCHEM/MANUAL: CPT | Performed by: SURGERY

## 2021-05-28 PROCEDURE — 19285 PERQ DEV BREAST 1ST US IMAG: CPT | Performed by: RADIOLOGY

## 2021-05-28 PROCEDURE — 76098 X-RAY EXAM SURGICAL SPECIMEN: CPT

## 2021-05-28 PROCEDURE — C1819 TISSUE LOCALIZATION-EXCISION: HCPCS

## 2021-05-28 PROCEDURE — 88307 TISSUE EXAM BY PATHOLOGIST: CPT | Performed by: SURGERY

## 2021-05-28 PROCEDURE — 88313 SPECIAL STAINS GROUP 2: CPT | Performed by: SURGERY

## 2021-05-28 PROCEDURE — 77065 DX MAMMO INCL CAD UNI: CPT | Performed by: RADIOLOGY

## 2021-05-28 PROCEDURE — 25010000003 LIDOCAINE 1 % SOLUTION: Performed by: RADIOLOGY

## 2021-05-28 PROCEDURE — 0 TECHNETIUM FILTERED SULFUR COLLOID: Performed by: SURGERY

## 2021-05-28 PROCEDURE — 38792 RA TRACER ID OF SENTINL NODE: CPT

## 2021-05-28 PROCEDURE — 76098 X-RAY EXAM SURGICAL SPECIMEN: CPT | Performed by: RADIOLOGY

## 2021-05-28 PROCEDURE — 88342 IMHCHEM/IMCYTCHM 1ST ANTB: CPT | Performed by: SURGERY

## 2021-05-28 PROCEDURE — 88341 IMHCHEM/IMCYTCHM EA ADD ANTB: CPT | Performed by: SURGERY

## 2021-05-28 PROCEDURE — 88321 CONSLTJ&REPRT SLD PREP ELSWR: CPT

## 2021-05-28 PROCEDURE — A9541 TC99M SULFUR COLLOID: HCPCS | Performed by: SURGERY

## 2021-05-28 RX ORDER — LIDOCAINE HYDROCHLORIDE 10 MG/ML
5 INJECTION, SOLUTION INFILTRATION; PERINEURAL ONCE
Status: COMPLETED | OUTPATIENT
Start: 2021-05-28 | End: 2021-05-28

## 2021-05-28 RX ADMIN — LIDOCAINE HYDROCHLORIDE 2 ML: 10 INJECTION, SOLUTION INFILTRATION; PERINEURAL at 10:34

## 2021-05-28 RX ADMIN — TECHNETIUM TC 99M SULFUR COLLOID 1 DOSE: KIT at 15:05

## 2021-06-02 ENCOUNTER — HOSPITAL ENCOUNTER (OUTPATIENT)
Dept: RADIATION ONCOLOGY | Facility: HOSPITAL | Age: 41
Setting detail: RADIATION/ONCOLOGY SERIES
Discharge: HOME | End: 2021-06-02

## 2021-06-02 ENCOUNTER — OFFICE VISIT (OUTPATIENT)
Dept: RADIATION ONCOLOGY | Facility: HOSPITAL | Age: 41
End: 2021-06-02

## 2021-06-02 ENCOUNTER — CONSULT (OUTPATIENT)
Dept: ONCOLOGY | Facility: CLINIC | Age: 41
End: 2021-06-02

## 2021-06-02 VITALS
WEIGHT: 267 LBS | HEART RATE: 87 BPM | HEIGHT: 67 IN | SYSTOLIC BLOOD PRESSURE: 145 MMHG | RESPIRATION RATE: 18 BRPM | DIASTOLIC BLOOD PRESSURE: 77 MMHG | OXYGEN SATURATION: 96 % | BODY MASS INDEX: 41.91 KG/M2 | TEMPERATURE: 97.3 F

## 2021-06-02 VITALS
RESPIRATION RATE: 18 BRPM | TEMPERATURE: 97.3 F | HEART RATE: 77 BPM | SYSTOLIC BLOOD PRESSURE: 160 MMHG | DIASTOLIC BLOOD PRESSURE: 90 MMHG | WEIGHT: 267 LBS | HEIGHT: 67 IN | BODY MASS INDEX: 41.91 KG/M2

## 2021-06-02 DIAGNOSIS — C50.411 MALIGNANT NEOPLASM OF UPPER-OUTER QUADRANT OF RIGHT BREAST IN FEMALE, ESTROGEN RECEPTOR POSITIVE (HCC): Primary | ICD-10-CM

## 2021-06-02 DIAGNOSIS — Z17.0 MALIGNANT NEOPLASM OF UPPER-OUTER QUADRANT OF RIGHT BREAST IN FEMALE, ESTROGEN RECEPTOR POSITIVE (HCC): Primary | ICD-10-CM

## 2021-06-02 PROCEDURE — 99205 OFFICE O/P NEW HI 60 MIN: CPT | Performed by: INTERNAL MEDICINE

## 2021-06-02 PROCEDURE — G0463 HOSPITAL OUTPT CLINIC VISIT: HCPCS

## 2021-06-03 ENCOUNTER — TELEPHONE (OUTPATIENT)
Dept: SOCIAL WORK | Facility: HOSPITAL | Age: 41
End: 2021-06-03

## 2021-06-03 NOTE — TELEPHONE ENCOUNTER
SW called pt to provide support and resources.  LVM requesting a call back.  SW available for ongoing support and resource needs.

## 2021-06-18 LAB
CYTO UR: NORMAL
LAB AP CASE REPORT: NORMAL
LAB AP CLINICAL INFORMATION: NORMAL
LAB AP DIAGNOSIS COMMENT: NORMAL
LAB AP GENOMIC HEALTH, ADDENDUM: NORMAL
PATH REPORT.ADDENDUM SPEC: NORMAL
PATH REPORT.FINAL DX SPEC: NORMAL
PATH REPORT.GROSS SPEC: NORMAL

## 2021-06-22 ENCOUNTER — TELEMEDICINE (OUTPATIENT)
Dept: ONCOLOGY | Facility: CLINIC | Age: 41
End: 2021-06-22

## 2021-06-22 DIAGNOSIS — Z17.0 MALIGNANT NEOPLASM OF UPPER-OUTER QUADRANT OF RIGHT BREAST IN FEMALE, ESTROGEN RECEPTOR POSITIVE (HCC): Primary | ICD-10-CM

## 2021-06-22 DIAGNOSIS — C50.411 MALIGNANT NEOPLASM OF UPPER-OUTER QUADRANT OF RIGHT BREAST IN FEMALE, ESTROGEN RECEPTOR POSITIVE (HCC): Primary | ICD-10-CM

## 2021-06-22 PROCEDURE — 99214 OFFICE O/P EST MOD 30 MIN: CPT | Performed by: INTERNAL MEDICINE

## 2021-06-22 NOTE — PROGRESS NOTES
Visit conducted via video on zoom.        PROBLEM LIST:  1. [id number] (stage IA) ER+ NC+ Her2 negative invasive ductal carcinoma of the right breast  A) right lumpectomy on 5/28/21. 14 mm IDC, margins clear, node negative.   oncotype score 25.  2. Endometriosis.    Subjective     CHIEF COMPLAINT: breast cancer    HISTORY OF PRESENT ILLNESS:   Alena Barnes returns for follow-up.   She presents to review her oncotype results.      Objective      There were no vitals taken for this visit.     Performance Status:              General: well appearing female in no acute distress  Neuro: alert and oriented  HEENT: sclera anicteric, oropharynx clear  Lymphatics: No masses by inspection  Lungs: Respiratory effort appears normal  Extremeties: no edema by inspection  Skin: no rashes, lesions, bruising, or petechiae  Psych: mood and affect appropriate        RECENT LABS:  Lab Results   Component Value Date    WBC 5.63 01/30/2020    HGB 13.3 01/30/2020    HCT 39.2 01/30/2020    MCV 88.9 01/30/2020     01/30/2020       Lab Results   Component Value Date    GLUCOSE 90 01/30/2020    BUN 9 01/30/2020    CREATININE 0.62 01/30/2020    EGFRIFNONA 106 01/30/2020    BCR 14.5 01/30/2020    K 4.2 01/30/2020    CO2 22.6 01/30/2020    CALCIUM 9.6 01/30/2020    ALBUMIN 4.70 01/30/2020    AST 20 01/30/2020    ALT 21 01/30/2020                 Assessment/Plan   Alena Barnes is a 41 y.o. female with stage IA ER + Her2 negative IDC of the right breast.    Her oncotype score is 25 which is associated with intermediate risk for cancer recurrence.  She would be a candidate for adjuvant chemotherapy with taxotere and cytoxan.  We discussed the side effects of this regimen including alopecia, nausea, fatigue, myelosuppression, infusion reaction, neuropathy, and diarrhea.    Per her oncotype report this would be associated with an approximately 6% risk reduction.  She feels certain that she does not want to proceed with  chemotherapy.  She can go ahead and call Dr. Best's office to scheduled radiotherapy.    I told her I would recommend endocrine therapy.  She may not be a good candidate for tamoxifen due to severe endometriosis, but ovarian suppression plus an aromatase inhibitor would be an option. She says she is not sure she is ready for menopause.  She wants to discuss with her family.    I will schedule f/u after radiation treatment and we will make a final decision on hormonal therapy at that time.                    Linda Anderson MD  [facility]    6/21/2021          CC:

## 2021-06-28 ENCOUNTER — TELEPHONE (OUTPATIENT)
Dept: ONCOLOGY | Facility: CLINIC | Age: 41
End: 2021-06-28

## 2021-06-28 DIAGNOSIS — F41.9 ANXIETY DISORDER, UNSPECIFIED TYPE: Primary | ICD-10-CM

## 2021-06-28 RX ORDER — CLONAZEPAM 0.5 MG/1
TABLET ORAL
Qty: 30 TABLET | Refills: 0 | Status: SHIPPED | OUTPATIENT
Start: 2021-06-28 | End: 2021-09-14

## 2021-06-28 NOTE — TELEPHONE ENCOUNTER
ced reviewed and chart no red flags, pt will be starting radiation therapy for IDC breast s/p lumpectomy.

## 2021-07-01 ENCOUNTER — TELEPHONE (OUTPATIENT)
Dept: INTERNAL MEDICINE | Facility: CLINIC | Age: 41
End: 2021-07-01

## 2021-07-01 ENCOUNTER — TELEPHONE (OUTPATIENT)
Dept: RADIATION ONCOLOGY | Facility: HOSPITAL | Age: 41
End: 2021-07-01

## 2021-07-01 NOTE — TELEPHONE ENCOUNTER
Pt called to schedule radiation sim however pt can only come on Mondays and Tuesdays in the afternoons, appointment given for 7/19, pt verbalized understanding.

## 2021-07-01 NOTE — TELEPHONE ENCOUNTER
Caller: BarnesAlena    Relationship: Self    Best call back number: [phone]    What is the best time to reach you: ANYTIME    Who are you requesting to speak with (clinical staff, provider,  specific staff member): CLINICAL STAFF OR PROVIDER    What was the call regarding: PATIENT WOULD LIKE TO KNOW IF SHE NEEDS TO BE SEEN EVERY 3 MONTHS OR 6 FOR MEDICATION REFILL    Do you require a callback: YES

## 2021-07-07 ENCOUNTER — PRIOR AUTHORIZATION (OUTPATIENT)
Dept: INTERNAL MEDICINE | Facility: CLINIC | Age: 41
End: 2021-07-07

## 2021-07-08 NOTE — TELEPHONE ENCOUNTER
Prior Authorization for Tramadol HCL 50mg Tablets  completed and sent to plan via Covermymeds. Status pending;   KEY:[id number]

## 2021-07-12 ENCOUNTER — OFFICE VISIT (OUTPATIENT)
Dept: PSYCHIATRY | Facility: CLINIC | Age: 41
End: 2021-07-12

## 2021-07-12 DIAGNOSIS — F43.23 ADJUSTMENT DISORDER WITH MIXED ANXIETY AND DEPRESSED MOOD: Primary | ICD-10-CM

## 2021-07-12 DIAGNOSIS — G47.9 SLEEP DISTURBANCE: ICD-10-CM

## 2021-07-12 PROCEDURE — 99442 PR PHYS/QHP TELEPHONE EVALUATION 11-20 MIN: CPT | Performed by: NURSE PRACTITIONER

## 2021-07-12 NOTE — PROGRESS NOTES
Subjective   April Nickie Barnes is a 41 y.o. female who is here today for medication management follow up. You have chosen to receive care through a telephone visit. Do you consent to use a telephone visit for your medical care today? Yes    TIME IN: 2:35  TIME OUT: 2:55    Chief Complaint: Adjustment disorder with anxiety and depressive symptoms, sleep disturbance    History of Present Illness Patient presents via telephone reporting that she is sleeping well.  She takes trazodone as needed for sleeplessness.  Patient reports anxiety about a 4-6 and planning radiation treatments to her breast beginning in August.  She started working night shift waitressing.  Patient takes clonazepam as needed for high anxiety reporting efficacy.  Patient denies depression since surgery is over and will not be getting chemotherapy.  She reports not looking forward to radiation treatments but understands the rationale for it.  Patient reports situational stressors of plan to divorce but still living with spouse.  Patient reports her 7-year-old is doing well.  Denies adverse effects from medications.   (Scales based on 0 - 10 with 10 being the worst)        The following portions of the patient's history were reviewed and updated as appropriate: allergies, current medications, past family history, past medical history, past social history, past surgical history and problem list.    Review of Systems  A 14 point review of systems was performed and is negative except as noted above.    Objective   Physical Exam  not currently breastfeeding.    Allergies   Allergen Reactions   • Penicillins        Current Medications:   Current Outpatient Medications   Medication Sig Dispense Refill   • clonazePAM (KlonoPIN) 0.5 MG tablet Take one tablet as needed once a day for severe anxiety 30 tablet 0   • cyclobenzaprine (FLEXERIL) 5 MG tablet TAKE ONE TABLET BY MOUTH TWICE A DAY AS NEEDED FOR MUSCLE SPASMS 60 tablet 5   • ibuprofen  (ADVIL,MOTRIN) 800 MG tablet TAKE ONE TABLET BY MOUTH EVERY 6 HOURS AS NEEDED FOR MILD PAIN 90 tablet 4   • traMADol (ULTRAM) 50 MG tablet Take 1 tablet by mouth 3 (Three) Times a Day As Needed for Moderate Pain . moderate pain 90 tablet 2   • traZODone (DESYREL) 50 MG tablet Take 1-2 tablets one hour before bedtime as needed for sleep. 45 tablet 2     No current facility-administered medications for this visit.       Lab Results:  Lab Requisition on 05/28/2021   Component Date Value Ref Range Status   • Addendum 05/28/2021    Final                    Value:This result contains rich text formatting which cannot be displayed here.   • Case Report 05/28/2021    Final                    Value:Surgical Pathology Report                         Case: [id number]                                  Authorizing Provider:  Honorio Noguera MD   Collected:           05/28/2021 03:52 PM          Ordering Location:     [facility]   Received:            05/28/2021 03:55 PM                                 LABORATORY                                                                   Pathologist:           Jen Mays MD                                                        Specimens:   1) - Breast, Right                                                                                  2) - Axilla, Right                                                                                  3) -                                                                                      • [employer], Addendum 05/28/2021    Final                    Value:This result contains rich text formatting which cannot be displayed here.   • Clinical Information 05/28/2021    Final                    Value:This result contains rich text formatting which cannot be displayed here.   • Final Diagnosis 05/28/2021    Final                    Value:This result contains rich text formatting which cannot be displayed here.   • Comment  05/28/2021    Final                    Value:This result contains rich text formatting which cannot be displayed here.   • Gross Description 05/28/2021    Final                    Value:This result contains rich text formatting which cannot be displayed here.   • Microscopic Description 05/28/2021    Final                    Value:This result contains rich text formatting which cannot be displayed here.   Appointment on 05/25/2021   Component Date Value Ref Range Status   • COVID19 05/25/2021 Not Detected  Not Detected - Ref. Range Final   Hospital Outpatient Visit on 04/14/2021   Component Date Value Ref Range Status   • Case Report 04/14/2021    Final                    Value:Surgical Pathology Report                         Case: [id number]                                  Authorizing Provider:  Shireen Bower MD           Collected:           04/14/2021 04:40 PM          Ordering Location:     [facility]   Received:            04/15/2021 08:18 AM                                 [facility] [id number]                                                                                  ULTRASOUND                                                                   Pathologist:           Kenya Singer MD                                                   Specimen:    Breast, Right, right breast mass at 10:00                                                 • Clinical Information 04/14/2021    Final                    Value:This result contains rich text formatting which cannot be displayed here.   • Final Diagnosis 04/14/2021    Final                    Value:This result contains rich text formatting which cannot be displayed here.   • Comment 04/14/2021    Final                    Value:This result contains rich text formatting which cannot be displayed here.   • Gross Description 04/14/2021    Final                    Value:This result contains rich text formatting which cannot be displayed here.   •  Special Stains 04/14/2021    Final                    Value:This result contains rich text formatting which cannot be displayed here.   • Microscopic Description 04/14/2021    Final                    Value:This result contains rich text formatting which cannot be displayed here.   Office Visit on 04/06/2021   Component Date Value Ref Range Status   • HCG, Urine, QL 04/06/2021 Negative  Negative Final   • Lot Number 04/06/2021 [id number]   Final   • Internal Positive Control 04/06/2021 Passed   Final   • Internal Negative Control 04/06/2021 Passed   Final     Appearance: NA  Hygiene:  REPORTS good  Cooperation:  Cooperative  Eye Contact: NA  Psychomotor Behavior:  denies psychomotor agitation/retardation, No EPS, No motor tics  Mood:  within normal limits  Affect: NA  Hopelessness: Denies  Speech:  Normal  Thought Process:  Linear  Thought Content:  Normal  Concentration: Normal   Suicidal: denies  Homicidal:  None  Hallucinations:  None  Delusion:  None  Memory:  Intact  Orientation:  Person, Place, Time and Situation  Reliability:  good  Insight:  Fair  Judgement: good  Impulse Control: good  Estimated Intelligence: average range    JACQUELIN REVIEWED NO RED FLAGS 7/12/2021    Assessment/Plan   Diagnoses and all orders for this visit:    1. Adjustment disorder with mixed anxiety and depressed mood (Primary)    2. Sleep disturbance          IMPRESSION: Mood stabilizing along with sleep    PLAN: We will continue clonazepam through cancer treatment and then plan to discontinue to only utilize for severe anxiety  Continue trazodone 50 mg 1 p.o. nightly as needed for sleeplessness  Reviewed coping skills with relaxation techniques    We discussed risks, benefits, and side effects of the above medications and the patient was agreeable with the plan. Patient was educated on the importance of compliance with treatment and follow-up appointments.       Assisted patient in identifying risk factors which would indicate the need  for higher level of care including thoughts to harm self or others and/or self-harming behavior and encouraged patient to contact this office, call 911, or present to the nearest emergency room should any of these events occur. Discussed crisis intervention services and means to access.  Patient adamantly and convincingly denies current suicidal or homicidal ideation or perceptual disturbance.    Treatment Plan: stabilize mood, patient will stay out of psychiatric hospital and be at optimal level of functioning with therapy and take all medication as prescribed. Patient verbalized  understanding and agreement to plan.    Instructed to call for questions or concerns and return early if necessary.     Greater than 50% time was spent in coordination of care, and counseling the patient regarding current assessment, symptoms, plan of care going forward, supportive therapy.  Answered any questions patient had regarding medications and plan of care.    Return in about 2 months (around 9/14/2021).

## 2021-07-19 ENCOUNTER — HOSPITAL ENCOUNTER (OUTPATIENT)
Dept: RADIATION ONCOLOGY | Facility: HOSPITAL | Age: 41
Setting detail: RADIATION/ONCOLOGY SERIES
Discharge: HOME | End: 2021-07-19

## 2021-08-09 DIAGNOSIS — N80.9 ENDOMETRIOSIS: ICD-10-CM

## 2021-08-09 DIAGNOSIS — M51.36 DEGENERATION OF INTERVERTEBRAL DISC OF LUMBAR REGION: ICD-10-CM

## 2021-08-09 RX ORDER — IBUPROFEN 800 MG/1
TABLET ORAL
Qty: 90 TABLET | Refills: 4 | Status: SHIPPED | OUTPATIENT
Start: 2021-08-09 | End: 2022-02-21 | Stop reason: SDUPTHER

## 2021-08-16 ENCOUNTER — TELEMEDICINE (OUTPATIENT)
Dept: INTERNAL MEDICINE | Facility: CLINIC | Age: 41
End: 2021-08-16

## 2021-08-16 DIAGNOSIS — N80.9 ENDOMETRIOSIS: ICD-10-CM

## 2021-08-16 DIAGNOSIS — C50.411 MALIGNANT NEOPLASM OF UPPER-OUTER QUADRANT OF RIGHT BREAST IN FEMALE, ESTROGEN RECEPTOR POSITIVE (HCC): Primary | ICD-10-CM

## 2021-08-16 DIAGNOSIS — F41.8 SITUATIONAL ANXIETY: ICD-10-CM

## 2021-08-16 DIAGNOSIS — Z17.0 MALIGNANT NEOPLASM OF UPPER-OUTER QUADRANT OF RIGHT BREAST IN FEMALE, ESTROGEN RECEPTOR POSITIVE (HCC): Primary | ICD-10-CM

## 2021-08-16 DIAGNOSIS — G47.09 OTHER INSOMNIA: ICD-10-CM

## 2021-08-16 PROCEDURE — 99214 OFFICE O/P EST MOD 30 MIN: CPT | Performed by: NURSE PRACTITIONER

## 2021-08-16 RX ORDER — TRAMADOL HYDROCHLORIDE 50 MG/1
50 TABLET ORAL 3 TIMES DAILY PRN
Qty: 90 TABLET | Refills: 2 | Status: SHIPPED | OUTPATIENT
Start: 2021-08-16 | End: 2022-02-21 | Stop reason: SDUPTHER

## 2021-08-16 RX ORDER — TRAZODONE HYDROCHLORIDE 100 MG/1
100 TABLET ORAL NIGHTLY
Qty: 30 TABLET | Refills: 5 | Status: SHIPPED | OUTPATIENT
Start: 2021-08-16 | End: 2022-02-21 | Stop reason: SDUPTHER

## 2021-08-20 ENCOUNTER — HOSPITAL ENCOUNTER (OUTPATIENT)
Dept: RADIATION ONCOLOGY | Facility: HOSPITAL | Age: 41
Setting detail: RADIATION/ONCOLOGY SERIES
Discharge: HOME | End: 2021-08-20

## 2021-08-31 ENCOUNTER — TELEPHONE (OUTPATIENT)
Dept: ONCOLOGY | Facility: CLINIC | Age: 41
End: 2021-08-31

## 2021-08-31 NOTE — TELEPHONE ENCOUNTER
Patient had a mychart visit scheduled for today but she has not started radiation yet.  I talked with Dr Anderson and she said that we could reschedule the appt for when patient is towards the end of radiation to discuss endocrine therapy.  I called patient and had to leave a voicemail.

## 2021-09-10 DIAGNOSIS — N80.9 ENDOMETRIOSIS: ICD-10-CM

## 2021-09-10 DIAGNOSIS — M51.36 DEGENERATION OF INTERVERTEBRAL DISC OF LUMBAR REGION: ICD-10-CM

## 2021-09-10 RX ORDER — CYCLOBENZAPRINE HCL 5 MG
TABLET ORAL
Qty: 60 TABLET | Refills: 5 | Status: SHIPPED | OUTPATIENT
Start: 2021-09-10 | End: 2022-02-21 | Stop reason: SDUPTHER

## 2021-09-14 ENCOUNTER — OFFICE VISIT (OUTPATIENT)
Dept: PSYCHIATRY | Facility: CLINIC | Age: 41
End: 2021-09-14

## 2021-09-14 DIAGNOSIS — F43.22 ADJUSTMENT DISORDER WITH ANXIOUS MOOD: Primary | ICD-10-CM

## 2021-09-14 DIAGNOSIS — G47.9 SLEEP DISTURBANCE: ICD-10-CM

## 2021-09-14 PROCEDURE — 99441 PR PHYS/QHP TELEPHONE EVALUATION 5-10 MIN: CPT | Performed by: NURSE PRACTITIONER
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
## 2021-11-11 ENCOUNTER — NURSE NAVIGATOR (OUTPATIENT)
Dept: OTHER | Facility: HOSPITAL | Age: 41
End: 2021-11-11

## 2021-11-11 NOTE — PROGRESS NOTES
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 I called patient to let her know this plan and to again review with her the recommended treatment of XRT and Hormone Blocking agent - she states that she understands but just cannot do the XRT with her pressures and schedule and that she does not want to risk her femininity, quality of life or ability to have children. I told her if she wanted more information or need further help to feel free to call. She verbalized understanding.

## 2021-11-12 ENCOUNTER — TRANSCRIBE ORDERS (OUTPATIENT)
Dept: MAMMOGRAPHY | Facility: HOSPITAL | Age: 41
End: 2021-11-12

## 2021-11-12 DIAGNOSIS — R92.8 ABNORMAL MAMMOGRAM: Primary | ICD-10-CM

## 2021-11-12 DIAGNOSIS — C50.411 MALIGNANT NEOPLASM OF UPPER-OUTER QUADRANT OF RIGHT FEMALE BREAST, UNSPECIFIED ESTROGEN RECEPTOR STATUS (HCC): ICD-10-CM

## 2021-11-29 ENCOUNTER — TELEPHONE (OUTPATIENT)
Dept: INTERNAL MEDICINE | Facility: CLINIC | Age: 41
End: 2021-11-29

## 2021-11-29 NOTE — TELEPHONE ENCOUNTER
I have no idea what type of labs she is supposed to be getting. Can you contact her and find out. thanks

## 2021-11-29 NOTE — TELEPHONE ENCOUNTER
Pt said she was suppose to get blood work done in the office, but she has no lab orders, she said the blood work is so she can get a med refill, please advise.

## 2021-12-03 ENCOUNTER — DOCUMENTATION (OUTPATIENT)
Dept: OCCUPATIONAL THERAPY | Facility: HOSPITAL | Age: 41
End: 2021-12-03

## 2021-12-03 DIAGNOSIS — C50.911 MALIGNANT NEOPLASM OF RIGHT FEMALE BREAST, UNSPECIFIED ESTROGEN RECEPTOR STATUS, UNSPECIFIED SITE OF BREAST (HCC): Primary | ICD-10-CM

## 2021-12-03 NOTE — THERAPY DISCHARGE NOTE
Outpatient Occupational Therapy Discharge Summary         Patient Name: Alena Barnes  : 1980  MRN: [id number]    Today's Date: 12/3/2021      Visit Date: 2021           OP OT Discharge Summary  Date of Discharge: 21  Discharge Instructions: Pt is being d/c from OP OT at this time as she has not been seen by OT since her [facility] pre op eval. I originally evaluated pt in Dr. Watkins clinic preoperatively to obtain baseline measurements and provide education regarding lymphedema, exercise, and other post op sequelae. I would be happy to treat pt if needed with a new order.            Lucie Curiel, OTR/L   12/3/2021

## 2021-12-06 ENCOUNTER — HOSPITAL ENCOUNTER (OUTPATIENT)
Dept: MAMMOGRAPHY | Facility: HOSPITAL | Age: 41
Discharge: HOME | End: 2021-12-06
Admitting: SURGERY

## 2021-12-06 DIAGNOSIS — C50.411 MALIGNANT NEOPLASM OF UPPER-OUTER QUADRANT OF RIGHT FEMALE BREAST, UNSPECIFIED ESTROGEN RECEPTOR STATUS (HCC): ICD-10-CM

## 2021-12-06 PROCEDURE — G0279 TOMOSYNTHESIS, MAMMO: HCPCS

## 2021-12-06 PROCEDURE — 77065 DX MAMMO INCL CAD UNI: CPT | Performed by: RADIOLOGY

## 2021-12-06 PROCEDURE — 77061 BREAST TOMOSYNTHESIS UNI: CPT | Performed by: RADIOLOGY

## 2021-12-06 PROCEDURE — 77065 DX MAMMO INCL CAD UNI: CPT

## 2022-01-26 ENCOUNTER — HOSPITAL ENCOUNTER (EMERGENCY)
Facility: HOSPITAL | Age: 42
Discharge: HOME | End: 2022-01-26
Attending: EMERGENCY MEDICINE | Admitting: EMERGENCY MEDICINE

## 2022-01-26 ENCOUNTER — APPOINTMENT (OUTPATIENT)
Dept: CT IMAGING | Facility: HOSPITAL | Age: 42
End: 2022-01-26

## 2022-01-26 VITALS
SYSTOLIC BLOOD PRESSURE: 145 MMHG | DIASTOLIC BLOOD PRESSURE: 86 MMHG | OXYGEN SATURATION: 97 % | WEIGHT: 180 LBS | RESPIRATION RATE: 18 BRPM | BODY MASS INDEX: 28.25 KG/M2 | TEMPERATURE: 98.1 F | HEIGHT: 67 IN | HEART RATE: 84 BPM

## 2022-01-26 DIAGNOSIS — R11.2 INTRACTABLE VOMITING WITH NAUSEA: ICD-10-CM

## 2022-01-26 DIAGNOSIS — R10.10 PAIN OF UPPER ABDOMEN: ICD-10-CM

## 2022-01-26 DIAGNOSIS — R10.11 RIGHT UPPER QUADRANT ABDOMINAL PAIN: Primary | ICD-10-CM

## 2022-01-26 DIAGNOSIS — K29.00 ACUTE GASTRITIS WITHOUT HEMORRHAGE, UNSPECIFIED GASTRITIS TYPE: ICD-10-CM

## 2022-01-26 LAB
ALBUMIN SERPL-MCNC: 4.7 G/DL (ref 3.5–5.2)
ALBUMIN/GLOB SERPL: 1.6 G/DL
ALP SERPL-CCNC: 113 U/L (ref 39–117)
ALT SERPL W P-5'-P-CCNC: 56 U/L (ref 1–33)
ANION GAP SERPL CALCULATED.3IONS-SCNC: 14 MMOL/L (ref 5–15)
AST SERPL-CCNC: 62 U/L (ref 1–32)
B-HCG UR QL: NEGATIVE
BACTERIA UR QL AUTO: ABNORMAL /HPF
BASOPHILS # BLD AUTO: 0.03 10*3/MM3 (ref 0–0.2)
BASOPHILS NFR BLD AUTO: 0.3 % (ref 0–1.5)
BILIRUB SERPL-MCNC: 1 MG/DL (ref 0–1.2)
BILIRUB UR QL STRIP: NEGATIVE
BUN SERPL-MCNC: 15 MG/DL (ref 6–20)
BUN/CREAT SERPL: 23.4 (ref 7–25)
CALCIUM SPEC-SCNC: 9.9 MG/DL (ref 8.6–10.5)
CHLORIDE SERPL-SCNC: 106 MMOL/L (ref 98–107)
CLARITY UR: ABNORMAL
CO2 SERPL-SCNC: 21 MMOL/L (ref 22–29)
COD CRY URNS QL: ABNORMAL /HPF
COLOR UR: YELLOW
CREAT SERPL-MCNC: 0.64 MG/DL (ref 0.57–1)
DEPRECATED RDW RBC AUTO: 46.2 FL (ref 37–54)
EOSINOPHIL # BLD AUTO: 0 10*3/MM3 (ref 0–0.4)
EOSINOPHIL NFR BLD AUTO: 0 % (ref 0.3–6.2)
ERYTHROCYTE [DISTWIDTH] IN BLOOD BY AUTOMATED COUNT: 15 % (ref 12.3–15.4)
EXPIRATION DATE: NORMAL
FLUAV RNA RESP QL NAA+PROBE: NOT DETECTED
FLUBV RNA RESP QL NAA+PROBE: NOT DETECTED
GFR SERPL CREATININE-BSD FRML MDRD: 101 ML/MIN/1.73
GLOBULIN UR ELPH-MCNC: 3 GM/DL
GLUCOSE SERPL-MCNC: 124 MG/DL (ref 65–99)
GLUCOSE UR STRIP-MCNC: NEGATIVE MG/DL
HCG INTACT+B SERPL-ACNC: <0.1 MIU/ML
HCT VFR BLD AUTO: 39.9 % (ref 34–46.6)
HGB BLD-MCNC: 12.9 G/DL (ref 12–15.9)
HGB UR QL STRIP.AUTO: ABNORMAL
HOLD SPECIMEN: NORMAL
HYALINE CASTS UR QL AUTO: ABNORMAL /LPF
IMM GRANULOCYTES # BLD AUTO: 0.03 10*3/MM3 (ref 0–0.05)
IMM GRANULOCYTES NFR BLD AUTO: 0.3 % (ref 0–0.5)
INTERNAL NEGATIVE CONTROL: NEGATIVE
INTERNAL POSITIVE CONTROL: POSITIVE
KETONES UR QL STRIP: ABNORMAL
LEUKOCYTE ESTERASE UR QL STRIP.AUTO: ABNORMAL
LIPASE SERPL-CCNC: 19 U/L (ref 13–60)
LYMPHOCYTES # BLD AUTO: 0.93 10*3/MM3 (ref 0.7–3.1)
LYMPHOCYTES NFR BLD AUTO: 10.4 % (ref 19.6–45.3)
Lab: NORMAL
MCH RBC QN AUTO: 27.3 PG (ref 26.6–33)
MCHC RBC AUTO-ENTMCNC: 32.3 G/DL (ref 31.5–35.7)
MCV RBC AUTO: 84.4 FL (ref 79–97)
MONOCYTES # BLD AUTO: 0.25 10*3/MM3 (ref 0.1–0.9)
MONOCYTES NFR BLD AUTO: 2.8 % (ref 5–12)
MUCOUS THREADS URNS QL MICRO: ABNORMAL /HPF
NEUTROPHILS NFR BLD AUTO: 7.74 10*3/MM3 (ref 1.7–7)
NEUTROPHILS NFR BLD AUTO: 86.2 % (ref 42.7–76)
NITRITE UR QL STRIP: NEGATIVE
NRBC BLD AUTO-RTO: 0 /100 WBC (ref 0–0.2)
PH UR STRIP.AUTO: 5.5 [PH] (ref 5–8)
PLATELET # BLD AUTO: 322 10*3/MM3 (ref 140–450)
PMV BLD AUTO: 13 FL (ref 6–12)
POTASSIUM SERPL-SCNC: 3.8 MMOL/L (ref 3.5–5.2)
PROT SERPL-MCNC: 7.7 G/DL (ref 6–8.5)
PROT UR QL STRIP: ABNORMAL
RBC # BLD AUTO: 4.73 10*6/MM3 (ref 3.77–5.28)
RBC # UR STRIP: ABNORMAL /HPF
REF LAB TEST METHOD: ABNORMAL
SARS-COV-2 RNA RESP QL NAA+PROBE: NOT DETECTED
SODIUM SERPL-SCNC: 141 MMOL/L (ref 136–145)
SP GR UR STRIP: 1.02 (ref 1–1.03)
SQUAMOUS #/AREA URNS HPF: ABNORMAL /HPF
UROBILINOGEN UR QL STRIP: ABNORMAL
WBC # UR STRIP: ABNORMAL /HPF
WBC NRBC COR # BLD: 8.98 10*3/MM3 (ref 3.4–10.8)
WHOLE BLOOD HOLD SPECIMEN: NORMAL
WHOLE BLOOD HOLD SPECIMEN: NORMAL

## 2022-01-26 PROCEDURE — 96375 TX/PRO/DX INJ NEW DRUG ADDON: CPT

## 2022-01-26 PROCEDURE — 25010000002 IOPAMIDOL 61 % SOLUTION: Performed by: EMERGENCY MEDICINE

## 2022-01-26 PROCEDURE — 83690 ASSAY OF LIPASE: CPT | Performed by: EMERGENCY MEDICINE

## 2022-01-26 PROCEDURE — 99283 EMERGENCY DEPT VISIT LOW MDM: CPT

## 2022-01-26 PROCEDURE — 25010000002 PROCHLORPERAZINE 10 MG/2ML SOLUTION: Performed by: EMERGENCY MEDICINE

## 2022-01-26 PROCEDURE — 85025 COMPLETE CBC W/AUTO DIFF WBC: CPT | Performed by: EMERGENCY MEDICINE

## 2022-01-26 PROCEDURE — 81001 URINALYSIS AUTO W/SCOPE: CPT | Performed by: EMERGENCY MEDICINE

## 2022-01-26 PROCEDURE — 96374 THER/PROPH/DIAG INJ IV PUSH: CPT

## 2022-01-26 PROCEDURE — 84702 CHORIONIC GONADOTROPIN TEST: CPT | Performed by: EMERGENCY MEDICINE

## 2022-01-26 PROCEDURE — 25010000002 ONDANSETRON PER 1 MG: Performed by: EMERGENCY MEDICINE

## 2022-01-26 PROCEDURE — 87636 SARSCOV2 & INF A&B AMP PRB: CPT | Performed by: EMERGENCY MEDICINE

## 2022-01-26 PROCEDURE — 25010000002 LORAZEPAM PER 2 MG: Performed by: EMERGENCY MEDICINE

## 2022-01-26 PROCEDURE — 25010000002 MORPHINE PER 10 MG: Performed by: EMERGENCY MEDICINE

## 2022-01-26 PROCEDURE — 25010000002 PROMETHAZINE PER 50 MG: Performed by: EMERGENCY MEDICINE

## 2022-01-26 PROCEDURE — 81025 URINE PREGNANCY TEST: CPT | Performed by: EMERGENCY MEDICINE

## 2022-01-26 PROCEDURE — 74177 CT ABD & PELVIS W/CONTRAST: CPT

## 2022-01-26 PROCEDURE — 80053 COMPREHEN METABOLIC PANEL: CPT | Performed by: EMERGENCY MEDICINE

## 2022-01-26 RX ORDER — MORPHINE SULFATE 2 MG/ML
2 INJECTION, SOLUTION INTRAMUSCULAR; INTRAVENOUS ONCE
Status: COMPLETED | OUTPATIENT
Start: 2022-01-26 | End: 2022-01-26

## 2022-01-26 RX ORDER — SODIUM CHLORIDE 9 MG/ML
10 INJECTION INTRAVENOUS AS NEEDED
Status: DISCONTINUED | OUTPATIENT
Start: 2022-01-26 | End: 2022-01-26 | Stop reason: HOSPADM

## 2022-01-26 RX ORDER — PROMETHAZINE HYDROCHLORIDE 25 MG/1
25 TABLET ORAL EVERY 6 HOURS PRN
Qty: 30 TABLET | Refills: 0 | Status: SHIPPED | OUTPATIENT
Start: 2022-01-26 | End: 2022-02-21

## 2022-01-26 RX ORDER — ONDANSETRON 2 MG/ML
4 INJECTION INTRAMUSCULAR; INTRAVENOUS ONCE
Status: COMPLETED | OUTPATIENT
Start: 2022-01-26 | End: 2022-01-26

## 2022-01-26 RX ORDER — ONDANSETRON 4 MG/1
4 TABLET, FILM COATED ORAL ONCE
Status: DISCONTINUED | OUTPATIENT
Start: 2022-01-26 | End: 2022-01-26

## 2022-01-26 RX ORDER — SODIUM CHLORIDE 0.9 % (FLUSH) 0.9 %
10 SYRINGE (ML) INJECTION AS NEEDED
Status: DISCONTINUED | OUTPATIENT
Start: 2022-01-26 | End: 2022-01-26 | Stop reason: HOSPADM

## 2022-01-26 RX ORDER — LORAZEPAM 2 MG/ML
0.5 INJECTION INTRAMUSCULAR ONCE
Status: COMPLETED | OUTPATIENT
Start: 2022-01-26 | End: 2022-01-26

## 2022-01-26 RX ORDER — PROCHLORPERAZINE EDISYLATE 5 MG/ML
10 INJECTION INTRAMUSCULAR; INTRAVENOUS ONCE
Status: COMPLETED | OUTPATIENT
Start: 2022-01-26 | End: 2022-01-26

## 2022-01-26 RX ORDER — OMEPRAZOLE 40 MG/1
40 CAPSULE, DELAYED RELEASE ORAL DAILY
Qty: 14 CAPSULE | Refills: 0 | Status: SHIPPED | OUTPATIENT
Start: 2022-01-26 | End: 2022-02-14 | Stop reason: SDUPTHER

## 2022-01-26 RX ADMIN — SODIUM CHLORIDE 1000 ML: 9 INJECTION, SOLUTION INTRAVENOUS at 15:10

## 2022-01-26 RX ADMIN — LORAZEPAM 0.5 MG: 2 INJECTION INTRAMUSCULAR; INTRAVENOUS at 16:05

## 2022-01-26 RX ADMIN — PROCHLORPERAZINE EDISYLATE 10 MG: 5 INJECTION INTRAMUSCULAR; INTRAVENOUS at 16:03

## 2022-01-26 RX ADMIN — PROMETHAZINE HYDROCHLORIDE 12.5 MG: 25 INJECTION INTRAMUSCULAR; INTRAVENOUS at 15:51

## 2022-01-26 RX ADMIN — ONDANSETRON 4 MG: 2 INJECTION INTRAMUSCULAR; INTRAVENOUS at 15:22

## 2022-01-26 RX ADMIN — IOPAMIDOL 100 ML: 612 INJECTION, SOLUTION INTRAVENOUS at 17:46

## 2022-01-26 RX ADMIN — MORPHINE SULFATE 2 MG: 2 INJECTION, SOLUTION INTRAMUSCULAR; INTRAVENOUS at 15:52

## 2022-02-14 ENCOUNTER — TELEPHONE (OUTPATIENT)
Dept: INTERNAL MEDICINE | Facility: CLINIC | Age: 42
End: 2022-02-14

## 2022-02-14 RX ORDER — OMEPRAZOLE 40 MG/1
40 CAPSULE, DELAYED RELEASE ORAL DAILY
Qty: 90 CAPSULE | Refills: 3 | Status: SHIPPED | OUTPATIENT
Start: 2022-02-14 | End: 2022-02-21 | Stop reason: SDUPTHER

## 2022-02-14 NOTE — TELEPHONE ENCOUNTER
PATIENT WAS SEEN AT THE ER AND GIVEN OMEPRAZOLE.  SHE NEEDS A REFILL, NOT ENOUGH TO DO UNTIL APPOINTMENT.     PHARMACY:  [facility]    PLEASE CALL [phone]

## 2022-02-21 ENCOUNTER — LAB (OUTPATIENT)
Dept: LAB | Facility: HOSPITAL | Age: 42
End: 2022-02-21

## 2022-02-21 ENCOUNTER — OFFICE VISIT (OUTPATIENT)
Dept: INTERNAL MEDICINE | Facility: CLINIC | Age: 42
End: 2022-02-21

## 2022-02-21 VITALS
RESPIRATION RATE: 16 BRPM | OXYGEN SATURATION: 98 % | BODY MASS INDEX: 29.82 KG/M2 | SYSTOLIC BLOOD PRESSURE: 140 MMHG | DIASTOLIC BLOOD PRESSURE: 86 MMHG | TEMPERATURE: 98.2 F | HEIGHT: 67 IN | HEART RATE: 68 BPM | WEIGHT: 190 LBS

## 2022-02-21 DIAGNOSIS — N80.9 ENDOMETRIOSIS: ICD-10-CM

## 2022-02-21 DIAGNOSIS — G47.09 OTHER INSOMNIA: ICD-10-CM

## 2022-02-21 DIAGNOSIS — R11.2 NON-INTRACTABLE VOMITING WITH NAUSEA, UNSPECIFIED VOMITING TYPE: ICD-10-CM

## 2022-02-21 DIAGNOSIS — H60.313 ACUTE DIFFUSE OTITIS EXTERNA OF BOTH EARS: ICD-10-CM

## 2022-02-21 DIAGNOSIS — F41.9 ANXIETY: Primary | ICD-10-CM

## 2022-02-21 DIAGNOSIS — K21.9 GASTROESOPHAGEAL REFLUX DISEASE WITHOUT ESOPHAGITIS: ICD-10-CM

## 2022-02-21 DIAGNOSIS — R10.11 RUQ PAIN: ICD-10-CM

## 2022-02-21 DIAGNOSIS — Z13.0 SCREENING FOR BLOOD DISEASE: ICD-10-CM

## 2022-02-21 DIAGNOSIS — Z13.1 SCREENING FOR DIABETES MELLITUS: ICD-10-CM

## 2022-02-21 DIAGNOSIS — Z13.220 LIPID SCREENING: ICD-10-CM

## 2022-02-21 DIAGNOSIS — Z13.29 THYROID DISORDER SCREENING: ICD-10-CM

## 2022-02-21 DIAGNOSIS — Z79.899 MEDICATION MANAGEMENT: ICD-10-CM

## 2022-02-21 DIAGNOSIS — M51.36 DEGENERATION OF INTERVERTEBRAL DISC OF LUMBAR REGION: ICD-10-CM

## 2022-02-21 DIAGNOSIS — E55.9 VITAMIN D DEFICIENCY: ICD-10-CM

## 2022-02-21 DIAGNOSIS — Z13.21 ENCOUNTER FOR VITAMIN DEFICIENCY SCREENING: ICD-10-CM

## 2022-02-21 LAB
25(OH)D3 SERPL-MCNC: 13.3 NG/ML
ALBUMIN SERPL-MCNC: 4.2 G/DL (ref 3.5–5.2)
ALBUMIN/GLOB SERPL: 1.9 G/DL
ALP SERPL-CCNC: 71 U/L (ref 39–117)
ALT SERPL W P-5'-P-CCNC: 18 U/L (ref 1–33)
ANION GAP SERPL CALCULATED.3IONS-SCNC: 9.6 MMOL/L (ref 5–15)
AST SERPL-CCNC: 17 U/L (ref 1–32)
BILIRUB SERPL-MCNC: 0.4 MG/DL (ref 0–1.2)
BUN SERPL-MCNC: 11 MG/DL (ref 6–20)
BUN/CREAT SERPL: 18.3 (ref 7–25)
CALCIUM SPEC-SCNC: 9.4 MG/DL (ref 8.6–10.5)
CHLORIDE SERPL-SCNC: 107 MMOL/L (ref 98–107)
CHOLEST SERPL-MCNC: 151 MG/DL (ref 0–200)
CO2 SERPL-SCNC: 25.4 MMOL/L (ref 22–29)
CREAT SERPL-MCNC: 0.6 MG/DL (ref 0.57–1)
DEPRECATED RDW RBC AUTO: 45.4 FL (ref 37–54)
ERYTHROCYTE [DISTWIDTH] IN BLOOD BY AUTOMATED COUNT: 14.8 % (ref 12.3–15.4)
FOLATE SERPL-MCNC: 10.1 NG/ML (ref 4.78–24.2)
GFR SERPL CREATININE-BSD FRML MDRD: 109 ML/MIN/1.73
GLOBULIN UR ELPH-MCNC: 2.2 GM/DL
GLUCOSE SERPL-MCNC: 91 MG/DL (ref 65–99)
HCT VFR BLD AUTO: 33.1 % (ref 34–46.6)
HDLC SERPL-MCNC: 57 MG/DL (ref 40–60)
HGB BLD-MCNC: 10.6 G/DL (ref 12–15.9)
LDLC SERPL CALC-MCNC: 80 MG/DL (ref 0–100)
LDLC/HDLC SERPL: 1.4 {RATIO}
MCH RBC QN AUTO: 27.5 PG (ref 26.6–33)
MCHC RBC AUTO-ENTMCNC: 32 G/DL (ref 31.5–35.7)
MCV RBC AUTO: 85.8 FL (ref 79–97)
PLATELET # BLD AUTO: 212 10*3/MM3 (ref 140–450)
PMV BLD AUTO: 13 FL (ref 6–12)
POTASSIUM SERPL-SCNC: 3.6 MMOL/L (ref 3.5–5.2)
PROT SERPL-MCNC: 6.4 G/DL (ref 6–8.5)
RBC # BLD AUTO: 3.86 10*6/MM3 (ref 3.77–5.28)
SODIUM SERPL-SCNC: 142 MMOL/L (ref 136–145)
TRIGL SERPL-MCNC: 71 MG/DL (ref 0–150)
TSH SERPL DL<=0.05 MIU/L-ACNC: 2.03 UIU/ML (ref 0.27–4.2)
VIT B12 BLD-MCNC: 381 PG/ML (ref 211–946)
VLDLC SERPL-MCNC: 14 MG/DL (ref 5–40)
WBC NRBC COR # BLD: 5.49 10*3/MM3 (ref 3.4–10.8)

## 2022-02-21 PROCEDURE — 83036 HEMOGLOBIN GLYCOSYLATED A1C: CPT | Performed by: NURSE PRACTITIONER

## 2022-02-21 PROCEDURE — 80050 GENERAL HEALTH PANEL: CPT | Performed by: NURSE PRACTITIONER

## 2022-02-21 PROCEDURE — 82306 VITAMIN D 25 HYDROXY: CPT | Performed by: NURSE PRACTITIONER

## 2022-02-21 PROCEDURE — 80061 LIPID PANEL: CPT | Performed by: NURSE PRACTITIONER

## 2022-02-21 PROCEDURE — 82607 VITAMIN B-12: CPT | Performed by: NURSE PRACTITIONER

## 2022-02-21 PROCEDURE — 99214 OFFICE O/P EST MOD 30 MIN: CPT | Performed by: NURSE PRACTITIONER

## 2022-02-21 PROCEDURE — 82746 ASSAY OF FOLIC ACID SERUM: CPT | Performed by: NURSE PRACTITIONER

## 2022-02-21 RX ORDER — TRAZODONE HYDROCHLORIDE 100 MG/1
100 TABLET ORAL NIGHTLY
Qty: 90 TABLET | Refills: 3 | Status: SHIPPED | OUTPATIENT
Start: 2022-02-21 | End: 2022-05-23 | Stop reason: SDUPTHER

## 2022-02-21 RX ORDER — TRAMADOL HYDROCHLORIDE 50 MG/1
50 TABLET ORAL 3 TIMES DAILY PRN
Qty: 60 TABLET | Refills: 0 | Status: SHIPPED | OUTPATIENT
Start: 2022-02-21 | End: 2022-05-23 | Stop reason: SDUPTHER

## 2022-02-21 RX ORDER — CYCLOBENZAPRINE HCL 5 MG
5 TABLET ORAL 2 TIMES DAILY PRN
Qty: 60 TABLET | Refills: 2 | Status: SHIPPED | OUTPATIENT
Start: 2022-02-21 | End: 2022-05-23 | Stop reason: SDUPTHER

## 2022-02-21 RX ORDER — ONDANSETRON 8 MG/1
TABLET, ORALLY DISINTEGRATING ORAL
Qty: 30 TABLET | Refills: 1 | Status: SHIPPED | OUTPATIENT
Start: 2022-02-21 | End: 2022-05-23 | Stop reason: SDUPTHER

## 2022-02-21 RX ORDER — CLONAZEPAM 0.5 MG/1
0.5 TABLET ORAL 2 TIMES DAILY PRN
Qty: 30 TABLET | Refills: 0 | Status: SHIPPED | OUTPATIENT
Start: 2022-02-21 | End: 2022-05-23 | Stop reason: SDUPTHER

## 2022-02-21 RX ORDER — OMEPRAZOLE 40 MG/1
40 CAPSULE, DELAYED RELEASE ORAL DAILY
Qty: 90 CAPSULE | Refills: 3 | Status: SHIPPED | OUTPATIENT
Start: 2022-02-21 | End: 2022-05-23 | Stop reason: SDUPTHER

## 2022-02-21 RX ORDER — IBUPROFEN 800 MG/1
800 TABLET ORAL EVERY 6 HOURS PRN
Qty: 60 TABLET | Refills: 5 | Status: SHIPPED | OUTPATIENT
Start: 2022-02-21 | End: 2022-05-23 | Stop reason: SDUPTHER

## 2022-02-22 LAB — HBA1C MFR BLD: 5.3 % (ref 4.8–5.6)

## 2022-02-24 RX ORDER — ERGOCALCIFEROL 1.25 MG/1
50000 CAPSULE ORAL WEEKLY
Qty: 4 CAPSULE | Refills: 2 | Status: SHIPPED | OUTPATIENT
Start: 2022-02-24 | End: 2022-05-23 | Stop reason: SDUPTHER

## 2022-02-24 RX ORDER — DIPHENOXYLATE HYDROCHLORIDE AND ATROPINE SULFATE 2.5; .025 MG/1; MG/1
1 TABLET ORAL DAILY
Qty: 30 TABLET | Refills: 11 | Status: SHIPPED | OUTPATIENT
Start: 2022-02-24 | End: 2022-05-23

## 2022-02-24 RX ORDER — FERROUS SULFATE 325(65) MG
325 TABLET ORAL
Qty: 30 TABLET | Refills: 2 | Status: SHIPPED | OUTPATIENT
Start: 2022-02-24 | End: 2022-05-23 | Stop reason: SDUPTHER

## 2022-02-28 ENCOUNTER — TELEPHONE (OUTPATIENT)
Dept: INTERNAL MEDICINE | Facility: CLINIC | Age: 42
End: 2022-02-28

## 2022-02-28 LAB — DRUGS UR: NORMAL

## 2022-02-28 RX ORDER — OFLOXACIN 3 MG/ML
5 SOLUTION AURICULAR (OTIC) 2 TIMES DAILY
Qty: 5 ML | Refills: 0 | Status: SHIPPED | OUTPATIENT
Start: 2022-02-28 | End: 2022-03-07

## 2022-02-28 NOTE — TELEPHONE ENCOUNTER
Caller: Cameron April Nickie    Relationship: Self    Best call back number: [phone]    What medications are you currently taking:   Current Outpatient Medications on File Prior to Visit   Medication Sig Dispense Refill   • clonazePAM (KlonoPIN) 0.5 MG tablet Take 1 tablet by mouth 2 (Two) Times a Day As Needed for Anxiety. 30 tablet 0   • cyclobenzaprine (FLEXERIL) 5 MG tablet Take 1 tablet by mouth 2 (Two) Times a Day As Needed for Muscle Spasms. 60 tablet 2   • ferrous sulfate 325 (65 FE) MG tablet Take 1 tablet by mouth Daily With Breakfast. Take with orange juice or vitamin C 30 tablet 2   • ibuprofen (ADVIL,MOTRIN) 800 MG tablet Take 1 tablet by mouth Every 6 (Six) Hours As Needed for Mild Pain . 60 tablet 5   • multivitamin (Multiple Vitamin) tablet tablet Take 1 tablet by mouth Daily. 30 tablet 11   • omeprazole (priLOSEC) 40 MG capsule Take 1 capsule by mouth Daily. 90 capsule 3   • ondansetron ODT (ZOFRAN-ODT) 8 MG disintegrating tablet Take 1/2 to 1 tablet by mouth (dissolve under tongue or swallow) every 8 hours as needed for nausea. 30 tablet 1   • traMADol (ULTRAM) 50 MG tablet Take 1 tablet by mouth 3 (Three) Times a Day As Needed for Moderate Pain . moderate pain 60 tablet 0   • traZODone (DESYREL) 100 MG tablet Take 1 tablet by mouth Every Night. 90 tablet 3   • vitamin D (ERGOCALCIFEROL) 1.25 MG (37052 UT) capsule capsule Take 1 capsule by mouth 1 (One) Time Per Week. 4 capsule 2     No current facility-administered medications on file prior to visit.      Which medication are you concerned about: ANTI-BIOTIC EAR DROPS     Who prescribed you this medication: TRAE     What are your concerns: PATIENT WAS IN THE OFFICE 2/21/22 AND WAS TOLD THAT ANTI-BIOTIC EAR DROPS WOULD BE PRESCRIBED.  PHARMACY DOES NOT HAVE THIS MEDICATION

## 2022-03-08 ENCOUNTER — HOSPITAL ENCOUNTER (OUTPATIENT)
Dept: ULTRASOUND IMAGING | Facility: HOSPITAL | Age: 42
End: 2022-03-08

## 2022-03-10 ENCOUNTER — TELEPHONE (OUTPATIENT)
Dept: INTERNAL MEDICINE | Facility: CLINIC | Age: 42
End: 2022-03-10

## 2022-03-10 NOTE — TELEPHONE ENCOUNTER
Caller: CameronAlena    Relationship: Self    Best call back number: [phone]    What is the best time to reach you: ANYTIME    Who are you requesting to speak with (clinical staff, provider,  specific staff member): CLINICAL STAFF    Do you know the name of the person who called: SELF    What was the call regarding: PATIENT STATES THAT SHE WOULD LIKE A CALL FROM THE NURSE ABOUT SCHEDULING A MAMMOGRAM.    Do you require a callback: YES

## 2022-03-10 NOTE — TELEPHONE ENCOUNTER
LOGANM for Pt to Contact Dr FIERRO's office, that is who follows the Patient for breast cancer. Advised if any issues to call the office and we will proceed from there.

## 2022-03-18 ENCOUNTER — TRANSCRIBE ORDERS (OUTPATIENT)
Dept: MAMMOGRAPHY | Facility: HOSPITAL | Age: 42
End: 2022-03-18

## 2022-03-18 DIAGNOSIS — C50.411 MALIGNANT NEOPLASM OF UPPER-OUTER QUADRANT OF RIGHT FEMALE BREAST, UNSPECIFIED ESTROGEN RECEPTOR STATUS: Primary | ICD-10-CM

## 2022-03-22 ENCOUNTER — NURSE NAVIGATOR (OUTPATIENT)
Dept: OTHER | Facility: HOSPITAL | Age: 42
End: 2022-03-22

## 2022-03-22 NOTE — PROGRESS NOTES
I spoke to patient to confirm with her that she has mammogram scheduled for 3/29/2022 @1:00 and then she will see Dr FIERRO in f/u 4/4/2022 @ 10AM - patient states that she will be able to make both of these appointments- I let patient know that she will see Dr FIERRO in his office - [facility] [address]. She verbalized understanding.

## 2022-03-29 ENCOUNTER — HOSPITAL ENCOUNTER (OUTPATIENT)
Dept: MAMMOGRAPHY | Facility: HOSPITAL | Age: 42
Discharge: HOME | End: 2022-03-29

## 2022-03-29 ENCOUNTER — HOSPITAL ENCOUNTER (OUTPATIENT)
Dept: ULTRASOUND IMAGING | Facility: HOSPITAL | Age: 42
Discharge: HOME | End: 2022-03-29

## 2022-03-29 DIAGNOSIS — C50.411 MALIGNANT NEOPLASM OF UPPER-OUTER QUADRANT OF RIGHT FEMALE BREAST, UNSPECIFIED ESTROGEN RECEPTOR STATUS: ICD-10-CM

## 2022-03-29 PROCEDURE — 76642 ULTRASOUND BREAST LIMITED: CPT

## 2022-03-29 PROCEDURE — 76642 ULTRASOUND BREAST LIMITED: CPT | Performed by: RADIOLOGY

## 2022-03-29 PROCEDURE — G0279 TOMOSYNTHESIS, MAMMO: HCPCS

## 2022-03-29 PROCEDURE — 77066 DX MAMMO INCL CAD BI: CPT | Performed by: RADIOLOGY

## 2022-03-29 PROCEDURE — 77062 BREAST TOMOSYNTHESIS BI: CPT | Performed by: RADIOLOGY

## 2022-03-29 PROCEDURE — 77066 DX MAMMO INCL CAD BI: CPT

## 2022-04-18 ENCOUNTER — HOSPITAL ENCOUNTER (OUTPATIENT)
Dept: ULTRASOUND IMAGING | Facility: HOSPITAL | Age: 42
Discharge: HOME | End: 2022-04-18
Admitting: NURSE PRACTITIONER

## 2022-04-18 DIAGNOSIS — R10.11 RUQ PAIN: ICD-10-CM

## 2022-04-18 DIAGNOSIS — R11.2 NON-INTRACTABLE VOMITING WITH NAUSEA: ICD-10-CM

## 2022-04-18 PROCEDURE — 76705 ECHO EXAM OF ABDOMEN: CPT

## 2022-05-03 ENCOUNTER — APPOINTMENT (OUTPATIENT)
Dept: MAMMOGRAPHY | Facility: HOSPITAL | Age: 42
End: 2022-05-03

## 2022-05-23 ENCOUNTER — LAB (OUTPATIENT)
Dept: LAB | Facility: HOSPITAL | Age: 42
End: 2022-05-23

## 2022-05-23 ENCOUNTER — OFFICE VISIT (OUTPATIENT)
Dept: INTERNAL MEDICINE | Facility: CLINIC | Age: 42
End: 2022-05-23

## 2022-05-23 VITALS
HEART RATE: 74 BPM | WEIGHT: 178 LBS | HEIGHT: 67 IN | TEMPERATURE: 96.9 F | SYSTOLIC BLOOD PRESSURE: 138 MMHG | DIASTOLIC BLOOD PRESSURE: 80 MMHG | RESPIRATION RATE: 16 BRPM | BODY MASS INDEX: 27.94 KG/M2 | OXYGEN SATURATION: 98 %

## 2022-05-23 DIAGNOSIS — M51.36 DEGENERATION OF INTERVERTEBRAL DISC OF LUMBAR REGION: ICD-10-CM

## 2022-05-23 DIAGNOSIS — F41.9 ANXIETY: ICD-10-CM

## 2022-05-23 DIAGNOSIS — N80.9 ENDOMETRIOSIS: ICD-10-CM

## 2022-05-23 DIAGNOSIS — Z79.899 MEDICATION MANAGEMENT: ICD-10-CM

## 2022-05-23 DIAGNOSIS — E55.9 VITAMIN D DEFICIENCY: ICD-10-CM

## 2022-05-23 DIAGNOSIS — K21.9 GASTROESOPHAGEAL REFLUX DISEASE WITHOUT ESOPHAGITIS: ICD-10-CM

## 2022-05-23 DIAGNOSIS — D50.8 IRON DEFICIENCY ANEMIA SECONDARY TO INADEQUATE DIETARY IRON INTAKE: Primary | ICD-10-CM

## 2022-05-23 DIAGNOSIS — G47.09 OTHER INSOMNIA: ICD-10-CM

## 2022-05-23 PROCEDURE — 99214 OFFICE O/P EST MOD 30 MIN: CPT | Performed by: NURSE PRACTITIONER

## 2022-05-23 RX ORDER — OMEPRAZOLE 40 MG/1
40 CAPSULE, DELAYED RELEASE ORAL DAILY
Qty: 90 CAPSULE | Refills: 3 | Status: SHIPPED | OUTPATIENT
Start: 2022-05-23

## 2022-05-23 RX ORDER — ERGOCALCIFEROL 1.25 MG/1
50000 CAPSULE ORAL WEEKLY
Qty: 4 CAPSULE | Refills: 2 | Status: SHIPPED | OUTPATIENT
Start: 2022-05-23 | End: 2022-09-18 | Stop reason: SDUPTHER

## 2022-05-23 RX ORDER — TRAZODONE HYDROCHLORIDE 100 MG/1
100 TABLET ORAL NIGHTLY
Qty: 90 TABLET | Refills: 3 | Status: SHIPPED | OUTPATIENT
Start: 2022-05-23 | End: 2022-07-07 | Stop reason: SDUPTHER

## 2022-05-23 RX ORDER — CYCLOBENZAPRINE HCL 5 MG
5 TABLET ORAL 2 TIMES DAILY PRN
Qty: 60 TABLET | Refills: 2 | Status: SHIPPED | OUTPATIENT
Start: 2022-05-23

## 2022-05-23 RX ORDER — IBUPROFEN 800 MG/1
800 TABLET ORAL EVERY 6 HOURS PRN
Qty: 60 TABLET | Refills: 5 | Status: SHIPPED | OUTPATIENT
Start: 2022-05-23 | End: 2022-10-17

## 2022-05-23 RX ORDER — TRAMADOL HYDROCHLORIDE 50 MG/1
50 TABLET ORAL 3 TIMES DAILY PRN
Qty: 60 TABLET | Refills: 0 | Status: SHIPPED | OUTPATIENT
Start: 2022-05-23 | End: 2022-08-24

## 2022-05-23 RX ORDER — ONDANSETRON 8 MG/1
TABLET, ORALLY DISINTEGRATING ORAL
Qty: 30 TABLET | Refills: 1 | Status: SHIPPED | OUTPATIENT
Start: 2022-05-23

## 2022-05-23 RX ORDER — CLONAZEPAM 0.5 MG/1
0.5 TABLET ORAL DAILY PRN
Qty: 30 TABLET | Refills: 0 | Status: SHIPPED | OUTPATIENT
Start: 2022-05-23 | End: 2022-08-24

## 2022-05-23 RX ORDER — FERROUS SULFATE 325(65) MG
325 TABLET ORAL
Qty: 30 TABLET | Refills: 2 | Status: SHIPPED | OUTPATIENT
Start: 2022-05-23 | End: 2022-08-01 | Stop reason: SDUPTHER

## 2022-05-23 NOTE — PROGRESS NOTES
Subjective   Chief Complaint   Patient presents with   • Anxiety      April Nickie Barnes is a 42 y.o. female.     The patient is here today for anxiety.    Anxiety  The patient states that she is doing well on her medications, and she only needs to take clonazepam intermittently. She adds that she has not taken it for a few weeks.    Endometriosis  The patient states that she has skipped 2 menstrual cycles since 01/2022, although, she denies having menopausal symptoms. She states she has been having some pain on her right side. She states that she has many co-workers who are female who also have been intermittently skipping their menstrual cycles. The patient has a history of weight loss with intermittent fasting. She reports that she cannot be pregnant because she has been celibate since 10/2021. She adds that her pap smear is up-to-date. The patient would like a refill for tramadol and ibuprofen.    Iron deficiency  The patient states she has not started taking iron supplements because she was told to take it along with vitamin C. She is concerned about taking vitamin C because she cannot drink orange juice as it burns her mouth. She reports that she uses Emergen-C packets. The patient is inquiring if there is 1 vitamin that has all the daily vitamins and minerals that she requires because she has a hard time remembering to take multiple pills. She has not had labs drawn today.     Insomnia  The patient states that, intermittently, she has difficulty sleeping. She would like a refill on trazodone as this does help her sleep. She takes this medication as-needed.    Breast cancer  The patient states that she recently saw her surgeon due to breast discomfort. At that time, she underwent a mammogram which was normal, although, it did reveal cysts in her bilateral breasts. She adds that she has fibrocystic breast tissue. The patient is scheduled for an MRI in 07/2022. She wonders what she can do to relieve the  "discomfort from her breast cysts. She also wonders if taking high doses of vitamin C can prevent the recurrence of cancer.     Gallbladder  The patient is inquiring about the results of a gallbladder ultrasound she had previously obtained while in the hospital. She states that she feels a \"flutter\" sensation with intermittent mild pain. She reports that she has been taking omeprazole for occasional vomiting which has decreased her symptoms. The patient adds that she does not want to undergo surgery; therefore, she will continue watching her diet and taking omeprazole. She was previously given Zofran at the hospital which she takes at work when she has nausea. The patient adds that a cyst was seen during her ultrasound on her left ovary, although, she was having some pain on the contralateral side.    The patient is going through a separation and divorce which she is doing well with. She works at night which works well for her.     I have reviewed the following portions of the patient's history and confirmed they are accurate: allergies, current medications, past family history, past medical history, past social history, past surgical history, and problem list    I have personally completed the patient's review of systems.    Review of Systems   Constitutional: Positive for fatigue. Negative for activity change, appetite change, chills, diaphoresis, fever, unexpected weight gain and unexpected weight loss.   HENT: Negative for congestion, ear discharge, ear pain, mouth sores, nosebleeds, rhinorrhea, sinus pressure, sneezing and sore throat.    Eyes: Negative for pain, discharge and itching.   Respiratory: Negative for cough, chest tightness, shortness of breath and wheezing.    Cardiovascular: Negative for chest pain, palpitations and leg swelling.   Gastrointestinal: Positive for abdominal pain and nausea. Negative for constipation, diarrhea and vomiting.   Endocrine: Negative for heat intolerance, polydipsia and " polyphagia.   Genitourinary: Positive for menstrual problem and pelvic pain. Negative for dysuria, flank pain, frequency, hematuria and urgency.   Musculoskeletal: Positive for arthralgias, back pain and myalgias. Negative for gait problem, joint swelling, neck pain and neck stiffness.   Skin: Negative for color change, dry skin, pallor and rash.   Allergic/Immunologic: Negative for environmental allergies and immunocompromised state.   Neurological: Negative for seizures, speech difficulty, weakness and numbness.   Hematological: Negative for adenopathy.   Psychiatric/Behavioral: Positive for stress. Negative for agitation, decreased concentration, sleep disturbance, suicidal ideas and depressed mood. The patient is nervous/anxious.        Current Outpatient Medications on File Prior to Visit   Medication Sig   • cyclobenzaprine (FLEXERIL) 5 MG tablet Take 1 tablet by mouth 2 (Two) Times a Day As Needed for Muscle Spasms.   • omeprazole (priLOSEC) 40 MG capsule Take 1 capsule by mouth Daily.   • ondansetron ODT (ZOFRAN-ODT) 8 MG disintegrating tablet Take 1/2 to 1 tablet by mouth (dissolve under tongue or swallow) every 8 hours as needed for nausea.   • traZODone (DESYREL) 100 MG tablet Take 1 tablet by mouth Every Night.   • [DISCONTINUED] clonazePAM (KlonoPIN) 0.5 MG tablet Take 1 tablet by mouth 2 (Two) Times a Day As Needed for Anxiety.   • [DISCONTINUED] ferrous sulfate 325 (65 FE) MG tablet Take 1 tablet by mouth Daily With Breakfast. Take with orange juice or vitamin C   • [DISCONTINUED] ibuprofen (ADVIL,MOTRIN) 800 MG tablet Take 1 tablet by mouth Every 6 (Six) Hours As Needed for Mild Pain .   • [DISCONTINUED] multivitamin (Multiple Vitamin) tablet tablet Take 1 tablet by mouth Daily.   • [DISCONTINUED] traMADol (ULTRAM) 50 MG tablet Take 1 tablet by mouth 3 (Three) Times a Day As Needed for Moderate Pain . moderate pain   • [DISCONTINUED] vitamin D (ERGOCALCIFEROL) 1.25 MG (15210 UT) capsule capsule Take  "1 capsule by mouth 1 (One) Time Per Week.     No current facility-administered medications on file prior to visit.       Objective   Vitals:    05/23/22 1543   BP: 138/80   BP Location: Left arm   Patient Position: Sitting   Pulse: 74   Resp: 16   Temp: 96.9 °F (36.1 °C)   TempSrc: Infrared   SpO2: 98%   Weight: 80.7 kg (178 lb)   Height: 170.2 cm (67\")     Body mass index is 27.88 kg/m².    Physical Exam  Constitutional:       Appearance: Normal appearance. She is well-developed.   HENT:      Head: Normocephalic and atraumatic.      Nose: Nose normal.   Eyes:      General: Lids are normal.      Conjunctiva/sclera: Conjunctivae normal.   Neck:      Trachea: Trachea normal.   Pulmonary:      Effort: No respiratory distress.   Abdominal:      General: Abdomen is flat. Bowel sounds are normal.      Palpations: Abdomen is soft.      Tenderness: There is no abdominal tenderness.   Neurological:      Mental Status: She is alert and oriented to person, place, and time.      GCS: GCS eye subscore is 4. GCS verbal subscore is 5. GCS motor subscore is 6.   Psychiatric:         Attention and Perception: Attention normal.         Mood and Affect: Mood normal.         Speech: Speech normal.         Behavior: Behavior normal. Behavior is cooperative.         Thought Content: Thought content normal.         Assessment & Plan   Problem List Items Addressed This Visit        Genitourinary and Reproductive     Endometriosis    Relevant Medications    traMADol (ULTRAM) 50 MG tablet    ibuprofen (ADVIL,MOTRIN) 800 MG tablet      Other Visit Diagnoses     Iron deficiency anemia secondary to inadequate dietary iron intake    -  Primary    Relevant Medications    ferrous sulfate 325 (65 FE) MG tablet    Anxiety        Relevant Medications    clonazePAM (KlonoPIN) 0.5 MG tablet    Vitamin D deficiency        Relevant Medications    vitamin D (ERGOCALCIFEROL) 1.25 MG (54476 UT) capsule capsule    Medication management        Relevant Orders "    Compliance Drug Analysis, Ur - Urine, Clean Catch         PLAN  1. Iron deficiency anemia  -Chronic, unstable.  -Will start taking iron supplement and multivitamin daily.  -Will follow high iron diet.  -Will repeat labs at next follow-up in 3 months.    2. Anxiety  -Chronic, stable.  -Continue Klonopin as-needed.     3. Endometriosis  -Chronic, stable.  -Continue ibuprofen and tramadol p.r.n.  -Will continue to follow up with gynecologist.    4. Vitamin D deficiency  -Chronic, unstable.  -Continue vitamin D supplement.  -Recheck labs at next follow-up.     Current Outpatient Medications:   •  clonazePAM (KlonoPIN) 0.5 MG tablet, Take 1 tablet by mouth Daily As Needed for Anxiety., Disp: 30 tablet, Rfl: 0  •  cyclobenzaprine (FLEXERIL) 5 MG tablet, Take 1 tablet by mouth 2 (Two) Times a Day As Needed for Muscle Spasms., Disp: 60 tablet, Rfl: 2  •  ferrous sulfate 325 (65 FE) MG tablet, Take 1 tablet by mouth Daily With Breakfast. Take with orange juice or vitamin C, Disp: 30 tablet, Rfl: 2  •  ibuprofen (ADVIL,MOTRIN) 800 MG tablet, Take 1 tablet by mouth Every 6 (Six) Hours As Needed for Mild Pain ., Disp: 60 tablet, Rfl: 5  •  omeprazole (priLOSEC) 40 MG capsule, Take 1 capsule by mouth Daily., Disp: 90 capsule, Rfl: 3  •  ondansetron ODT (ZOFRAN-ODT) 8 MG disintegrating tablet, Take 1/2 to 1 tablet by mouth (dissolve under tongue or swallow) every 8 hours as needed for nausea., Disp: 30 tablet, Rfl: 1  •  traMADol (ULTRAM) 50 MG tablet, Take 1 tablet by mouth 3 (Three) Times a Day As Needed for Moderate Pain . moderate pain, Disp: 60 tablet, Rfl: 0  •  traZODone (DESYREL) 100 MG tablet, Take 1 tablet by mouth Every Night., Disp: 90 tablet, Rfl: 3  •  vitamin D (ERGOCALCIFEROL) 1.25 MG (91884 UT) capsule capsule, Take 1 capsule by mouth 1 (One) Time Per Week., Disp: 4 capsule, Rfl: 2       Plan of care reviewed with the patient at the conclusion of today's visit.  Education was provided regarding diagnosis,  management, and any prescribed or recommended OTC medications.  Patient verbalized understanding of and agreement with management plan.     Return in about 3 months (around 8/23/2022), or if symptoms worsen or fail to improve.      Transcribed from ambient dictation for MACY Birmingham by MACY Birmingham.  05/23/22   16:25 EDT    Patient verbalized consent to the visit recording.     Transcribed from ambient dictation for MACY Birmingham by MIRELA CARVER.  05/23/22   18:00 EDT    Patient verbalized consent to the visit recording.

## 2022-06-02 LAB — DRUGS UR: NORMAL

## 2022-07-07 DIAGNOSIS — G47.09 OTHER INSOMNIA: ICD-10-CM

## 2022-07-07 RX ORDER — TRAZODONE HYDROCHLORIDE 100 MG/1
100 TABLET ORAL NIGHTLY
Qty: 90 TABLET | Refills: 1 | Status: SHIPPED | OUTPATIENT
Start: 2022-07-07 | End: 2022-08-01 | Stop reason: SDUPTHER

## 2022-07-07 NOTE — TELEPHONE ENCOUNTER
Caller: Cameron April Nickie    Relationship: Self    Best call back number:[phone]    Requested Prescriptions:   Requested Prescriptions     Pending Prescriptions Disp Refills   • traZODone (DESYREL) 100 MG tablet 90 tablet 3     Sig: Take 1 tablet by mouth Every Night.        Pharmacy where request should be sent: [facility] [address] [place] - [phone] [facility] [phone]      Additional details provided by patient: PATIENT STATES SHE THINKS SHE NEEDS AN INCREASE AS SHE HAS BEEN TAKING MORE OF IT AS SHE FEELS HER BODY HAS GOTTEN USED TO THIS. PATIENT STATES SHE IS 22 DAYS INTO THE PRESCRIPTION AND HAS LESS THAN 3 DAYS NOW. PATIENT ASKING FOR THE MEDICATION INCREASED -200MG FOR 90 DAYS WORTH    Does the patient have less than a 3 day supply:  [x] Yes  [] No    Luiza Casey Rep   07/07/22 12:45 EDT

## 2022-07-07 NOTE — TELEPHONE ENCOUNTER
Rx Refill Note  Requested Prescriptions     Pending Prescriptions Disp Refills   • traZODone (DESYREL) 100 MG tablet 90 tablet 3     Sig: Take 1 tablet by mouth Every Night.      Last office visit with prescribing clinician: 5/23/2022      Next office visit with prescribing clinician: Visit date not found            Tarun Mcneill MA  07/07/22, 12:55 EDT

## 2022-07-19 ENCOUNTER — TELEPHONE (OUTPATIENT)
Dept: INTERNAL MEDICINE | Facility: CLINIC | Age: 42
End: 2022-07-19

## 2022-07-19 NOTE — TELEPHONE ENCOUNTER
Trazodone 100mg has always been prescribed take one tablet nightly as needed. There have been no changes since 4/2021 when she was taking a different dose. She needs to follow up if taking more than 100mg nightly.

## 2022-07-19 NOTE — TELEPHONE ENCOUNTER
Pharmacy Name: [facility] [address] [place] - [phone] [facility] [phone]      Pharmacy representative name: KAREN    Pharmacy representative phone number: [phone]    What medication are you calling in regards to: TRAZADONE    What question does the pharmacy have: PATIENT IS STATING THAT THE DIRECTIONS FOR THE MEDICATION HAS CHANGED AND IS ASKING TO FILL EARLY.  KAREN STATED THAT BOT THE PRESCRIPTIONS SHE HAS STATE TO TAKE ON PILL A DAY    Who is the provider that prescribed the medication: TRAE     Additional notes:

## 2022-08-01 ENCOUNTER — OFFICE VISIT (OUTPATIENT)
Dept: INTERNAL MEDICINE | Facility: CLINIC | Age: 42
End: 2022-08-01

## 2022-08-01 ENCOUNTER — LAB (OUTPATIENT)
Dept: LAB | Facility: HOSPITAL | Age: 42
End: 2022-08-01

## 2022-08-01 VITALS
HEIGHT: 67 IN | TEMPERATURE: 97.1 F | HEART RATE: 68 BPM | BODY MASS INDEX: 27.53 KG/M2 | SYSTOLIC BLOOD PRESSURE: 142 MMHG | OXYGEN SATURATION: 99 % | WEIGHT: 175.4 LBS | DIASTOLIC BLOOD PRESSURE: 90 MMHG | RESPIRATION RATE: 18 BRPM

## 2022-08-01 DIAGNOSIS — G47.09 OTHER INSOMNIA: Primary | ICD-10-CM

## 2022-08-01 DIAGNOSIS — D50.8 IRON DEFICIENCY ANEMIA SECONDARY TO INADEQUATE DIETARY IRON INTAKE: ICD-10-CM

## 2022-08-01 DIAGNOSIS — E53.9 VITAMIN B DEFICIENCY: ICD-10-CM

## 2022-08-01 DIAGNOSIS — R03.0 ELEVATED BLOOD PRESSURE READING: ICD-10-CM

## 2022-08-01 DIAGNOSIS — E55.9 VITAMIN D DEFICIENCY: ICD-10-CM

## 2022-08-01 PROBLEM — F32.A ANXIETY AND DEPRESSION: Status: ACTIVE | Noted: 2022-08-01

## 2022-08-01 PROBLEM — B35.9 DERMATOPHYTOSIS: Status: ACTIVE | Noted: 2022-08-01

## 2022-08-01 PROBLEM — M51.16 NEURITIS OR RADICULITIS DUE TO RUPTURE OF LUMBAR INTERVERTEBRAL DISC: Status: ACTIVE | Noted: 2022-08-01

## 2022-08-01 PROBLEM — M79.606 PAIN OF LOWER EXTREMITY: Status: ACTIVE | Noted: 2022-08-01

## 2022-08-01 PROBLEM — F41.9 ANXIETY AND DEPRESSION: Status: ACTIVE | Noted: 2022-08-01

## 2022-08-01 PROBLEM — M54.50 LOW BACK PAIN: Status: ACTIVE | Noted: 2022-08-01

## 2022-08-01 PROBLEM — K64.9 HEMORRHOIDS: Status: ACTIVE | Noted: 2022-08-01

## 2022-08-01 PROBLEM — M79.10 MUSCLE PAIN: Status: ACTIVE | Noted: 2022-08-01

## 2022-08-01 PROBLEM — R20.2 PARESTHESIA: Status: ACTIVE | Noted: 2022-08-01

## 2022-08-01 LAB
BASOPHILS # BLD AUTO: 0.05 10*3/MM3 (ref 0–0.2)
BASOPHILS NFR BLD AUTO: 0.8 % (ref 0–1.5)
DEPRECATED RDW RBC AUTO: 50 FL (ref 37–54)
EOSINOPHIL # BLD AUTO: 0.03 10*3/MM3 (ref 0–0.4)
EOSINOPHIL NFR BLD AUTO: 0.5 % (ref 0.3–6.2)
ERYTHROCYTE [DISTWIDTH] IN BLOOD BY AUTOMATED COUNT: 14.8 % (ref 12.3–15.4)
HCT VFR BLD AUTO: 40.2 % (ref 34–46.6)
HGB BLD-MCNC: 13 G/DL (ref 12–15.9)
IMM GRANULOCYTES # BLD AUTO: 0.01 10*3/MM3 (ref 0–0.05)
IMM GRANULOCYTES NFR BLD AUTO: 0.2 % (ref 0–0.5)
LYMPHOCYTES # BLD AUTO: 2.08 10*3/MM3 (ref 0.7–3.1)
LYMPHOCYTES NFR BLD AUTO: 34 % (ref 19.6–45.3)
MCH RBC QN AUTO: 29.9 PG (ref 26.6–33)
MCHC RBC AUTO-ENTMCNC: 32.3 G/DL (ref 31.5–35.7)
MCV RBC AUTO: 92.4 FL (ref 79–97)
MONOCYTES # BLD AUTO: 0.4 10*3/MM3 (ref 0.1–0.9)
MONOCYTES NFR BLD AUTO: 6.5 % (ref 5–12)
NEUTROPHILS NFR BLD AUTO: 3.54 10*3/MM3 (ref 1.7–7)
NEUTROPHILS NFR BLD AUTO: 58 % (ref 42.7–76)
NRBC BLD AUTO-RTO: 0 /100 WBC (ref 0–0.2)
PLATELET # BLD AUTO: 199 10*3/MM3 (ref 140–450)
PMV BLD AUTO: 12.9 FL (ref 6–12)
RBC # BLD AUTO: 4.35 10*6/MM3 (ref 3.77–5.28)
WBC NRBC COR # BLD: 6.11 10*3/MM3 (ref 3.4–10.8)

## 2022-08-01 PROCEDURE — 82728 ASSAY OF FERRITIN: CPT | Performed by: NURSE PRACTITIONER

## 2022-08-01 PROCEDURE — 82746 ASSAY OF FOLIC ACID SERUM: CPT | Performed by: NURSE PRACTITIONER

## 2022-08-01 PROCEDURE — 80053 COMPREHEN METABOLIC PANEL: CPT | Performed by: NURSE PRACTITIONER

## 2022-08-01 PROCEDURE — 85025 COMPLETE CBC W/AUTO DIFF WBC: CPT | Performed by: NURSE PRACTITIONER

## 2022-08-01 PROCEDURE — 83540 ASSAY OF IRON: CPT | Performed by: NURSE PRACTITIONER

## 2022-08-01 PROCEDURE — 82607 VITAMIN B-12: CPT | Performed by: NURSE PRACTITIONER

## 2022-08-01 PROCEDURE — 99214 OFFICE O/P EST MOD 30 MIN: CPT | Performed by: NURSE PRACTITIONER

## 2022-08-01 PROCEDURE — 82306 VITAMIN D 25 HYDROXY: CPT | Performed by: NURSE PRACTITIONER

## 2022-08-01 PROCEDURE — 84466 ASSAY OF TRANSFERRIN: CPT | Performed by: NURSE PRACTITIONER

## 2022-08-01 RX ORDER — TRAZODONE HYDROCHLORIDE 100 MG/1
TABLET ORAL
Qty: 60 TABLET | Refills: 2 | Status: SHIPPED | OUTPATIENT
Start: 2022-08-01 | End: 2022-11-07

## 2022-08-01 RX ORDER — FERROUS SULFATE 325(65) MG
325 TABLET ORAL
Qty: 30 TABLET | Refills: 5 | Status: SHIPPED | OUTPATIENT
Start: 2022-08-01 | End: 2023-01-04 | Stop reason: SDUPTHER

## 2022-08-01 RX ORDER — DIPHENOXYLATE HYDROCHLORIDE AND ATROPINE SULFATE 2.5; .025 MG/1; MG/1
1 TABLET ORAL DAILY
Qty: 30 TABLET | Refills: 5 | Status: SHIPPED | OUTPATIENT
Start: 2022-08-01 | End: 2023-01-04 | Stop reason: SDUPTHER

## 2022-08-01 NOTE — PROGRESS NOTES
"Subjective   Chief Complaint   Patient presents with   • Med Refill   • discuss medication     Iron supp./ multi vit.   Would like to increase trazodone dose       Alena Barnes is a 42 y.o. female.     The patient is here today for insomnia and to discuss abnormal lab work.    Insomnia- She was able to get her trazadone filled at her pharmacy. She notes having difficulty sleeping due to her work. She works 7:00 PM to 3:00 AM. She states takes she will take trazadone, sleep for a few hours, and then will have to take trazadone again to fall back asleep. She notes a mix up between her dogs trazadone prescription and her trazadone prescription, but that issue has been resolved. She intermittently has to take more than 1 tablet of trazadone at nighttime to be able to sleep well approximately 4 times per week.    Vitamin supplements- She states she takes 3,000 to 5,000 mg of vitamin C per day to help improve her immune system. She takes the tablets and gummies. She takes vitamin C packets when she gets ill, which she believes subsides her symptoms. When she first started taking vitamin C, she experienced am upset stomach, which has now resolved. She states she did get her radiation, and she also states she is not \"taking the hormone that was going to put me in menopause\". Since starting vitamin C, her energy level has increased, she feels less fatigued, and she is not getting ill as much. She also takes one 180 mg vitamin A soft gel tablet per day as an antiaging preventative. She also takes 400 mg of vitamin E per day, which has significantly improved her breast pain due to having a cyst. She also takes an iron supplement, but ran out last week.    Elevated BP- Her blood pressure is elevated today, but she states she drank and Redbull energy drink before her appointment today; however, her blood pressure has been elevated since 01/2022. She notes experiencing stress. She no longer drinks hot coffee and only " "drinks iced coffee. She also drinks citrus green tea, sprite, and Gatorade. She occasionally drinks alcohol, but did go through a period in 2021 when she drank alcohol frequently.     Pain- Her back pain and knee pain has improved since losing weight. She takes ibuprofen during her menstrual cycles, and states she has decreased her ibuprofen intake between her menstrual cycles.     Eye issue- She complains of an intermittent darkening over her lateral eye with vision obstruction.     She states she has greatly adjusted her diet and her lifestyle. She does intermittent fasting. She also states \"I feel the best I felt ever in my life right now, physically.\"     I have reviewed the following portions of the patient's history and confirmed they are accurate: allergies, current medications, past family history, past medical history, past social history, past surgical history, and problem list    I have personally completed the patient's review of systems.    Review of Systems   Constitutional: Positive for fatigue. Negative for activity change, appetite change, chills, diaphoresis, fever, unexpected weight gain and unexpected weight loss.   HENT: Negative for congestion, ear discharge, ear pain, mouth sores, nosebleeds, rhinorrhea, sinus pressure, sneezing and sore throat.    Eyes: Negative for pain, discharge and itching.   Respiratory: Negative for cough, chest tightness, shortness of breath and wheezing.    Cardiovascular: Negative for chest pain, palpitations and leg swelling.   Gastrointestinal: Positive for abdominal pain and nausea. Negative for constipation, diarrhea and vomiting.   Endocrine: Negative for heat intolerance, polydipsia and polyphagia.   Genitourinary: Positive for menstrual problem and pelvic pain. Negative for dysuria, flank pain, frequency, hematuria and urgency.   Musculoskeletal: Positive for arthralgias, back pain and myalgias. Negative for gait problem, joint swelling, neck pain and neck " "stiffness.   Skin: Negative for color change, dry skin, pallor and rash.   Allergic/Immunologic: Negative for environmental allergies and immunocompromised state.   Neurological: Negative for seizures, speech difficulty, weakness and numbness.   Hematological: Negative for adenopathy.   Psychiatric/Behavioral: Positive for stress. Negative for agitation, decreased concentration, sleep disturbance, suicidal ideas and depressed mood. The patient is nervous/anxious.        Current Outpatient Medications on File Prior to Visit   Medication Sig   • Ascorbic Acid (VITAMIN C GUMMIES PO) Take  by mouth.   • clonazePAM (KlonoPIN) 0.5 MG tablet Take 1 tablet by mouth Daily As Needed for Anxiety.   • cyclobenzaprine (FLEXERIL) 5 MG tablet Take 1 tablet by mouth 2 (Two) Times a Day As Needed for Muscle Spasms.   • ibuprofen (ADVIL,MOTRIN) 800 MG tablet Take 1 tablet by mouth Every 6 (Six) Hours As Needed for Mild Pain .   • Multiple Vitamins-Minerals (WOMENS MULTIVITAMIN + COLLAGEN PO) Take  by mouth.   • omeprazole (priLOSEC) 40 MG capsule Take 1 capsule by mouth Daily.   • ondansetron ODT (ZOFRAN-ODT) 8 MG disintegrating tablet Take 1/2 to 1 tablet by mouth (dissolve under tongue or swallow) every 8 hours as needed for nausea.   • traMADol (ULTRAM) 50 MG tablet Take 1 tablet by mouth 3 (Three) Times a Day As Needed for Moderate Pain . moderate pain   • Vitamin A 3 MG (59176 UT) capsule Take 10,000 Units by mouth Daily.   • vitamin E 200 UNIT capsule Take 200 Units by mouth Daily.   • vitamin D (ERGOCALCIFEROL) 1.25 MG (94234 UT) capsule capsule Take 1 capsule by mouth 1 (One) Time Per Week.     No current facility-administered medications on file prior to visit.       Objective   Vitals:    08/01/22 1510   BP: 142/90   Pulse: 68   Resp: 18   Temp: 97.1 °F (36.2 °C)   SpO2: 99%   Weight: 79.6 kg (175 lb 6.4 oz)   Height: 170.2 cm (67.01\")     Body mass index is 27.46 kg/m².    Physical Exam  Vitals reviewed.   Constitutional:  "      Appearance: Normal appearance. She is well-developed.   HENT:      Head: Normocephalic and atraumatic.      Nose: Nose normal.   Eyes:      General: Lids are normal.      Conjunctiva/sclera: Conjunctivae normal.      Pupils: Pupils are equal, round, and reactive to light.   Neck:      Thyroid: No thyromegaly.      Trachea: Trachea normal.   Cardiovascular:      Rate and Rhythm: Normal rate and regular rhythm.      Heart sounds: Normal heart sounds.   Pulmonary:      Effort: Pulmonary effort is normal. No respiratory distress.      Breath sounds: Normal breath sounds.   Skin:     General: Skin is warm and dry.   Neurological:      Mental Status: She is alert and oriented to person, place, and time.      GCS: GCS eye subscore is 4. GCS verbal subscore is 5. GCS motor subscore is 6.   Psychiatric:         Attention and Perception: Attention normal.         Mood and Affect: Mood normal.         Speech: Speech normal.         Behavior: Behavior normal. Behavior is cooperative.         Thought Content: Thought content normal.         Assessment & Plan   Problem List Items Addressed This Visit        Sleep    Insomnia - Primary    Relevant Medications    traZODone (DESYREL) 100 MG tablet      Other Visit Diagnoses     Iron deficiency anemia secondary to inadequate dietary iron intake        Relevant Medications    ferrous sulfate 325 (65 FE) MG tablet    Other Relevant Orders    CBC Auto Differential (Completed)    Comprehensive Metabolic Panel (Completed)    Iron Profile (Completed)    Ferritin (Completed)    Elevated blood pressure reading        Vitamin D deficiency        Relevant Orders    Vitamin D 25 Hydroxy (Completed)    Vitamin B deficiency        Relevant Orders    Vitamin B12 & Folate (Completed)         1. Insomnia  - Chronic, unstable.   - Increase trazodone.     2. Iron deficiency anemia  - Chronic, unstable.   - Continue multivitamin and iron supplement.   - Follow high iron diet.     3. Elevated blood  pressure   - New, unstable.  - Discussed with patient to decrease any energy drinks and caffeine.  -  Discussed with patient to not exceed the maximum dosage of vitamin supplements.  -  She will drink adequate amount of water.   - We will recheck blood pressure in 1 month to see if she needs to start blood pressure medication.      Current Outpatient Medications:   •  Ascorbic Acid (VITAMIN C GUMMIES PO), Take  by mouth., Disp: , Rfl:   •  clonazePAM (KlonoPIN) 0.5 MG tablet, Take 1 tablet by mouth Daily As Needed for Anxiety., Disp: 30 tablet, Rfl: 0  •  cyclobenzaprine (FLEXERIL) 5 MG tablet, Take 1 tablet by mouth 2 (Two) Times a Day As Needed for Muscle Spasms., Disp: 60 tablet, Rfl: 2  •  ferrous sulfate 325 (65 FE) MG tablet, Take 1 tablet by mouth Daily With Breakfast. Take with orange juice or vitamin C, Disp: 30 tablet, Rfl: 5  •  ibuprofen (ADVIL,MOTRIN) 800 MG tablet, Take 1 tablet by mouth Every 6 (Six) Hours As Needed for Mild Pain ., Disp: 60 tablet, Rfl: 5  •  Multiple Vitamins-Minerals (WOMENS MULTIVITAMIN + COLLAGEN PO), Take  by mouth., Disp: , Rfl:   •  omeprazole (priLOSEC) 40 MG capsule, Take 1 capsule by mouth Daily., Disp: 90 capsule, Rfl: 3  •  ondansetron ODT (ZOFRAN-ODT) 8 MG disintegrating tablet, Take 1/2 to 1 tablet by mouth (dissolve under tongue or swallow) every 8 hours as needed for nausea., Disp: 30 tablet, Rfl: 1  •  traMADol (ULTRAM) 50 MG tablet, Take 1 tablet by mouth 3 (Three) Times a Day As Needed for Moderate Pain . moderate pain, Disp: 60 tablet, Rfl: 0  •  traZODone (DESYREL) 100 MG tablet, Take 1-2 tablets by mouth one hour before bedtime as needed for sleep., Disp: 60 tablet, Rfl: 2  •  Vitamin A 3 MG (76192 UT) capsule, Take 10,000 Units by mouth Daily., Disp: , Rfl:   •  vitamin E 200 UNIT capsule, Take 200 Units by mouth Daily., Disp: , Rfl:   •  multivitamin (Multiple Vitamin) tablet tablet, Take 1 tablet by mouth Daily., Disp: 30 tablet, Rfl: 5  •  vitamin D  (ERGOCALCIFEROL) 1.25 MG (32635 UT) capsule capsule, Take 1 capsule by mouth 1 (One) Time Per Week., Disp: 4 capsule, Rfl: 2       Plan of care reviewed with the patient at the conclusion of today's visit.  Education was provided regarding diagnosis, management, and any prescribed or recommended OTC medications.  Patient verbalized understanding of and agreement with management plan.     Return in about 1 month (around 9/1/2022), or if symptoms worsen or fail to improve.      Transcribed from ambient dictation for MACY Birmingham by ULICES RIVERA.  08/01/22   17:07 EDT    Patient verbalized consent to the visit recording.

## 2022-08-02 LAB
25(OH)D3 SERPL-MCNC: 23.2 NG/ML (ref 30–100)
ALBUMIN SERPL-MCNC: 4.6 G/DL (ref 3.5–5.2)
ALBUMIN/GLOB SERPL: 2.6 G/DL
ALP SERPL-CCNC: 65 U/L (ref 39–117)
ALT SERPL W P-5'-P-CCNC: 24 U/L (ref 1–33)
ANION GAP SERPL CALCULATED.3IONS-SCNC: 11 MMOL/L (ref 5–15)
AST SERPL-CCNC: 18 U/L (ref 1–32)
BILIRUB SERPL-MCNC: 0.3 MG/DL (ref 0–1.2)
BUN SERPL-MCNC: 11 MG/DL (ref 6–20)
BUN/CREAT SERPL: 19.3 (ref 7–25)
CALCIUM SPEC-SCNC: 9.4 MG/DL (ref 8.6–10.5)
CHLORIDE SERPL-SCNC: 108 MMOL/L (ref 98–107)
CO2 SERPL-SCNC: 23 MMOL/L (ref 22–29)
CREAT SERPL-MCNC: 0.57 MG/DL (ref 0.57–1)
EGFRCR SERPLBLD CKD-EPI 2021: 115.1 ML/MIN/1.73
FERRITIN SERPL-MCNC: 19.6 NG/ML (ref 13–150)
FOLATE SERPL-MCNC: 18.3 NG/ML (ref 4.78–24.2)
GLOBULIN UR ELPH-MCNC: 1.8 GM/DL
GLUCOSE SERPL-MCNC: 65 MG/DL (ref 65–99)
IRON 24H UR-MRATE: 38 MCG/DL (ref 37–145)
IRON SATN MFR SERPL: 8 % (ref 20–50)
POTASSIUM SERPL-SCNC: 4 MMOL/L (ref 3.5–5.2)
PROT SERPL-MCNC: 6.4 G/DL (ref 6–8.5)
SODIUM SERPL-SCNC: 142 MMOL/L (ref 136–145)
TIBC SERPL-MCNC: 483 MCG/DL (ref 298–536)
TRANSFERRIN SERPL-MCNC: 324 MG/DL (ref 200–360)
VIT B12 BLD-MCNC: 390 PG/ML (ref 211–946)

## 2022-08-23 DIAGNOSIS — N80.9 ENDOMETRIOSIS: ICD-10-CM

## 2022-08-23 DIAGNOSIS — F41.9 ANXIETY: ICD-10-CM

## 2022-08-24 RX ORDER — TRAMADOL HYDROCHLORIDE 50 MG/1
TABLET ORAL
Qty: 60 TABLET | Refills: 1 | Status: SHIPPED | OUTPATIENT
Start: 2022-08-24 | End: 2022-10-18 | Stop reason: SDUPTHER

## 2022-08-24 RX ORDER — CLONAZEPAM 0.5 MG/1
TABLET ORAL
Qty: 15 TABLET | Refills: 0 | Status: SHIPPED | OUTPATIENT
Start: 2022-08-24 | End: 2023-02-14

## 2022-09-08 NOTE — TELEPHONE ENCOUNTER
Caller: Cameron April JUAN JOSE    Relationship: Self    Best call back number: [phone]      Who are you requesting to speak with (clinical staff, provider,  specific staff member): MSAmy RODNEY NIKOLE LARKIN     What was the call regarding: PATIENT WAS HOPING TO GET AN APPOINTMENT BEFORE A SURGERY THAT IS POSSIBLY GOING TO BE BEFORE HER CURRENT UPCOMING APPOINTMENT ON 06/01/2021. SHE WOULD LIKE TO TALK WITH HER PCP ABOUT HER RECENT CANCER DIAGNOSIS.     Do you require a callback: YES  
Not sure what to do with this. Usually Lisa takes care of getting them an appt. Let me know who you want me to send this to. I don't think front office gets my messages.   
Spoke w/ pt. And she just wanted to make sure Alexa was aware she had been diagnosed with breast cancer and psychiatry had started her on Clonazepam. She said she was seeing everyone through [facility] so she should have all info. She will keep her 6/1 appt.  
ADL retraining/balance training/bed mobility training/transfer training

## 2022-09-18 DIAGNOSIS — E55.9 VITAMIN D DEFICIENCY: ICD-10-CM

## 2022-09-18 RX ORDER — ERGOCALCIFEROL 1.25 MG/1
50000 CAPSULE ORAL WEEKLY
Qty: 4 CAPSULE | Refills: 2 | Status: SHIPPED | OUTPATIENT
Start: 2022-09-18

## 2022-10-15 DIAGNOSIS — N80.9 ENDOMETRIOSIS: ICD-10-CM

## 2022-10-17 RX ORDER — IBUPROFEN 800 MG/1
TABLET ORAL
Qty: 60 TABLET | Refills: 5 | Status: SHIPPED | OUTPATIENT
Start: 2022-10-17

## 2022-10-18 ENCOUNTER — HOSPITAL ENCOUNTER (OUTPATIENT)
Dept: MRI IMAGING | Facility: HOSPITAL | Age: 42
End: 2022-10-18

## 2022-10-18 ENCOUNTER — OFFICE VISIT (OUTPATIENT)
Dept: INTERNAL MEDICINE | Facility: CLINIC | Age: 42
End: 2022-10-18

## 2022-10-18 VITALS
RESPIRATION RATE: 16 BRPM | TEMPERATURE: 98.1 F | HEART RATE: 68 BPM | DIASTOLIC BLOOD PRESSURE: 84 MMHG | WEIGHT: 176 LBS | BODY MASS INDEX: 27.62 KG/M2 | OXYGEN SATURATION: 98 % | HEIGHT: 67 IN | SYSTOLIC BLOOD PRESSURE: 136 MMHG

## 2022-10-18 DIAGNOSIS — M54.41 CHRONIC BILATERAL LOW BACK PAIN WITH BILATERAL SCIATICA: ICD-10-CM

## 2022-10-18 DIAGNOSIS — F41.9 ANXIETY: Primary | ICD-10-CM

## 2022-10-18 DIAGNOSIS — L23.5 ALLERGIC DERMATITIS DUE TO OTHER CHEMICAL PRODUCT: ICD-10-CM

## 2022-10-18 DIAGNOSIS — M54.42 CHRONIC BILATERAL LOW BACK PAIN WITH BILATERAL SCIATICA: ICD-10-CM

## 2022-10-18 DIAGNOSIS — G89.29 CHRONIC BILATERAL LOW BACK PAIN WITH BILATERAL SCIATICA: ICD-10-CM

## 2022-10-18 DIAGNOSIS — Z79.899 MEDICATION MANAGEMENT: ICD-10-CM

## 2022-10-18 DIAGNOSIS — N80.9 ENDOMETRIOSIS: ICD-10-CM

## 2022-10-18 PROCEDURE — 99214 OFFICE O/P EST MOD 30 MIN: CPT | Performed by: NURSE PRACTITIONER

## 2022-10-18 RX ORDER — TRIAMCINOLONE ACETONIDE 0.25 MG/G
1 CREAM TOPICAL 2 TIMES DAILY
Qty: 30 G | Refills: 0 | Status: SHIPPED | OUTPATIENT
Start: 2022-10-18 | End: 2022-10-25

## 2022-10-18 RX ORDER — HYDROXYZINE HYDROCHLORIDE 25 MG/1
25 TABLET, FILM COATED ORAL EVERY 6 HOURS PRN
Qty: 60 TABLET | Refills: 2 | Status: SHIPPED | OUTPATIENT
Start: 2022-10-18

## 2022-10-18 RX ORDER — TRAMADOL HYDROCHLORIDE 50 MG/1
50 TABLET ORAL DAILY PRN
Qty: 30 TABLET | Refills: 2 | Status: SHIPPED | OUTPATIENT
Start: 2022-10-18 | End: 2023-01-04 | Stop reason: SDUPTHER

## 2022-10-18 RX ORDER — BUSPIRONE HYDROCHLORIDE 5 MG/1
TABLET ORAL
Qty: 90 TABLET | Refills: 2 | Status: SHIPPED | OUTPATIENT
Start: 2022-10-18 | End: 2022-12-06

## 2022-10-20 ENCOUNTER — TELEPHONE (OUTPATIENT)
Dept: INTERNAL MEDICINE | Facility: CLINIC | Age: 42
End: 2022-10-20

## 2022-10-20 NOTE — TELEPHONE ENCOUNTER
Caller: BarnesAlena diaz    Relationship: Self    Best call back number: [phone]     What is the best time to reach you: UNTIL 6 PM    Who are you requesting to speak with (clinical staff, provider,  specific staff member): CLINICAL     Do you know the name of the person who called: NA    What was the call regarding: NEW ANXIETY MEDICATION THOUGHT WAS TO BE CALLED IN    Do you require a callback: YES

## 2022-10-25 LAB — DRUGS UR: NORMAL

## 2022-11-06 DIAGNOSIS — G47.09 OTHER INSOMNIA: ICD-10-CM

## 2022-11-07 RX ORDER — TRAZODONE HYDROCHLORIDE 100 MG/1
TABLET ORAL
Qty: 60 TABLET | Refills: 2 | Status: SHIPPED | OUTPATIENT
Start: 2022-11-07 | End: 2023-01-04 | Stop reason: SDUPTHER

## 2022-11-08 ENCOUNTER — HOSPITAL ENCOUNTER (OUTPATIENT)
Dept: MRI IMAGING | Facility: HOSPITAL | Age: 42
Discharge: HOME | End: 2022-11-08
Admitting: SURGERY

## 2022-11-08 DIAGNOSIS — C50.411 MALIGNANT NEOPLASM OF UPPER-OUTER QUADRANT OF RIGHT FEMALE BREAST: ICD-10-CM

## 2022-11-08 PROCEDURE — A9577 INJ MULTIHANCE: HCPCS | Performed by: SURGERY

## 2022-11-08 PROCEDURE — 77049 MRI BREAST C-+ W/CAD BI: CPT | Performed by: RADIOLOGY

## 2022-11-08 PROCEDURE — 77049 MRI BREAST C-+ W/CAD BI: CPT

## 2022-11-08 PROCEDURE — 0 GADOBENATE DIMEGLUMINE 529 MG/ML SOLUTION: Performed by: SURGERY

## 2022-11-08 RX ADMIN — GADOBENATE DIMEGLUMINE 16 ML: 529 INJECTION, SOLUTION INTRAVENOUS at 15:43

## 2022-11-18 ENCOUNTER — TELEPHONE (OUTPATIENT)
Dept: MRI IMAGING | Facility: HOSPITAL | Age: 42
End: 2022-11-18

## 2022-11-18 NOTE — TELEPHONE ENCOUNTER
Called patient with MRI Breast results. Recommended biopsy on the right scheduled [facility] on Monday at 2:00. Pt not on BT,  Pt expressed understanding and was encouraged to call with any further questions or concerns.

## 2022-11-21 ENCOUNTER — HOSPITAL ENCOUNTER (OUTPATIENT)
Dept: MAMMOGRAPHY | Facility: HOSPITAL | Age: 42
Discharge: HOME | End: 2022-11-21

## 2022-11-21 ENCOUNTER — HOSPITAL ENCOUNTER (OUTPATIENT)
Dept: ULTRASOUND IMAGING | Facility: HOSPITAL | Age: 42
Discharge: HOME | End: 2022-11-21

## 2022-11-21 DIAGNOSIS — C50.411 MALIGNANT NEOPLASM OF UPPER-OUTER QUADRANT OF RIGHT FEMALE BREAST: ICD-10-CM

## 2022-11-21 PROCEDURE — 88342 IMHCHEM/IMCYTCHM 1ST ANTB: CPT | Performed by: SURGERY

## 2022-11-21 PROCEDURE — 19083 BX BREAST 1ST LESION US IMAG: CPT | Performed by: RADIOLOGY

## 2022-11-21 PROCEDURE — 77065 DX MAMMO INCL CAD UNI: CPT | Performed by: RADIOLOGY

## 2022-11-21 PROCEDURE — 88360 TUMOR IMMUNOHISTOCHEM/MANUAL: CPT | Performed by: SURGERY

## 2022-11-21 PROCEDURE — A4648 IMPLANTABLE TISSUE MARKER: HCPCS

## 2022-11-21 PROCEDURE — 88305 TISSUE EXAM BY PATHOLOGIST: CPT | Performed by: SURGERY

## 2022-11-21 RX ORDER — LIDOCAINE HYDROCHLORIDE 20 MG/ML
5 INJECTION, SOLUTION INFILTRATION; PERINEURAL ONCE
Status: COMPLETED | OUTPATIENT
Start: 2022-11-21 | End: 2022-11-21

## 2022-11-21 RX ORDER — LIDOCAINE HYDROCHLORIDE AND EPINEPHRINE 20; 5 MG/ML; UG/ML
5 INJECTION, SOLUTION EPIDURAL; INFILTRATION; INTRACAUDAL; PERINEURAL ONCE
Status: COMPLETED | OUTPATIENT
Start: 2022-11-21 | End: 2022-11-21

## 2022-11-21 RX ADMIN — LIDOCAINE HYDROCHLORIDE 2.5 ML: 20 INJECTION, SOLUTION INFILTRATION; PERINEURAL at 15:51

## 2022-11-21 RX ADMIN — LIDOCAINE HYDROCHLORIDE,EPINEPHRINE BITARTRATE 5 ML: 20; .005 INJECTION, SOLUTION EPIDURAL; INFILTRATION; INTRACAUDAL; PERINEURAL at 15:51

## 2022-11-21 NOTE — PROGRESS NOTES
Alert and orientated. Denies discomfort, no active bleeding, steri-strips intact, gauze dressing applied. Ice packs given. Verbalizes and demonstrates understanding of post-care instructions, written copy given.

## 2022-11-22 ENCOUNTER — TELEPHONE (OUTPATIENT)
Dept: INTERNAL MEDICINE | Facility: CLINIC | Age: 42
End: 2022-11-22

## 2022-11-22 RX ORDER — FLUCONAZOLE 150 MG/1
TABLET ORAL
Qty: 2 TABLET | Refills: 0 | Status: SHIPPED | OUTPATIENT
Start: 2022-11-22 | End: 2023-02-14

## 2022-11-22 NOTE — TELEPHONE ENCOUNTER
Caller: Alena Barnes    Relationship: Self    Best call back number: [phone]    What medication are you requesting:     What are your current symptoms: YEAST INFECTION    How long have you been experiencing symptoms: ABOUT A WEEK  Have you had these symptoms before: [x] Yes  [] No    Have you been treated for these symptoms before: [x] Yes  [] No    If a prescription is needed, what is your preferred pharmacy and phone number: [facility] [id number] - [address] & [address] [facility] [phone] [facility] [phone] FX

## 2022-12-06 DIAGNOSIS — F41.9 ANXIETY: ICD-10-CM

## 2022-12-06 RX ORDER — BUSPIRONE HYDROCHLORIDE 5 MG/1
TABLET ORAL
Qty: 90 TABLET | Refills: 2 | Status: SHIPPED | OUTPATIENT
Start: 2022-12-06

## 2022-12-07 ENCOUNTER — NURSE NAVIGATOR (OUTPATIENT)
Dept: OTHER | Facility: HOSPITAL | Age: 42
End: 2022-12-07

## 2022-12-07 DIAGNOSIS — C50.911 MALIGNANT NEOPLASM OF RIGHT BREAST IN FEMALE, ESTROGEN RECEPTOR POSITIVE, UNSPECIFIED SITE OF BREAST: Primary | ICD-10-CM

## 2022-12-07 DIAGNOSIS — Z17.0 MALIGNANT NEOPLASM OF RIGHT BREAST IN FEMALE, ESTROGEN RECEPTOR POSITIVE, UNSPECIFIED SITE OF BREAST: Primary | ICD-10-CM

## 2022-12-07 NOTE — PROGRESS NOTES
I saw patient with Dr FIERRO. Her reviewed the pathology report with the patient - stage IA, IG IDC, ER/MA positive and HER negative. This is a recurrence- she had negative genetic testing a year and a half ago. Her present finding was with breast MRI - she will undergo BCT with wire localization and SLN. WE discussed hormone blocking agent and radiation after surgery- she is in agreement for radiation and is willing to consult with Medical Oncologist regarding hormone blockade.

## 2022-12-08 ENCOUNTER — TRANSCRIBE ORDERS (OUTPATIENT)
Dept: ADMINISTRATIVE | Facility: HOSPITAL | Age: 42
End: 2022-12-08

## 2022-12-08 DIAGNOSIS — C50.411 MALIGNANT NEOPLASM OF UPPER-OUTER QUADRANT OF RIGHT FEMALE BREAST, UNSPECIFIED ESTROGEN RECEPTOR STATUS: Primary | ICD-10-CM

## 2022-12-09 ENCOUNTER — TRANSCRIBE ORDERS (OUTPATIENT)
Dept: MAMMOGRAPHY | Facility: HOSPITAL | Age: 42
End: 2022-12-09

## 2022-12-09 DIAGNOSIS — R92.8 ABNORMAL MAMMOGRAM: Primary | ICD-10-CM

## 2022-12-19 ENCOUNTER — HOSPITAL ENCOUNTER (OUTPATIENT)
Dept: ULTRASOUND IMAGING | Facility: HOSPITAL | Age: 42
Discharge: HOME | End: 2022-12-19

## 2022-12-19 ENCOUNTER — HOSPITAL ENCOUNTER (OUTPATIENT)
Dept: MAMMOGRAPHY | Facility: HOSPITAL | Age: 42
Discharge: HOME | End: 2022-12-19

## 2022-12-19 ENCOUNTER — LAB REQUISITION (OUTPATIENT)
Dept: LAB | Facility: HOSPITAL | Age: 42
End: 2022-12-19

## 2022-12-19 ENCOUNTER — HOSPITAL ENCOUNTER (OUTPATIENT)
Dept: NUCLEAR MEDICINE | Facility: HOSPITAL | Age: 42
Discharge: HOME | End: 2022-12-19

## 2022-12-19 DIAGNOSIS — R92.8 ABNORMAL MAMMOGRAM: ICD-10-CM

## 2022-12-19 DIAGNOSIS — C50.411 MALIGNANT NEOPLASM OF UPPER-OUTER QUADRANT OF RIGHT FEMALE BREAST: ICD-10-CM

## 2022-12-19 DIAGNOSIS — C50.411 MALIGNANT NEOPLASM OF UPPER-OUTER QUADRANT OF RIGHT FEMALE BREAST, UNSPECIFIED ESTROGEN RECEPTOR STATUS: ICD-10-CM

## 2022-12-19 PROCEDURE — 77065 DX MAMMO INCL CAD UNI: CPT | Performed by: RADIOLOGY

## 2022-12-19 PROCEDURE — 88307 TISSUE EXAM BY PATHOLOGIST: CPT

## 2022-12-19 PROCEDURE — 76098 X-RAY EXAM SURGICAL SPECIMEN: CPT

## 2022-12-19 PROCEDURE — 0 TECHNETIUM FILTERED SULFUR COLLOID: Performed by: SURGERY

## 2022-12-19 PROCEDURE — A9541 TC99M SULFUR COLLOID: HCPCS | Performed by: SURGERY

## 2022-12-19 PROCEDURE — 76098 X-RAY EXAM SURGICAL SPECIMEN: CPT | Performed by: RADIOLOGY

## 2022-12-19 PROCEDURE — 19285 PERQ DEV BREAST 1ST US IMAG: CPT | Performed by: RADIOLOGY

## 2022-12-19 PROCEDURE — 38792 RA TRACER ID OF SENTINL NODE: CPT

## 2022-12-19 PROCEDURE — 0 LIDOCAINE 1 % SOLUTION: Performed by: RADIOLOGY

## 2022-12-19 PROCEDURE — C1819 TISSUE LOCALIZATION-EXCISION: HCPCS

## 2022-12-19 RX ORDER — LIDOCAINE HYDROCHLORIDE 10 MG/ML
5 INJECTION, SOLUTION INFILTRATION; PERINEURAL ONCE
Status: COMPLETED | OUTPATIENT
Start: 2022-12-19 | End: 2022-12-19

## 2022-12-19 RX ADMIN — Medication 1 ML: at 09:33

## 2022-12-19 RX ADMIN — TECHNETIUM TC 99M SULFUR COLLOID 1 DOSE: KIT at 14:06

## 2022-12-22 LAB — REF LAB TEST METHOD: NORMAL

## 2023-01-04 ENCOUNTER — HOSPITAL ENCOUNTER (OUTPATIENT)
Dept: RADIATION ONCOLOGY | Facility: HOSPITAL | Age: 43
Setting detail: RADIATION/ONCOLOGY SERIES
Discharge: HOME | End: 2023-01-04
Payer: COMMERCIAL

## 2023-01-04 DIAGNOSIS — G47.09 OTHER INSOMNIA: ICD-10-CM

## 2023-01-04 DIAGNOSIS — N80.9 ENDOMETRIOSIS: ICD-10-CM

## 2023-01-04 DIAGNOSIS — D50.8 IRON DEFICIENCY ANEMIA SECONDARY TO INADEQUATE DIETARY IRON INTAKE: ICD-10-CM

## 2023-01-04 RX ORDER — TRAZODONE HYDROCHLORIDE 100 MG/1
TABLET ORAL
Qty: 60 TABLET | Refills: 2 | Status: SHIPPED | OUTPATIENT
Start: 2023-01-04

## 2023-01-04 RX ORDER — DIPHENOXYLATE HYDROCHLORIDE AND ATROPINE SULFATE 2.5; .025 MG/1; MG/1
1 TABLET ORAL DAILY
Qty: 30 TABLET | Refills: 5 | Status: SHIPPED | OUTPATIENT
Start: 2023-01-04

## 2023-01-04 RX ORDER — FERROUS SULFATE 325(65) MG
325 TABLET ORAL
Qty: 30 TABLET | Refills: 5 | Status: SHIPPED | OUTPATIENT
Start: 2023-01-04

## 2023-01-04 RX ORDER — TRAMADOL HYDROCHLORIDE 50 MG/1
50 TABLET ORAL DAILY PRN
Qty: 30 TABLET | Refills: 0 | Status: SHIPPED | OUTPATIENT
Start: 2023-01-04 | End: 2023-02-14 | Stop reason: SDUPTHER

## 2023-01-04 NOTE — TELEPHONE ENCOUNTER
Caller: Cameron April Nickie    Relationship: Self    Best call back number: [phone]    Requested Prescriptions:   Requested Prescriptions     Pending Prescriptions Disp Refills   • ferrous sulfate 325 (65 FE) MG tablet 30 tablet 5     Sig: Take 1 tablet by mouth Daily With Breakfast. Take with orange juice or vitamin C   • multivitamin (Multiple Vitamin) tablet tablet 30 tablet 5     Sig: Take 1 tablet by mouth Daily.   • traMADol (ULTRAM) 50 MG tablet 30 tablet 2     Sig: Take 1 tablet by mouth Daily As Needed for Moderate Pain (pelvic pain).   • traZODone (DESYREL) 100 MG tablet 60 tablet 2     Sig: TAKE ONE TO TWO TABLETS BY MOUTH ONCE NIGHTLY 1 HOUR BEFORE BEDTIME AS NEEDED FOR SLEEP.        Pharmacy where request should be sent: [facility] [id number] [address] [facility] [phone] [facility] [phone] FX     Additional details provided by patient: PATIENT IS COMPLETELY OUT OF THESE MEDICATIONS AND HAS AN APPOINTMENT SCHEDULED FOR 01.23.23.    Does the patient have less than a 3 day supply:  [x] Yes  [] No    Would you like a call back once the refill request has been completed: [x] Yes [] No    If the office needs to give you a call back, can they leave a voicemail: [x] Yes [] No    Luiza Nichole Rep   01/04/23 09:41 EST

## 2023-01-06 ENCOUNTER — PATIENT OUTREACH (OUTPATIENT)
Dept: CASE MANAGEMENT | Facility: OTHER | Age: 43
End: 2023-01-06
Payer: COMMERCIAL

## 2023-01-06 NOTE — OUTREACH NOTE
AMBULATORY CASE MANAGEMENT NOTE    Name and Relationship of Patient/Support Person: Alena Barnes - Self    Patient Outreach    Role of ACM explained to patient, agreed to service.  Patient is a  who works third shift.  She is a single mother to an 8 year old daughter. Patient voices concern over being able to work during treatments.  Patient advised to discuss with Dr. Roach regarding physical limitations during treatment.  Patient states she will not take chemotherapy/hormonal therapy due to side effects.  Education provided and patient encouraged to keep open mind when talking with Dr. Gar. Education provided on eating a diet rich in nutrients and maintaining adequate hydration.  Patient voiced understanding.  Reviewed up-coming appointments with patient.  Denies SDOH at this time.      BELIA BRIGHT  Ambulatory Case Management    1/6/2023, 12:03 EST

## 2023-01-10 ENCOUNTER — OFFICE VISIT (OUTPATIENT)
Dept: RADIATION ONCOLOGY | Facility: HOSPITAL | Age: 43
End: 2023-01-10
Payer: COMMERCIAL

## 2023-01-10 VITALS
RESPIRATION RATE: 16 BRPM | SYSTOLIC BLOOD PRESSURE: 139 MMHG | HEART RATE: 65 BPM | OXYGEN SATURATION: 99 % | DIASTOLIC BLOOD PRESSURE: 85 MMHG | BODY MASS INDEX: 28.44 KG/M2 | WEIGHT: 181.2 LBS | HEIGHT: 67 IN | TEMPERATURE: 97.5 F

## 2023-01-10 DIAGNOSIS — C50.411 MALIGNANT NEOPLASM OF UPPER-OUTER QUADRANT OF RIGHT BREAST IN FEMALE, ESTROGEN RECEPTOR POSITIVE: Primary | ICD-10-CM

## 2023-01-10 DIAGNOSIS — C50.411 PRIMARY MALIGNANT NEOPLASM OF UPPER OUTER QUADRANT OF RIGHT FEMALE BREAST: ICD-10-CM

## 2023-01-10 DIAGNOSIS — Z17.0 MALIGNANT NEOPLASM OF UPPER-OUTER QUADRANT OF RIGHT BREAST IN FEMALE, ESTROGEN RECEPTOR POSITIVE: Primary | ICD-10-CM

## 2023-01-10 PROCEDURE — G0463 HOSPITAL OUTPT CLINIC VISIT: HCPCS | Performed by: RADIOLOGY

## 2023-01-10 NOTE — PROGRESS NOTES
CONSULTATION NOTE    NAME:      Alena Barnes  :                                                          1980  DATE OF CONSULTATION:                       01/10/23  REQUESTING PHYSICIAN:                   Honorio Noguera MD  REASON FOR CONSULTATION:            Cancer Staging   Primary malignant neoplasm of upper outer quadrant of right female breast (HCC)  Staging form: Breast, AJCC 8th Edition  - Clinical stage from 2021: cT1c - Unsigned  - Pathologic stage from 2021: Stage IA (pT1c, pN0(sn), cM0, G1, ER+, ME+, HER2-) - Signed by Ioana Best MD on 2021      Pathologic stage from 1/10/2023 T1c, pN0(sn) cM) grade 2 ER/ME positive HER2/megan negative         BRIEF HISTORY:  Alena Barnes  is a very pleasant 42 y.o. female who in May 2021 underwent right breast lumpectomy and sentinel node sampling.  Final pathology revealed invasive ductal carcinoma 14 mm in size.  0/1 sentinel node was positive for tumor.  Extended margins were negative.  Tumor was ER/ME positive HER-2/megan negative. Genetic testing was negative.    She refused adjuvant therapy  This year she was found to have a right breast mass at the 10 o'clock position measuring 1.4 cm biopsy positive for invasive ductal carcinoma ER/ME positive HER2/megan negative..  2022 she underwent right breast lumpectomy for invasive ductal carcinoma with features of significant renal cell carcinoma.  0/2 lymph nodes were positive for tumor.  Tumor was 1.2 x 1 right 0.8 cm.  The closest margin was 1.2 cm.  She is here with her mother today to discuss postoperative radiation.    Age at menarche:  8  Age at menopause:  n/a  Hormone replacement therapy:  no  Personal history of breast cancer:  yes  Family history of breast cancer:  yes; family member great great grandmother  Radiation to chest before age of 28:  no  Age of first live birth:  34    BMI:  Body mass index is 28.38 kg/m².      Social History     Substance and Sexual  Activity   Alcohol Use No       Allergies   Allergen Reactions   • Penicillins Hives       Social History     Tobacco Use   • Smoking status: Former     Packs/day: 0.50     Years: 15.00     Pack years: 7.50     Types: Cigarettes     Quit date:      Years since quitting: 3.0   • Smokeless tobacco: Never   Substance Use Topics   • Alcohol use: No   • Drug use: Never         Past Medical History:   Diagnosis Date   • Backache    • Blood in stool    • Endometriosis    • Gastritis    • Hemorrhoids    • Ingrown nail of great toe of left foot    • Malignant neoplasm of upper-outer quadrant of right breast in female, estrogen receptor positive (HCC) 2021   • Tinea        family history includes Alcohol abuse in her mother; Cancer in her mother; Fibrocystic breast disease in an other family member; Heart disease in her father; Mental illness in her mother; Osteoporosis in an other family member.     Past Surgical History:   Procedure Laterality Date   • BREAST LUMPECTOMY Right 2022    Rt Breast Lumpectomy - 21   •  SECTION     • OTHER SURGICAL HISTORY      treatment of lower leg fracture        Review of Systems   Constitutional:        Negative ROS   All other systems reviewed and are negative.          Objective   VITAL SIGNS:   Vitals:    01/10/23 1007   BP: 139/85   Pulse: 65   Resp: 16   Temp: 97.5 °F (36.4 °C)   TempSrc: Temporal   SpO2: 99%   Weight: 82.2 kg (181 lb 3.2 oz)   Height: 170.2 cm (67\")   PainSc: 0-No pain        KPS       90%    Physical Exam  Vitals and nursing note reviewed.   Constitutional:       Appearance: Normal appearance.   Cardiovascular:      Rate and Rhythm: Normal rate.   Neurological:      Mental Status: She is alert.     The breast exam reveals large breasts.  The right breast has a well-healing surgical incision in the right portion.  She has well-healing axillary incision.  She has ecchymosis in the medial portion inferiorly of the right breast.  She has a small  red follicular rash between the breasts.  The left breast is negative.  She has no axillary adenopathy bilaterally.         The following portions of the patient's history were reviewed and updated as appropriate: allergies, current medications, past family history, past medical history, past social history, past surgical history and problem list.    Assessment      IMPRESSION:    Alena Barnes  is 42 y.o. female who in May 2021 underwent right breast lumpectomy and sentinel node sampling for invasive ductal carcinoma 14 mm in size.  0/1 sentinel node was positive for tumor.  She refused adjuvant therapy.  This year she was found to have a right breast mass at the 10 o'clock position measuring 1.4 cm biopsy positive for invasive ductal carcinoma ER/AR positive HER2/megan negative..  12/19/2022 she underwent right breast lumpectomy for invasive ductal carcinoma with features of significant renal cell carcinoma.  0/2 lymph nodes were positive for tumor.  Tumor was 1.2 x 1 right 0.8 cm.  The closest margin was 1.2 cm.  She is here with her mother today to discuss postoperative radiation.      RECOMMENDATIONS: I recommend postoperative radiation of 45 Decker in 25 fractions and a tumor bed boost of 10 Gray in 5 fractions.  She has a breast too large for the hypofractionated course of treatment.  The pros and cons, risks and benefits of treatment were discussed and informed consent obtained.  She will return for treatment planning we will start shortly after.  Thank you for asking me to see Ms. Barnes.               Ioana Best MD    Total time of patient care on day of service including time prior to, face to face with patient, and following visit spent in reviewing records, lab results, imaging studies, discussion with patient, and documentation/charting was > 45 minutes.    Errors in dictation may reflect use of voice recognition software and not all errors in transcription may have been detected prior to  signing.

## 2023-01-12 ENCOUNTER — TELEPHONE (OUTPATIENT)
Dept: RADIATION ONCOLOGY | Facility: HOSPITAL | Age: 43
End: 2023-01-12
Payer: COMMERCIAL

## 2023-01-12 NOTE — TELEPHONE ENCOUNTER
Called patient to schedule CT Sim.  Patient requests a Monday or Tuesday appointment and will be out of town on 1/15/23 - 1/18/23.  Scheduled CT Sim for Tuesday, 1/24/23 at 10:00 am.  Patient will come at 9:45 am for a pregnancy test.  Patient verbalized date, time, & location of appointment and does want to ensure that she is out of CT Sim in time to attend Med/Onc appointment with Dr. Gar at 11:30 am.  Patient does not currently plan to complete Chemo if recommended, but does wish to complete XRT.  Contact info provided.

## 2023-01-24 ENCOUNTER — LAB (OUTPATIENT)
Dept: LAB | Facility: HOSPITAL | Age: 43
End: 2023-01-24
Payer: COMMERCIAL

## 2023-01-24 ENCOUNTER — CONSULT (OUTPATIENT)
Dept: ONCOLOGY | Facility: CLINIC | Age: 43
End: 2023-01-24
Payer: COMMERCIAL

## 2023-01-24 ENCOUNTER — HOSPITAL ENCOUNTER (OUTPATIENT)
Dept: RADIATION ONCOLOGY | Facility: HOSPITAL | Age: 43
Discharge: HOME | End: 2023-01-24

## 2023-01-24 VITALS
DIASTOLIC BLOOD PRESSURE: 85 MMHG | TEMPERATURE: 96.9 F | RESPIRATION RATE: 16 BRPM | OXYGEN SATURATION: 98 % | WEIGHT: 182 LBS | HEART RATE: 89 BPM | HEIGHT: 67 IN | BODY MASS INDEX: 28.56 KG/M2 | SYSTOLIC BLOOD PRESSURE: 133 MMHG

## 2023-01-24 DIAGNOSIS — C50.411 MALIGNANT NEOPLASM OF UPPER-OUTER QUADRANT OF RIGHT BREAST IN FEMALE, ESTROGEN RECEPTOR POSITIVE: ICD-10-CM

## 2023-01-24 DIAGNOSIS — N80.9 ENDOMETRIOSIS: Primary | ICD-10-CM

## 2023-01-24 DIAGNOSIS — Z17.0 MALIGNANT NEOPLASM OF UPPER-OUTER QUADRANT OF RIGHT BREAST IN FEMALE, ESTROGEN RECEPTOR POSITIVE: Primary | ICD-10-CM

## 2023-01-24 DIAGNOSIS — C50.411 MALIGNANT NEOPLASM OF UPPER-OUTER QUADRANT OF RIGHT BREAST IN FEMALE, ESTROGEN RECEPTOR POSITIVE: Primary | ICD-10-CM

## 2023-01-24 DIAGNOSIS — Z17.0 MALIGNANT NEOPLASM OF UPPER-OUTER QUADRANT OF RIGHT BREAST IN FEMALE, ESTROGEN RECEPTOR POSITIVE: ICD-10-CM

## 2023-01-24 LAB
ALBUMIN SERPL-MCNC: 4.5 G/DL (ref 3.5–5.2)
ALBUMIN/GLOB SERPL: 1.9 G/DL
ALP SERPL-CCNC: 72 U/L (ref 39–117)
ALT SERPL W P-5'-P-CCNC: 17 U/L (ref 1–33)
ANION GAP SERPL CALCULATED.3IONS-SCNC: 9 MMOL/L (ref 5–15)
AST SERPL-CCNC: 18 U/L (ref 1–32)
B-HCG UR QL: NEGATIVE
BASOPHILS # BLD AUTO: 0.04 10*3/MM3 (ref 0–0.2)
BASOPHILS NFR BLD AUTO: 0.6 % (ref 0–1.5)
BILIRUB SERPL-MCNC: 0.4 MG/DL (ref 0–1.2)
BUN SERPL-MCNC: 11 MG/DL (ref 6–20)
BUN/CREAT SERPL: 22.9 (ref 7–25)
CALCIUM SPEC-SCNC: 9.6 MG/DL (ref 8.6–10.5)
CHLORIDE SERPL-SCNC: 103 MMOL/L (ref 98–107)
CO2 SERPL-SCNC: 26 MMOL/L (ref 22–29)
CREAT SERPL-MCNC: 0.48 MG/DL (ref 0.57–1)
DEPRECATED RDW RBC AUTO: 41.5 FL (ref 37–54)
EGFRCR SERPLBLD CKD-EPI 2021: 119.2 ML/MIN/1.73
EOSINOPHIL # BLD AUTO: 0.05 10*3/MM3 (ref 0–0.4)
EOSINOPHIL NFR BLD AUTO: 0.7 % (ref 0.3–6.2)
ERYTHROCYTE [DISTWIDTH] IN BLOOD BY AUTOMATED COUNT: 11.7 % (ref 12.3–15.4)
GLOBULIN UR ELPH-MCNC: 2.4 GM/DL
GLUCOSE SERPL-MCNC: 85 MG/DL (ref 65–99)
HCT VFR BLD AUTO: 41.2 % (ref 34–46.6)
HGB BLD-MCNC: 13.6 G/DL (ref 12–15.9)
IMM GRANULOCYTES # BLD AUTO: 0.02 10*3/MM3 (ref 0–0.05)
IMM GRANULOCYTES NFR BLD AUTO: 0.3 % (ref 0–0.5)
LYMPHOCYTES # BLD AUTO: 1.88 10*3/MM3 (ref 0.7–3.1)
LYMPHOCYTES NFR BLD AUTO: 25.9 % (ref 19.6–45.3)
MCH RBC QN AUTO: 31.6 PG (ref 26.6–33)
MCHC RBC AUTO-ENTMCNC: 33 G/DL (ref 31.5–35.7)
MCV RBC AUTO: 95.6 FL (ref 79–97)
MONOCYTES # BLD AUTO: 0.52 10*3/MM3 (ref 0.1–0.9)
MONOCYTES NFR BLD AUTO: 7.2 % (ref 5–12)
NEUTROPHILS NFR BLD AUTO: 4.74 10*3/MM3 (ref 1.7–7)
NEUTROPHILS NFR BLD AUTO: 65.3 % (ref 42.7–76)
PLATELET # BLD AUTO: 205 10*3/MM3 (ref 140–450)
PMV BLD AUTO: 11.4 FL (ref 6–12)
POTASSIUM SERPL-SCNC: 4.2 MMOL/L (ref 3.5–5.2)
PROT SERPL-MCNC: 6.9 G/DL (ref 6–8.5)
RBC # BLD AUTO: 4.31 10*6/MM3 (ref 3.77–5.28)
SODIUM SERPL-SCNC: 138 MMOL/L (ref 136–145)
WBC NRBC COR # BLD: 7.25 10*3/MM3 (ref 3.4–10.8)

## 2023-01-24 PROCEDURE — 80053 COMPREHEN METABOLIC PANEL: CPT

## 2023-01-24 PROCEDURE — 99215 OFFICE O/P EST HI 40 MIN: CPT | Performed by: INTERNAL MEDICINE

## 2023-01-24 PROCEDURE — 36415 COLL VENOUS BLD VENIPUNCTURE: CPT

## 2023-01-24 PROCEDURE — 85025 COMPLETE CBC W/AUTO DIFF WBC: CPT

## 2023-01-24 PROCEDURE — 81025 URINE PREGNANCY TEST: CPT

## 2023-01-24 PROCEDURE — 77333 RADIATION TREATMENT AID(S): CPT | Performed by: RADIOLOGY

## 2023-01-24 PROCEDURE — 77290 THER RAD SIMULAJ FIELD CPLX: CPT | Performed by: RADIOLOGY

## 2023-01-24 NOTE — LETTER
2023     Honorio Noguera MD  [address] [facility] [address]  [place] [id number]    Patient: Alena Barnes   YOB: 1980   Date of Visit: 2023       Dear Dr. Poornima MD:    Thank you for referring Alena Barnes to me for evaluation. Below are the relevant portions of my assessment and plan of care.    If you have questions, please do not hesitate to call me. I look forward to following April along with you.         Sincerely,        Hali Gar MD        CC: No Recipients  Hail Gar MD  23 1910  Signed    Hematology and Oncology [place]  Office number [phone]    Fax number [phone]     New Patient Office Visit      Date: 2023     Patient Name: Alena Barnes  MRN: [id number]  : 1980    Referring Physician: Dr. Honorio Noguera    Chief Complaint: recurrent right breast cancer    Cancer Staging: recurrent IA      History of Present Illness: Alena Barnes is a pleasant 43 y.o. female who presents today for evaluation of recurrent right breast cancer. She is accompanied by her supportive mom.     She has a history of a grade two 1.4 cm stage Ia invasive ductal carcinoma of the right breast (ER 95%, ME 90%, HER2/megan negative) status post lumpectomy and sentinel lymph node biopsy (0/1) in May 2021.  Margins were negative by less than 1 mm. Oncotype was 25.  Patient declined all adjuvant therapy including radiation and endocrine therapy.    She was found to have a local recurrence and underwent repeat lumpectomy showing grade 2 invasive ductal carcinoma (ER 95%, ME 80%, and HER2/megan negative).  2 sentinel lymph nodes were negative.  Final margins were negative.  She is planning adjuvant radiation.     She initially tells me that she has no interest in chemotherapy or endocrine therapy.  On further exploration her main concern is experiencing permanent menopause.  She tells me that she works at a [employer] and  avoiding menopausal side effects is most important to her.    Past Medical History:   Past Medical History:   Diagnosis Date   • Backache    • Blood in stool    • Endometriosis    • Gastritis    • Hemorrhoids    • Ingrown nail of great toe of left foot    • Malignant neoplasm of upper-outer quadrant of right breast in female, estrogen receptor positive (HCC) 2021   • Tinea        Past Surgical History:   Past Surgical History:   Procedure Laterality Date   • BREAST LUMPECTOMY Right 2022    Rt Breast Lumpectomy - 21   •  SECTION     • OTHER SURGICAL HISTORY      treatment of lower leg fracture       Family History:   Family History   Problem Relation Age of Onset   • Alcohol abuse Mother    • Cancer Mother    • Mental illness Mother    • Heart disease Father    • Fibrocystic breast disease Other    • Osteoporosis Other    • Breast cancer Neg Hx    • Ovarian cancer Neg Hx        Social History:   Social History     Socioeconomic History   • Marital status: Legally    Tobacco Use   • Smoking status: Former     Packs/day: 0.50     Years: 15.00     Pack years: 7.50     Types: Cigarettes     Quit date:      Years since quitting: 3.0   • Smokeless tobacco: Never   Substance and Sexual Activity   • Alcohol use: No   • Drug use: Never   • Sexual activity: Yes     Partners: Male     Birth control/protection: Condom       Medications:     Current Outpatient Medications:   •  Ascorbic Acid (VITAMIN C GUMMIES PO), Take  by mouth., Disp: , Rfl:   •  busPIRone (BUSPAR) 5 MG tablet, TAKE ONE TO TWO TABLETS BY MOUTH THREE TIMES A DAY AS NEEDED FOR ANXIETY, Disp: 90 tablet, Rfl: 2  •  clonazePAM (KlonoPIN) 0.5 MG tablet, TAKE ONE TABLET BY MOUTH DAILY AS NEEDED FOR ANXIETY, Disp: 15 tablet, Rfl: 0  •  cyclobenzaprine (FLEXERIL) 5 MG tablet, Take 1 tablet by mouth 2 (Two) Times a Day As Needed for Muscle Spasms., Disp: 60 tablet, Rfl: 2  •  ferrous sulfate 325 (65 FE) MG tablet, Take 1 tablet by  mouth Daily With Breakfast. Take with orange juice or vitamin C, Disp: 30 tablet, Rfl: 5  •  fluconazole (DIFLUCAN) 150 MG tablet, Take one tablet by mouth and repeat in 3 days., Disp: 2 tablet, Rfl: 0  •  HYDROcodone-acetaminophen (NORCO) 5-325 MG per tablet, Take 1 tablet by mouth Every 8 (Eight) Hours As Needed., Disp: 7 tablet, Rfl: 0  •  hydrOXYzine (ATARAX) 25 MG tablet, Take 1 tablet by mouth Every 6 (Six) Hours As Needed for Anxiety., Disp: 60 tablet, Rfl: 2  •  ibuprofen (ADVIL,MOTRIN) 800 MG tablet, TAKE 1 TABLET BY MOUTH EVERY 6 HOURS AS NEEDED FOR MILD PAIN, Disp: 60 tablet, Rfl: 5  •  Multiple Vitamins-Minerals (WOMENS MULTIVITAMIN + COLLAGEN PO), Take  by mouth., Disp: , Rfl:   •  multivitamin (Multiple Vitamin) tablet tablet, Take 1 tablet by mouth Daily., Disp: 30 tablet, Rfl: 5  •  omeprazole (priLOSEC) 40 MG capsule, Take 1 capsule by mouth Daily., Disp: 90 capsule, Rfl: 3  •  ondansetron ODT (ZOFRAN-ODT) 8 MG disintegrating tablet, Take 1/2 to 1 tablet by mouth (dissolve under tongue or swallow) every 8 hours as needed for nausea., Disp: 30 tablet, Rfl: 1  •  traMADol (ULTRAM) 50 MG tablet, Take 1 tablet by mouth Daily As Needed for Moderate Pain (pelvic pain)., Disp: 30 tablet, Rfl: 0  •  traZODone (DESYREL) 100 MG tablet, TAKE ONE TO TWO TABLETS BY MOUTH ONCE NIGHTLY 1 HOUR BEFORE BEDTIME AS NEEDED FOR SLEEP., Disp: 60 tablet, Rfl: 2  •  Vitamin A 3 MG (85804 UT) capsule, Take 10,000 Units by mouth Daily., Disp: , Rfl:   •  vitamin D (ERGOCALCIFEROL) 1.25 MG (68297 UT) capsule capsule, Take 1 capsule by mouth 1 (One) Time Per Week., Disp: 4 capsule, Rfl: 2  •  vitamin E 200 UNIT capsule, Take 200 Units by mouth Daily., Disp: , Rfl:     Allergies:   Allergies   Allergen Reactions   • Penicillins Hives       Objective     Vital Signs:   Vitals:    01/24/23 1151   BP: 133/85   Pulse: 89   Resp: 16   Temp: 96.9 °F (36.1 °C)   TempSrc: Infrared   SpO2: 98%   Weight: 82.6 kg (182 lb)   Height: 170.2  "cm (67.01\")   PainSc: 0-No pain    Body mass index is 28.5 kg/m².   Pain Score    01/24/23 1151   PainSc: 0-No pain       ECOG Performance Status: 0    Physical Exam:   General: No acute distress. Well appearing   HEENT: Normocephalic, atraumatic. Sclera anicteric. Masked.  Neck: supple, no adenopathy.   Cardiovascular: regular rate and rhythm,. No murmurs.   Respiratory: Normal rate. Clear to auscultation bilaterally  Abdomen: Soft, nontender, non distended with normoactive bowel sounds  Lymph: no cervical, supraclavicular or axillary adenopathy  Neuro: Alert and oriented x 3. No focal deficits.   Ext: Symmetric, no swelling.   Psych: Euthymic      Laboratory/Imaging Reviewed:   Pathology from 2021, 2022 and radiology imaging from 2022 reviewed as outlined in HPI.    Assessment / Plan      Assessment/Plan:       1. Malignant neoplasm of upper-outer quadrant of right breast in female, estrogen receptor positive  -I reviewed her imaging and pathology as outlined in the HPI.  -She has a recurrent right breast cancer that is ER positive and HER2 negative following lumpectomy with omission of adjuvant radiation, chemotherapy, and endocrine therapy.  She is willing to proceed with adjuvant radiation, but declines adjuvant chemotherapy or repeat Oncotype testing.  Initially she also declined endocrine therapy.  We discussed the increased risk of metastasis and breast cancer related mortality with the omission of adjuvant systemic therapy.  We discussed various options for endocrine therapy including ovarian suppression and an aromatase inhibitor which would render her postmenopausal, or trial of tamoxifen, which while sometimes associated with menopausal symptoms, is a partial estrogen receptor agonist and antagonist and does not render a patient postmenopausal.  -We discussed multiple potential quality of life side effects on tamoxifen as well as the potential to discontinue tamoxifen if she experiences intolerable side " effects.  Specifically, we discussed the risk for serious or fatal venous thromboembolism, hot flashes, muscle symptoms, hepatotoxicity, vaginal dryness and cataracts. We discussed the risk for endometrial hyperplasia/malignancies.  She should have annual gynecology exams and seek attention for any abnormal vaginal bleeding. She should avoid pregnancy due to teratogenicity.  At the end of our discussion, she is willing to consider a trial of endocrine therapy further and will meet back with me in 1 month for further discussion.  -Given that this is a recurrence of her initial disease I recommended complete staging evaluation with a CT chest abdomen pelvis and bone scan, to which she is amenable.    Follow Up:   1 - 2 mo     Hali Gar MD  Hematology and Oncology     I have spent a total of 45 min on reviewing test results/preparing to see patient, counseling patient, performing medically appropriate exam and documenting clinical information in the electronic or other health record

## 2023-01-24 NOTE — PROGRESS NOTES
Hematology and Oncology [place]  Office number [phone]    Fax number [phone]     New Patient Office Visit      Date: 2023     Patient Name: Alena Barnes  MRN: [id number]  : 1980    Referring Physician: Dr. Honorio Noguera    Chief Complaint: recurrent right breast cancer    Cancer Staging: recurrent IA      History of Present Illness: Alena Barnes is a pleasant 43 y.o. female who presents today for evaluation of recurrent right breast cancer. She is accompanied by her supportive mom.     She has a history of a grade two 1.4 cm stage Ia invasive ductal carcinoma of the right breast (ER 95%, NJ 90%, HER2/megan negative) status post lumpectomy and sentinel lymph node biopsy () in May 2021.  Margins were negative by less than 1 mm. Oncotype was 25.  Patient declined all adjuvant therapy including radiation and endocrine therapy.    She was found to have a local recurrence and underwent repeat lumpectomy showing grade 2 invasive ductal carcinoma (ER 95%, NJ 80%, and HER2/megan negative).  2 sentinel lymph nodes were negative.  Final margins were negative.  She is planning adjuvant radiation.     She initially tells me that she has no interest in chemotherapy or endocrine therapy.  On further exploration her main concern is experiencing permanent menopause.  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    Past Medical History:   Past Medical History:   Diagnosis Date   • Backache    • Blood in stool    • Endometriosis    • Gastritis    • Hemorrhoids    • Ingrown nail of great toe of left foot    • Malignant neoplasm of upper-outer quadrant of right breast in female, estrogen receptor positive (HCC) 2021   • Tinea        Past Surgical History:   Past Surgical History:   Procedure Laterality Date   • BREAST LUMPECTOMY Right 2022    Rt Breast Lumpectomy - 21   •  SECTION     • OTHER SURGICAL  HISTORY      treatment of lower leg fracture       Family History:   Family History   Problem Relation Age of Onset   • Alcohol abuse Mother    • Cancer Mother    • Mental illness Mother    • Heart disease Father    • Fibrocystic breast disease Other    • Osteoporosis Other    • Breast cancer Neg Hx    • Ovarian cancer Neg Hx        Social History:   Social History     Socioeconomic History   • Marital status: Legally    Tobacco Use   • Smoking status: Former     Packs/day: 0.50     Years: 15.00     Pack years: 7.50     Types: Cigarettes     Quit date: 2020     Years since quitting: 3.0   • Smokeless tobacco: Never   Substance and Sexual Activity   • Alcohol use: No   • Drug use: Never   • Sexual activity: Yes     Partners: Male     Birth control/protection: Condom       Medications:     Current Outpatient Medications:   •  Ascorbic Acid (VITAMIN C GUMMIES PO), Take  by mouth., Disp: , Rfl:   •  busPIRone (BUSPAR) 5 MG tablet, TAKE ONE TO TWO TABLETS BY MOUTH THREE TIMES A DAY AS NEEDED FOR ANXIETY, Disp: 90 tablet, Rfl: 2  •  clonazePAM (KlonoPIN) 0.5 MG tablet, TAKE ONE TABLET BY MOUTH DAILY AS NEEDED FOR ANXIETY, Disp: 15 tablet, Rfl: 0  •  cyclobenzaprine (FLEXERIL) 5 MG tablet, Take 1 tablet by mouth 2 (Two) Times a Day As Needed for Muscle Spasms., Disp: 60 tablet, Rfl: 2  •  ferrous sulfate 325 (65 FE) MG tablet, Take 1 tablet by mouth Daily With Breakfast. Take with orange juice or vitamin C, Disp: 30 tablet, Rfl: 5  •  fluconazole (DIFLUCAN) 150 MG tablet, Take one tablet by mouth and repeat in 3 days., Disp: 2 tablet, Rfl: 0  •  HYDROcodone-acetaminophen (NORCO) 5-325 MG per tablet, Take 1 tablet by mouth Every 8 (Eight) Hours As Needed., Disp: 7 tablet, Rfl: 0  •  hydrOXYzine (ATARAX) 25 MG tablet, Take 1 tablet by mouth Every 6 (Six) Hours As Needed for Anxiety., Disp: 60 tablet, Rfl: 2  •  ibuprofen (ADVIL,MOTRIN) 800 MG tablet, TAKE 1 TABLET BY MOUTH EVERY 6 HOURS AS NEEDED FOR MILD PAIN,  "Disp: 60 tablet, Rfl: 5  •  Multiple Vitamins-Minerals (WOMENS MULTIVITAMIN + COLLAGEN PO), Take  by mouth., Disp: , Rfl:   •  multivitamin (Multiple Vitamin) tablet tablet, Take 1 tablet by mouth Daily., Disp: 30 tablet, Rfl: 5  •  omeprazole (priLOSEC) 40 MG capsule, Take 1 capsule by mouth Daily., Disp: 90 capsule, Rfl: 3  •  ondansetron ODT (ZOFRAN-ODT) 8 MG disintegrating tablet, Take 1/2 to 1 tablet by mouth (dissolve under tongue or swallow) every 8 hours as needed for nausea., Disp: 30 tablet, Rfl: 1  •  traMADol (ULTRAM) 50 MG tablet, Take 1 tablet by mouth Daily As Needed for Moderate Pain (pelvic pain)., Disp: 30 tablet, Rfl: 0  •  traZODone (DESYREL) 100 MG tablet, TAKE ONE TO TWO TABLETS BY MOUTH ONCE NIGHTLY 1 HOUR BEFORE BEDTIME AS NEEDED FOR SLEEP., Disp: 60 tablet, Rfl: 2  •  Vitamin A 3 MG (04997 UT) capsule, Take 10,000 Units by mouth Daily., Disp: , Rfl:   •  vitamin D (ERGOCALCIFEROL) 1.25 MG (62906 UT) capsule capsule, Take 1 capsule by mouth 1 (One) Time Per Week., Disp: 4 capsule, Rfl: 2  •  vitamin E 200 UNIT capsule, Take 200 Units by mouth Daily., Disp: , Rfl:     Allergies:   Allergies   Allergen Reactions   • Penicillins Hives       Objective     Vital Signs:   Vitals:    01/24/23 1151   BP: 133/85   Pulse: 89   Resp: 16   Temp: 96.9 °F (36.1 °C)   TempSrc: Infrared   SpO2: 98%   Weight: 82.6 kg (182 lb)   Height: 170.2 cm (67.01\")   PainSc: 0-No pain    Body mass index is 28.5 kg/m².   Pain Score    01/24/23 1151   PainSc: 0-No pain       ECOG Performance Status: 0    Physical Exam:   General: No acute distress. Well appearing   HEENT: Normocephalic, atraumatic. Sclera anicteric. Masked.  Neck: supple, no adenopathy.   Cardiovascular: regular rate and rhythm,. No murmurs.   Respiratory: Normal rate. Clear to auscultation bilaterally  Abdomen: Soft, nontender, non distended with normoactive bowel sounds  Lymph: no cervical, supraclavicular or axillary adenopathy  Neuro: Alert and " oriented x 3. No focal deficits.   Ext: Symmetric, no swelling.   Psych: Euthymic      Laboratory/Imaging Reviewed:   Pathology from 2021, 2022 and radiology imaging from 2022 reviewed as outlined in HPI.    Assessment / Plan      Assessment/Plan:       1. Malignant neoplasm of upper-outer quadrant of right breast in female, estrogen receptor positive  -I reviewed her imaging and pathology as outlined in the HPI.  -She has a recurrent right breast cancer that is ER positive and HER2 negative following lumpectomy with omission of adjuvant radiation, chemotherapy, and endocrine therapy.  She is willing to proceed with adjuvant radiation, but declines adjuvant chemotherapy or repeat Oncotype testing.  Initially she also declined endocrine therapy.  We discussed the increased risk of metastasis and breast cancer related mortality with the omission of adjuvant systemic therapy.  We discussed various options for endocrine therapy including ovarian suppression and an aromatase inhibitor which would render her postmenopausal, or trial of tamoxifen, which while sometimes associated with menopausal symptoms, is a partial estrogen receptor agonist and antagonist and does not render a patient postmenopausal.  -We discussed multiple potential quality of life side effects on tamoxifen as well as the potential to discontinue tamoxifen if she experiences intolerable side effects.  Specifically, we discussed the risk for serious or fatal venous thromboembolism, hot flashes, muscle symptoms, hepatotoxicity, vaginal dryness and cataracts. We discussed the risk for endometrial hyperplasia/malignancies.  She should have annual gynecology exams and seek attention for any abnormal vaginal bleeding. She should avoid pregnancy due to teratogenicity.  At the end of our discussion, she is willing to consider a trial of endocrine therapy further and will meet back with me in 1 month for further discussion.  -Given that this is a recurrence of  her initial disease I recommended complete staging evaluation with a CT chest abdomen pelvis and bone scan, to which she is amenable.    Follow Up:   1 - 2 mo     Hali Gar MD  Hematology and Oncology     I have spent a total of 45 min on reviewing test results/preparing to see patient, counseling patient, performing medically appropriate exam and documenting clinical information in the electronic or other health record

## 2023-01-27 ENCOUNTER — PATIENT ROUNDING (BHMG ONLY) (OUTPATIENT)
Dept: ONCOLOGY | Facility: CLINIC | Age: 43
End: 2023-01-27
Payer: COMMERCIAL

## 2023-01-27 PROCEDURE — 77334 RADIATION TREATMENT AID(S): CPT | Performed by: RADIOLOGY

## 2023-01-27 PROCEDURE — 77300 RADIATION THERAPY DOSE PLAN: CPT | Performed by: RADIOLOGY

## 2023-01-27 PROCEDURE — 77295 3-D RADIOTHERAPY PLAN: CPT | Performed by: RADIOLOGY

## 2023-01-27 NOTE — PROGRESS NOTES
A My-Chart message has been sent to the patient for PATIENT ROUNDING with [facility].

## 2023-02-06 ENCOUNTER — HOSPITAL ENCOUNTER (OUTPATIENT)
Dept: RADIATION ONCOLOGY | Facility: HOSPITAL | Age: 43
Setting detail: RADIATION/ONCOLOGY SERIES
Discharge: HOME | End: 2023-02-06
Payer: COMMERCIAL

## 2023-02-06 ENCOUNTER — HOSPITAL ENCOUNTER (OUTPATIENT)
Dept: RADIATION ONCOLOGY | Facility: HOSPITAL | Age: 43
Discharge: HOME | End: 2023-02-06

## 2023-02-06 PROCEDURE — 77280 THER RAD SIMULAJ FIELD SMPL: CPT | Performed by: RADIOLOGY

## 2023-02-07 ENCOUNTER — HOSPITAL ENCOUNTER (OUTPATIENT)
Dept: RADIATION ONCOLOGY | Facility: HOSPITAL | Age: 43
Discharge: HOME | End: 2023-02-07

## 2023-02-07 VITALS — BODY MASS INDEX: 28.19 KG/M2 | WEIGHT: 180 LBS

## 2023-02-07 PROCEDURE — 77412 RADIATION TX DELIVERY LVL 3: CPT | Performed by: RADIOLOGY

## 2023-02-08 ENCOUNTER — HOSPITAL ENCOUNTER (OUTPATIENT)
Dept: RADIATION ONCOLOGY | Facility: HOSPITAL | Age: 43
Discharge: HOME | End: 2023-02-08

## 2023-02-08 PROCEDURE — 77412 RADIATION TX DELIVERY LVL 3: CPT | Performed by: RADIOLOGY

## 2023-02-09 ENCOUNTER — APPOINTMENT (OUTPATIENT)
Dept: NUCLEAR MEDICINE | Facility: HOSPITAL | Age: 43
End: 2023-02-09
Payer: COMMERCIAL

## 2023-02-09 ENCOUNTER — HOSPITAL ENCOUNTER (OUTPATIENT)
Dept: RADIATION ONCOLOGY | Facility: HOSPITAL | Age: 43
Discharge: HOME | End: 2023-02-09

## 2023-02-09 PROCEDURE — 77412 RADIATION TX DELIVERY LVL 3: CPT | Performed by: RADIOLOGY

## 2023-02-09 PROCEDURE — 77336 RADIATION PHYSICS CONSULT: CPT | Performed by: RADIOLOGY

## 2023-02-10 ENCOUNTER — HOSPITAL ENCOUNTER (OUTPATIENT)
Dept: RADIATION ONCOLOGY | Facility: HOSPITAL | Age: 43
Discharge: HOME | End: 2023-02-10

## 2023-02-10 ENCOUNTER — PATIENT OUTREACH (OUTPATIENT)
Dept: CASE MANAGEMENT | Facility: OTHER | Age: 43
End: 2023-02-10
Payer: COMMERCIAL

## 2023-02-10 PROCEDURE — 77412 RADIATION TX DELIVERY LVL 3: CPT | Performed by: RADIOLOGY

## 2023-02-10 NOTE — OUTREACH NOTE
AMBULATORY CASE MANAGEMENT NOTE    Name and Relationship of Patient/Support Person: Cameron April Nickie - Self    Patient Outreach    Patient states radiation treatment went well today.  Denies symptoms or complaints.  Patient stated she would like to get back in with behavorial health, last appointment 2021.  Patient advised to call office for appointment, if new referral needed ACM can assist or she can call PCP for referral.  SDOH reviewed, patient is single mother and often needs assistance.  Application to [facility] application submitted.    Education Documentation  No documentation found.        Mariza BRIGHT  Ambulatory Case Management    2/10/2023, 14:37 EST

## 2023-02-13 ENCOUNTER — HOSPITAL ENCOUNTER (OUTPATIENT)
Dept: NUCLEAR MEDICINE | Facility: HOSPITAL | Age: 43
Discharge: HOME | End: 2023-02-13
Payer: COMMERCIAL

## 2023-02-13 ENCOUNTER — HOSPITAL ENCOUNTER (OUTPATIENT)
Dept: RADIATION ONCOLOGY | Facility: HOSPITAL | Age: 43
Discharge: HOME | End: 2023-02-13

## 2023-02-13 ENCOUNTER — HOSPITAL ENCOUNTER (OUTPATIENT)
Dept: CT IMAGING | Facility: HOSPITAL | Age: 43
Discharge: HOME | End: 2023-02-13
Admitting: INTERNAL MEDICINE
Payer: COMMERCIAL

## 2023-02-13 DIAGNOSIS — C50.411 MALIGNANT NEOPLASM OF UPPER-OUTER QUADRANT OF RIGHT BREAST IN FEMALE, ESTROGEN RECEPTOR POSITIVE: ICD-10-CM

## 2023-02-13 DIAGNOSIS — Z17.0 MALIGNANT NEOPLASM OF UPPER-OUTER QUADRANT OF RIGHT BREAST IN FEMALE, ESTROGEN RECEPTOR POSITIVE: ICD-10-CM

## 2023-02-13 PROCEDURE — A9503 TC99M MEDRONATE: HCPCS | Performed by: INTERNAL MEDICINE

## 2023-02-13 PROCEDURE — 25010000002 IOPAMIDOL 61 % SOLUTION: Performed by: INTERNAL MEDICINE

## 2023-02-13 PROCEDURE — 0 TECHNETIUM MEDRONATE KIT: Performed by: INTERNAL MEDICINE

## 2023-02-13 PROCEDURE — 74177 CT ABD & PELVIS W/CONTRAST: CPT

## 2023-02-13 PROCEDURE — 77412 RADIATION TX DELIVERY LVL 3: CPT | Performed by: RADIOLOGY

## 2023-02-13 PROCEDURE — 77417 THER RADIOLOGY PORT IMAGE(S): CPT | Performed by: RADIOLOGY

## 2023-02-13 PROCEDURE — 71260 CT THORAX DX C+: CPT

## 2023-02-13 PROCEDURE — 78306 BONE IMAGING WHOLE BODY: CPT

## 2023-02-13 RX ORDER — TC 99M MEDRONATE 20 MG/10ML
26.7 INJECTION, POWDER, LYOPHILIZED, FOR SOLUTION INTRAVENOUS
Status: COMPLETED | OUTPATIENT
Start: 2023-02-13 | End: 2023-02-13

## 2023-02-13 RX ADMIN — IOPAMIDOL 95 ML: 612 INJECTION, SOLUTION INTRAVENOUS at 11:21

## 2023-02-13 RX ADMIN — Medication 26.7 MILLICURIE: at 10:33

## 2023-02-14 ENCOUNTER — OFFICE VISIT (OUTPATIENT)
Dept: INTERNAL MEDICINE | Facility: CLINIC | Age: 43
End: 2023-02-14
Payer: COMMERCIAL

## 2023-02-14 ENCOUNTER — HOSPITAL ENCOUNTER (OUTPATIENT)
Dept: RADIATION ONCOLOGY | Facility: HOSPITAL | Age: 43
Discharge: HOME | End: 2023-02-14

## 2023-02-14 VITALS
SYSTOLIC BLOOD PRESSURE: 128 MMHG | WEIGHT: 184 LBS | DIASTOLIC BLOOD PRESSURE: 82 MMHG | TEMPERATURE: 97.7 F | OXYGEN SATURATION: 98 % | BODY MASS INDEX: 28.88 KG/M2 | RESPIRATION RATE: 16 BRPM | HEIGHT: 67 IN | HEART RATE: 69 BPM

## 2023-02-14 VITALS — WEIGHT: 183.5 LBS | BODY MASS INDEX: 28.73 KG/M2

## 2023-02-14 DIAGNOSIS — Z79.899 MEDICATION MANAGEMENT: ICD-10-CM

## 2023-02-14 DIAGNOSIS — M54.41 CHRONIC BILATERAL LOW BACK PAIN WITH BILATERAL SCIATICA: ICD-10-CM

## 2023-02-14 DIAGNOSIS — Z12.11 SCREEN FOR COLON CANCER: ICD-10-CM

## 2023-02-14 DIAGNOSIS — C50.411 MALIGNANT NEOPLASM OF UPPER-OUTER QUADRANT OF RIGHT BREAST IN FEMALE, ESTROGEN RECEPTOR POSITIVE: ICD-10-CM

## 2023-02-14 DIAGNOSIS — M54.42 CHRONIC BILATERAL LOW BACK PAIN WITH BILATERAL SCIATICA: ICD-10-CM

## 2023-02-14 DIAGNOSIS — N80.9 ENDOMETRIOSIS: ICD-10-CM

## 2023-02-14 DIAGNOSIS — Z17.0 MALIGNANT NEOPLASM OF UPPER-OUTER QUADRANT OF RIGHT BREAST IN FEMALE, ESTROGEN RECEPTOR POSITIVE: ICD-10-CM

## 2023-02-14 DIAGNOSIS — G89.29 CHRONIC BILATERAL LOW BACK PAIN WITH BILATERAL SCIATICA: ICD-10-CM

## 2023-02-14 DIAGNOSIS — F41.9 ANXIETY: Primary | ICD-10-CM

## 2023-02-14 PROCEDURE — 99214 OFFICE O/P EST MOD 30 MIN: CPT | Performed by: NURSE PRACTITIONER

## 2023-02-14 PROCEDURE — 77412 RADIATION TX DELIVERY LVL 3: CPT | Performed by: RADIOLOGY

## 2023-02-14 RX ORDER — GABAPENTIN 300 MG/1
300 CAPSULE ORAL 2 TIMES DAILY PRN
Qty: 30 CAPSULE | Refills: 0 | Status: SHIPPED | OUTPATIENT
Start: 2023-02-14

## 2023-02-14 RX ORDER — TRAMADOL HYDROCHLORIDE 50 MG/1
50 TABLET ORAL DAILY PRN
Qty: 60 TABLET | Refills: 2 | Status: SHIPPED | OUTPATIENT
Start: 2023-02-14

## 2023-02-15 ENCOUNTER — HOSPITAL ENCOUNTER (OUTPATIENT)
Dept: RADIATION ONCOLOGY | Facility: HOSPITAL | Age: 43
Discharge: HOME | End: 2023-02-15

## 2023-02-15 PROCEDURE — 77412 RADIATION TX DELIVERY LVL 3: CPT | Performed by: RADIOLOGY

## 2023-02-16 ENCOUNTER — HOSPITAL ENCOUNTER (OUTPATIENT)
Dept: RADIATION ONCOLOGY | Facility: HOSPITAL | Age: 43
Discharge: HOME | End: 2023-02-16

## 2023-02-16 PROCEDURE — 77412 RADIATION TX DELIVERY LVL 3: CPT | Performed by: RADIOLOGY

## 2023-02-17 ENCOUNTER — HOSPITAL ENCOUNTER (OUTPATIENT)
Dept: RADIATION ONCOLOGY | Facility: HOSPITAL | Age: 43
Discharge: HOME | End: 2023-02-17

## 2023-02-17 PROCEDURE — 77336 RADIATION PHYSICS CONSULT: CPT | Performed by: RADIOLOGY

## 2023-02-17 PROCEDURE — 77412 RADIATION TX DELIVERY LVL 3: CPT | Performed by: RADIOLOGY

## 2023-02-20 ENCOUNTER — HOSPITAL ENCOUNTER (OUTPATIENT)
Dept: RADIATION ONCOLOGY | Facility: HOSPITAL | Age: 43
Discharge: HOME | End: 2023-02-20

## 2023-02-20 PROCEDURE — 77412 RADIATION TX DELIVERY LVL 3: CPT | Performed by: RADIOLOGY

## 2023-02-20 PROCEDURE — 77417 THER RADIOLOGY PORT IMAGE(S): CPT | Performed by: RADIOLOGY

## 2023-02-21 ENCOUNTER — LAB (OUTPATIENT)
Dept: LAB | Facility: HOSPITAL | Age: 43
End: 2023-02-21
Payer: COMMERCIAL

## 2023-02-21 ENCOUNTER — OFFICE VISIT (OUTPATIENT)
Dept: ONCOLOGY | Facility: CLINIC | Age: 43
End: 2023-02-21
Payer: COMMERCIAL

## 2023-02-21 ENCOUNTER — HOSPITAL ENCOUNTER (OUTPATIENT)
Dept: RADIATION ONCOLOGY | Facility: HOSPITAL | Age: 43
Discharge: HOME | End: 2023-02-21

## 2023-02-21 VITALS
DIASTOLIC BLOOD PRESSURE: 75 MMHG | SYSTOLIC BLOOD PRESSURE: 122 MMHG | TEMPERATURE: 97.3 F | HEIGHT: 67 IN | OXYGEN SATURATION: 99 % | HEART RATE: 53 BPM | WEIGHT: 185 LBS | BODY MASS INDEX: 29.03 KG/M2 | RESPIRATION RATE: 16 BRPM

## 2023-02-21 VITALS — BODY MASS INDEX: 28.95 KG/M2 | WEIGHT: 184.9 LBS

## 2023-02-21 DIAGNOSIS — Z17.0 MALIGNANT NEOPLASM OF UPPER-OUTER QUADRANT OF RIGHT BREAST IN FEMALE, ESTROGEN RECEPTOR POSITIVE: Primary | ICD-10-CM

## 2023-02-21 DIAGNOSIS — C50.411 MALIGNANT NEOPLASM OF UPPER-OUTER QUADRANT OF RIGHT BREAST IN FEMALE, ESTROGEN RECEPTOR POSITIVE: ICD-10-CM

## 2023-02-21 DIAGNOSIS — Z17.0 MALIGNANT NEOPLASM OF UPPER-OUTER QUADRANT OF RIGHT BREAST IN FEMALE, ESTROGEN RECEPTOR POSITIVE: ICD-10-CM

## 2023-02-21 DIAGNOSIS — C50.411 MALIGNANT NEOPLASM OF UPPER-OUTER QUADRANT OF RIGHT BREAST IN FEMALE, ESTROGEN RECEPTOR POSITIVE: Primary | ICD-10-CM

## 2023-02-21 LAB
B-HCG UR QL: NEGATIVE
DRUGS UR: NORMAL

## 2023-02-21 PROCEDURE — 99214 OFFICE O/P EST MOD 30 MIN: CPT | Performed by: INTERNAL MEDICINE

## 2023-02-21 PROCEDURE — 81025 URINE PREGNANCY TEST: CPT

## 2023-02-21 PROCEDURE — 77412 RADIATION TX DELIVERY LVL 3: CPT | Performed by: RADIOLOGY

## 2023-02-21 RX ORDER — TAMOXIFEN CITRATE 10 MG/1
10 TABLET ORAL DAILY
Qty: 30 TABLET | Refills: 1 | Status: SHIPPED | OUTPATIENT
Start: 2023-02-21

## 2023-02-21 NOTE — PROGRESS NOTES
Hematology and Oncology [place]  Office number [phone]    Fax number [phone]     Follow up     Date: 2023     Patient Name: Alena Barnes  MRN: [id number]  : 1980    Referring Physician: Dr. Honorio Noguera    Chief Complaint: recurrent right breast cancer    Cancer Staging: recurrent IA      History of Present Illness: Alena Barnes is a pleasant 43 y.o. female who presents today for evaluation of recurrent right breast cancer. She is accompanied by her supportive mom.     She has a history of a grade two 1.4 cm stage Ia invasive ductal carcinoma of the right breast (ER 95%, AL 90%, HER2/megan negative) status post lumpectomy and sentinel lymph node biopsy (0) in May 2021.  Margins were negative by less than 1 mm. Oncotype was 25.  Patient declined all adjuvant therapy including radiation and endocrine therapy.    She was found to have a local recurrence and underwent repeat lumpectomy showing grade 2 invasive ductal carcinoma (ER 95%, AL 80%, and HER2/megan negative).  2 sentinel lymph nodes were negative.  Final margins were negative.  She is planning adjuvant radiation.     She initially tells me that she has no interest in chemotherapy or endocrine therapy.  On further exploration her main concern is experiencing permanent menopause.  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    Interval history:  She returns for follow-up and test results as well as discussion regarding adjuvant therapy options.  Since her last visit she underwent a metastatic work-up as follows:  CT chest showed 3.5 cm right breast mass and mildly prominent right axillary LN. Indeterminate sclerotic lesions in the right acetabulum and left ilium. Bone scan negative and acetabular changes felt to be stable since 2016.   She is tolerating radiation well overall.  She does endorse cumulative fatigue that is worse on .  She  also notes bilateral breast tenderness that she feels is similar to pregnancy.  She is sexually active.  Last menstrual period was 1 week ago.  Radiation to complete 3/20/2023  She has considered further and wishes to proceed with a trial of adjuvant tamoxifen.  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  However, she is motivated to lower her risk for breast cancer recurrence/metastasis and would like to consider a trial of low-dose tamoxifen 10 mg with escalation to full dose as tolerated on completion of radiation.    Past Medical History:   Past Medical History:   Diagnosis Date   • Backache    • Blood in stool    • Endometriosis    • Gastritis    • Hemorrhoids    • Ingrown nail of great toe of left foot    • Malignant neoplasm of upper-outer quadrant of right breast in female, estrogen receptor positive (HCC) 2021   • Tinea        Past Surgical History:   Past Surgical History:   Procedure Laterality Date   • BREAST LUMPECTOMY Right 2022    Rt Breast Lumpectomy - 21   •  SECTION     • OTHER SURGICAL HISTORY      treatment of lower leg fracture       Family History:   Family History   Problem Relation Age of Onset   • Alcohol abuse Mother    • Cancer Mother    • Mental illness Mother    • Heart disease Father    • Fibrocystic breast disease Other    • Osteoporosis Other    • Breast cancer Neg Hx    • Ovarian cancer Neg Hx        Social History:   Social History     Socioeconomic History   • Marital status: Legally    Tobacco Use   • Smoking status: Former     Packs/day: 0.50     Years: 15.00     Pack years: 7.50     Types: Cigarettes     Quit date: 2020     Years since quitting: 3.1   • Smokeless tobacco: Never   Substance and Sexual Activity   • Alcohol use: No   • Drug use: Never   • Sexual activity: Yes     Partners:  Male     Birth control/protection: Condom       Medications:     Current Outpatient Medications:   •  Ascorbic Acid (VITAMIN C GUMMIES PO), Take  by mouth., Disp: , Rfl:   •  busPIRone (BUSPAR) 5 MG tablet, TAKE ONE TO TWO TABLETS BY MOUTH THREE TIMES A DAY AS NEEDED FOR ANXIETY, Disp: 90 tablet, Rfl: 2  •  cyclobenzaprine (FLEXERIL) 5 MG tablet, Take 1 tablet by mouth 2 (Two) Times a Day As Needed for Muscle Spasms., Disp: 60 tablet, Rfl: 2  •  ferrous sulfate 325 (65 FE) MG tablet, Take 1 tablet by mouth Daily With Breakfast. Take with orange juice or vitamin C, Disp: 30 tablet, Rfl: 5  •  gabapentin (NEURONTIN) 300 MG capsule, Take 1 capsule by mouth 2 (Two) Times a Day As Needed (back pain/sciatrica.)., Disp: 30 capsule, Rfl: 0  •  HYDROcodone-acetaminophen (NORCO) 5-325 MG per tablet, Take 1 tablet by mouth Every 8 (Eight) Hours As Needed., Disp: 7 tablet, Rfl: 0  •  hydrOXYzine (ATARAX) 25 MG tablet, Take 1 tablet by mouth Every 6 (Six) Hours As Needed for Anxiety., Disp: 60 tablet, Rfl: 2  •  ibuprofen (ADVIL,MOTRIN) 800 MG tablet, TAKE 1 TABLET BY MOUTH EVERY 6 HOURS AS NEEDED FOR MILD PAIN, Disp: 60 tablet, Rfl: 5  •  Multiple Vitamins-Minerals (WOMENS MULTIVITAMIN + COLLAGEN PO), Take  by mouth., Disp: , Rfl:   •  multivitamin (Multiple Vitamin) tablet tablet, Take 1 tablet by mouth Daily., Disp: 30 tablet, Rfl: 5  •  omeprazole (priLOSEC) 40 MG capsule, Take 1 capsule by mouth Daily., Disp: 90 capsule, Rfl: 3  •  ondansetron ODT (ZOFRAN-ODT) 8 MG disintegrating tablet, Take 1/2 to 1 tablet by mouth (dissolve under tongue or swallow) every 8 hours as needed for nausea., Disp: 30 tablet, Rfl: 1  •  traMADol (ULTRAM) 50 MG tablet, Take 1 tablet by mouth Daily As Needed for Moderate Pain (pelvic pain)., Disp: 60 tablet, Rfl: 2  •  traZODone (DESYREL) 100 MG tablet, TAKE ONE TO TWO TABLETS BY MOUTH ONCE NIGHTLY 1 HOUR BEFORE BEDTIME AS NEEDED FOR SLEEP., Disp: 60 tablet, Rfl: 2  •  Vitamin A 3 MG (30914 UT)  "capsule, Take 10,000 Units by mouth Daily., Disp: , Rfl:   •  vitamin D (ERGOCALCIFEROL) 1.25 MG (65377 UT) capsule capsule, Take 1 capsule by mouth 1 (One) Time Per Week., Disp: 4 capsule, Rfl: 2  •  vitamin E 200 UNIT capsule, Take 200 Units by mouth Daily., Disp: , Rfl:     Allergies:   Allergies   Allergen Reactions   • Penicillins Hives       Objective     Vital Signs:   Vitals:    02/21/23 1252   BP: 122/75   Pulse: 53   Resp: 16   Temp: 97.3 °F (36.3 °C)   TempSrc: Infrared   SpO2: 99%   Weight: 83.9 kg (185 lb)   Height: 170.2 cm (67.01\")   PainSc:   4   PainLoc: Breast    Body mass index is 28.97 kg/m².   Pain Score    02/21/23 1252   PainSc:   4   PainLoc: Breast       ECOG Performance Status: 0    Physical Exam:   General: No acute distress. Well appearing   HEENT: Normocephalic, atraumatic. Sclera anicteric. Masked.  Neck: supple, no adenopathy.   Cardiovascular: regular rate and rhythm,. No murmurs.   Respiratory: Normal rate. Clear to auscultation bilaterally  Abdomen: Soft, nontender, non distended with normoactive bowel sounds  Lymph: no cervical, supraclavicular or axillary adenopathy  Neuro: Alert and oriented x 3. No focal deficits.   Ext: Symmetric, no swelling.   Psych: Euthymic      Laboratory/Imaging Reviewed:   CT CHEST W CONTRAST DIAGNOSTIC, CT ABDOMEN PELVIS W CONTRAST     Date of Exam: 2/13/2023 11:14 AM EST     Indication: Breast cancer, invasive, stage I/II/III, initial workup.     Comparison: None available.     Technique: Axial CT images were obtained of the chest, abdomen and pelvis after the uneventful intravenous administration of 95 cc Isovue-300.  Reconstructed coronal and sagittal images were also obtained. Automated exposure control and iterative   construction methods were used.     Findings:  Chest:     No suspicious pulmonary nodules or masses are identified. Central airways are grossly patent. Visualized thyroid is unremarkable. No supraclavicular adenopathy is identified. " There is a 3.5 cm mass or hematoma in the outer aspect of the right breast.   There is skin thickening over the right breast. There is a mildly prominent right axillary lymph node measuring 1.3 cm in short axis dimension. No mediastinal or hilar adenopathy is identified. Amorphous density in the anterior mediastinal fat may be due   to residual thymic tissue. There are mild degenerative changes in the spine. No aggressive osseous lesions are identified.     Abdomen and pelvis:      The spleen, pancreas, adrenals, kidneys, liver and gallbladder have a grossly normal appearance. There is a small hiatal hernia present. There is no evidence of bowel obstruction, free air or free fluid. The appendix is normal. Right colonic fecal   retention is noted. Probable follicle cyst in the left ovary. Uterus and adnexa are otherwise grossly unremarkable. Urinary bladder is grossly unremarkable. No adenopathy is identified in the abdomen or pelvis. There is normal opacification of the   mesenteric vessels. There is mild degenerative change in the spine. There is an indeterminate 1.1 cm sclerotic lesion in the roof of the right acetabulum. Additional indeterminate sclerotic lesion in the left ilium and acetabular region. Attention to   these areas on bone scan, which has been ordered, is recommended     IMPRESSION:  Impression:  Chest:  1. 3.5 cm lobulated mass in the right breast may be the patient's known primary lesion or postsurgical, and does not measure simple fluid density. Mildly prominent right axillary lymph node measuring up to 1.3 cm.  2. No evidence of pulmonary metastatic disease.     Abdomen and pelvis:  1. Indeterminate sclerotic lesions in the roof of the right acetabulum and left ilium. Attention to these areas on pending bone scan recommended.     Electronically Signed: Kike Navarrete    2/14/2023 10:38 AM EST    Workstation ID: [id number]    DATE OF EXAM: 2/13/2023 10:22 AM EST     PROCEDURE: NM BONE SCAN WHOLE  BODY     INDICATIONS: Breast cancer, invasive, stage I/II/III, initial workup     COMPARISON: CT chest, abdomen and pelvis 2/13/2023 and lumbar spine MRI 5/13/2015. CT abdomen and pelvis 3/13/2016.     TECHNIQUE: The patient received 26.7 mCi of technetium 99m MDP intravenously and 3 hour delayed anterior and posterior whole body bone images were obtained.     FINDINGS:  There is no abnormal MDP uptake to suggest osseous metastatic disease. Specifically the acetabular areas appear normal bilaterally. In addition, sclerotic foci identified within the acetabular roof and adjacent to the right and left acetabula appear   unchanged since 2016 and are consistent with bone islands.      IMPRESSION:  No evidence of osseous metastatic disease.     Electronically Signed: Luis Miguel Yip    2/14/2023 1:31 PM EST    Workstation ID: [id number]  Assessment / Plan      Assessment/Plan:     1. Malignant neoplasm of upper-outer quadrant of right breast in female, estrogen receptor positive  -I reviewed her imaging and pathology as outlined in the HPI.  -She has a recurrent right breast cancer that is ER positive and HER2 negative following lumpectomy with omission of adjuvant radiation, chemotherapy, and endocrine therapy.  She is willing to proceed with adjuvant radiation, but declines adjuvant chemotherapy or repeat Oncotype testing.  Initially she also declined endocrine therapy.  We discussed the increased risk of metastasis and breast cancer related mortality with the omission of adjuvant systemic therapy.  We discussed various options for endocrine therapy including ovarian suppression and an aromatase inhibitor which would render her postmenopausal, or trial of tamoxifen, which while sometimes associated with menopausal symptoms, is a partial estrogen receptor agonist and antagonist and does not render a patient postmenopausal.   -We discussed multiple potential quality of life side effects on tamoxifen as well as the potential  to discontinue tamoxifen if she experiences intolerable side effects.  Specifically, we discussed the risk for serious or fatal venous thromboembolism, hot flashes, muscle symptoms, hepatotoxicity, vaginal dryness and cataracts. We discussed the risk for endometrial hyperplasia/malignancies.  She should have annual gynecology exams and seek attention for any abnormal vaginal bleeding. She should avoid pregnancy due to teratogenicity.    -She has considered her options further and does wish to proceed with an endocrine therapy trial.  She requests to initiate lower dose tamoxifen at 10 mg daily which we can escalate as tolerated.  I asked her to start with every other day beginning 4/20/2023 and escalate to daily as tolerated.  She will follow-up with me in the office in late May.  -We will obtain a pregnancy test prior.  I recommended that she consider nonhormonal birth control such as a copper IUD while on tamoxifen.  Alternatively she should utilize 2 methods of barrier birth control.  -Given that this is a recurrence of her initial disease I recommended complete staging evaluation with a CT chest abdomen pelvis and bone scan, which we reviewed and was negative for metastatic disease.    Follow Up:   2 mo     Hali Gar MD  Hematology and Oncology     I have spent a total of 30 minutes on reviewing test results, preparing to see patient, counseling patient, performing medically appropriate exam and documenting clinical information in the electronic health record.

## 2023-02-22 ENCOUNTER — HOSPITAL ENCOUNTER (OUTPATIENT)
Dept: RADIATION ONCOLOGY | Facility: HOSPITAL | Age: 43
Discharge: HOME | End: 2023-02-22

## 2023-02-22 PROCEDURE — 77412 RADIATION TX DELIVERY LVL 3: CPT | Performed by: RADIOLOGY

## 2023-02-23 ENCOUNTER — HOSPITAL ENCOUNTER (OUTPATIENT)
Dept: RADIATION ONCOLOGY | Facility: HOSPITAL | Age: 43
Discharge: HOME | End: 2023-02-23

## 2023-02-23 PROCEDURE — 77336 RADIATION PHYSICS CONSULT: CPT | Performed by: RADIOLOGY

## 2023-02-23 PROCEDURE — 77412 RADIATION TX DELIVERY LVL 3: CPT | Performed by: RADIOLOGY

## 2023-02-27 ENCOUNTER — HOSPITAL ENCOUNTER (OUTPATIENT)
Dept: RADIATION ONCOLOGY | Facility: HOSPITAL | Age: 43
Discharge: HOME | End: 2023-02-27

## 2023-02-27 PROCEDURE — 77412 RADIATION TX DELIVERY LVL 3: CPT | Performed by: RADIOLOGY

## 2023-02-28 ENCOUNTER — HOSPITAL ENCOUNTER (OUTPATIENT)
Dept: RADIATION ONCOLOGY | Facility: HOSPITAL | Age: 43
Discharge: HOME | End: 2023-02-28
Payer: COMMERCIAL

## 2023-02-28 VITALS — BODY MASS INDEX: 29.22 KG/M2 | WEIGHT: 186.6 LBS

## 2023-02-28 PROCEDURE — 77417 THER RADIOLOGY PORT IMAGE(S): CPT | Performed by: RADIOLOGY

## 2023-02-28 PROCEDURE — 77412 RADIATION TX DELIVERY LVL 3: CPT | Performed by: RADIOLOGY

## 2023-03-01 ENCOUNTER — HOSPITAL ENCOUNTER (OUTPATIENT)
Dept: RADIATION ONCOLOGY | Facility: HOSPITAL | Age: 43
Discharge: HOME | End: 2023-03-01

## 2023-03-01 ENCOUNTER — HOSPITAL ENCOUNTER (OUTPATIENT)
Dept: RADIATION ONCOLOGY | Facility: HOSPITAL | Age: 43
Setting detail: RADIATION/ONCOLOGY SERIES
Discharge: HOME | End: 2023-03-01
Payer: COMMERCIAL

## 2023-03-01 PROCEDURE — 77412 RADIATION TX DELIVERY LVL 3: CPT | Performed by: RADIOLOGY

## 2023-03-02 PROCEDURE — 77336 RADIATION PHYSICS CONSULT: CPT | Performed by: RADIOLOGY

## 2023-03-03 ENCOUNTER — PATIENT OUTREACH (OUTPATIENT)
Dept: CASE MANAGEMENT | Facility: OTHER | Age: 43
End: 2023-03-03
Payer: COMMERCIAL

## 2023-03-03 NOTE — OUTREACH NOTE
AMBULATORY CASE MANAGEMENT NOTE    Name and Relationship of Patient/Support Person: Cameron April Nickie - Self    Patient Outreach    Patient reports she is not feeling well, monthly cycle started and is experiencing severe cramping. Currently taking Tramadol as directed.  No radiation treatment past 2 days due to machine malfunction.  Plans to rest this weekend.  States she is eating and hydrating well.  Started eating meat again.  Has not received information from [facility].  RN-ACM called to check, office is closed for holiday. Denies SDOH a this time.      Mariza BRIGHT  Ambulatory Case Management    3/3/2023, 14:36 EST

## 2023-03-06 ENCOUNTER — PATIENT OUTREACH (OUTPATIENT)
Dept: CASE MANAGEMENT | Facility: OTHER | Age: 43
End: 2023-03-06
Payer: COMMERCIAL

## 2023-03-06 ENCOUNTER — PATIENT MESSAGE (OUTPATIENT)
Dept: CASE MANAGEMENT | Facility: OTHER | Age: 43
End: 2023-03-06
Payer: COMMERCIAL

## 2023-03-06 ENCOUNTER — TELEPHONE (OUTPATIENT)
Dept: INTERNAL MEDICINE | Facility: CLINIC | Age: 43
End: 2023-03-06
Payer: COMMERCIAL

## 2023-03-06 NOTE — TELEPHONE ENCOUNTER
Please contact patient. I started her on gabapentin PRN for back pain and gave 30 tabs two weeks ago. She is asking for refill. Please call and ask if this is helping with pain and she is taking twice a day everyday or only as needed. thanks

## 2023-03-06 NOTE — OUTREACH NOTE
AMBULATORY CASE MANAGEMENT NOTE    Name and Relationship of Patient/Support Person: Alena Barnes - Self    Patient Outreach    Message sent to patient regarding status of [employer] financial aid janet.  Check anticipated for delivery around March 17, 2023        Mariza BRIGHT  Ambulatory Case Management    3/6/2023, 13:53 EST

## 2023-03-09 ENCOUNTER — HOSPITAL ENCOUNTER (OUTPATIENT)
Dept: RADIATION ONCOLOGY | Facility: HOSPITAL | Age: 43
Discharge: HOME | End: 2023-03-09

## 2023-03-09 PROCEDURE — 77412 RADIATION TX DELIVERY LVL 3: CPT | Performed by: RADIOLOGY

## 2023-03-10 ENCOUNTER — HOSPITAL ENCOUNTER (OUTPATIENT)
Dept: RADIATION ONCOLOGY | Facility: HOSPITAL | Age: 43
Discharge: HOME | End: 2023-03-10

## 2023-03-10 VITALS — WEIGHT: 181 LBS | BODY MASS INDEX: 28.34 KG/M2

## 2023-03-10 PROCEDURE — 77412 RADIATION TX DELIVERY LVL 3: CPT | Performed by: RADIOLOGY

## 2023-03-11 ENCOUNTER — HOSPITAL ENCOUNTER (OUTPATIENT)
Dept: RADIATION ONCOLOGY | Facility: HOSPITAL | Age: 43
Discharge: HOME | End: 2023-03-11

## 2023-03-11 PROCEDURE — 77412 RADIATION TX DELIVERY LVL 3: CPT | Performed by: RADIOLOGY

## 2023-03-13 ENCOUNTER — HOSPITAL ENCOUNTER (OUTPATIENT)
Dept: RADIATION ONCOLOGY | Facility: HOSPITAL | Age: 43
Discharge: HOME | End: 2023-03-13

## 2023-03-13 PROCEDURE — 77290 THER RAD SIMULAJ FIELD CPLX: CPT | Performed by: RADIOLOGY

## 2023-03-13 PROCEDURE — 77412 RADIATION TX DELIVERY LVL 3: CPT | Performed by: RADIOLOGY

## 2023-03-14 ENCOUNTER — HOSPITAL ENCOUNTER (OUTPATIENT)
Dept: RADIATION ONCOLOGY | Facility: HOSPITAL | Age: 43
Discharge: HOME | End: 2023-03-14

## 2023-03-14 VITALS — BODY MASS INDEX: 29.03 KG/M2 | WEIGHT: 185.4 LBS

## 2023-03-14 PROCEDURE — 77412 RADIATION TX DELIVERY LVL 3: CPT | Performed by: RADIOLOGY

## 2023-03-15 ENCOUNTER — HOSPITAL ENCOUNTER (OUTPATIENT)
Dept: RADIATION ONCOLOGY | Facility: HOSPITAL | Age: 43
Discharge: HOME | End: 2023-03-15

## 2023-03-15 ENCOUNTER — TELEPHONE (OUTPATIENT)
Dept: ONCOLOGY | Facility: CLINIC | Age: 43
End: 2023-03-15
Payer: COMMERCIAL

## 2023-03-15 PROCEDURE — 77334 RADIATION TREATMENT AID(S): CPT | Performed by: RADIOLOGY

## 2023-03-15 PROCEDURE — 77295 3-D RADIOTHERAPY PLAN: CPT | Performed by: RADIOLOGY

## 2023-03-15 PROCEDURE — 77300 RADIATION THERAPY DOSE PLAN: CPT | Performed by: RADIOLOGY

## 2023-03-15 PROCEDURE — 77412 RADIATION TX DELIVERY LVL 3: CPT | Performed by: RADIOLOGY

## 2023-03-15 NOTE — TELEPHONE ENCOUNTER
Distress Screening Follow-up    Name: Alena Barnes    : 1980    Diagnosis: No diagnosis found.    Location of Distress Screening: [facility]    Distress Level: 4 (3/14/2023 11:00 AM)      Physical Concerns:       Emotional Concerns:  Worry or anxiety: Y    Social Concerns:       Practical Concerns:       Spiritual Concerns:        Interventions:          Comments:      YUMIKO contacted pt to follow up on Distress Screen from recent visit. SW provided introductions and explained nature of the call. Pt  Communicated she has been dealing with fatigue from treatment, but overall is doing well. YUMIKO educated on role and services and let pt know that her check from [employer] should be there by 3/17, but encouraged her to reach out should she not receive it. Pt denied any other needs at this time, but thanked YUMIKO for the call. YUMIKO will be available ongoing.

## 2023-03-16 ENCOUNTER — HOSPITAL ENCOUNTER (OUTPATIENT)
Dept: RADIATION ONCOLOGY | Facility: HOSPITAL | Age: 43
Discharge: HOME | End: 2023-03-16

## 2023-03-16 PROCEDURE — 77417 THER RADIOLOGY PORT IMAGE(S): CPT | Performed by: RADIOLOGY

## 2023-03-16 PROCEDURE — 77412 RADIATION TX DELIVERY LVL 3: CPT | Performed by: RADIOLOGY

## 2023-03-17 ENCOUNTER — HOSPITAL ENCOUNTER (OUTPATIENT)
Dept: RADIATION ONCOLOGY | Facility: HOSPITAL | Age: 43
Discharge: HOME | End: 2023-03-17

## 2023-03-17 PROCEDURE — 77412 RADIATION TX DELIVERY LVL 3: CPT | Performed by: RADIOLOGY

## 2023-03-17 PROCEDURE — 77336 RADIATION PHYSICS CONSULT: CPT | Performed by: RADIOLOGY

## 2023-03-21 ENCOUNTER — HOSPITAL ENCOUNTER (OUTPATIENT)
Dept: RADIATION ONCOLOGY | Facility: HOSPITAL | Age: 43
Discharge: HOME | End: 2023-03-21

## 2023-03-21 VITALS — BODY MASS INDEX: 29.03 KG/M2 | WEIGHT: 185.4 LBS

## 2023-03-21 PROCEDURE — 77280 THER RAD SIMULAJ FIELD SMPL: CPT | Performed by: RADIOLOGY

## 2023-03-21 PROCEDURE — 77412 RADIATION TX DELIVERY LVL 3: CPT | Performed by: RADIOLOGY

## 2023-03-23 ENCOUNTER — HOSPITAL ENCOUNTER (OUTPATIENT)
Dept: RADIATION ONCOLOGY | Facility: HOSPITAL | Age: 43
Discharge: HOME | End: 2023-03-23

## 2023-03-23 PROCEDURE — 77412 RADIATION TX DELIVERY LVL 3: CPT | Performed by: RADIOLOGY

## 2023-03-23 PROCEDURE — 77417 THER RADIOLOGY PORT IMAGE(S): CPT | Performed by: RADIOLOGY

## 2023-03-24 ENCOUNTER — HOSPITAL ENCOUNTER (OUTPATIENT)
Dept: RADIATION ONCOLOGY | Facility: HOSPITAL | Age: 43
Discharge: HOME | End: 2023-03-24

## 2023-03-24 PROCEDURE — 77412 RADIATION TX DELIVERY LVL 3: CPT | Performed by: RADIOLOGY

## 2023-03-24 PROCEDURE — 77336 RADIATION PHYSICS CONSULT: CPT | Performed by: RADIOLOGY

## 2023-03-27 ENCOUNTER — HOSPITAL ENCOUNTER (OUTPATIENT)
Dept: RADIATION ONCOLOGY | Facility: HOSPITAL | Age: 43
Discharge: HOME | End: 2023-03-27

## 2023-03-27 PROCEDURE — 77412 RADIATION TX DELIVERY LVL 3: CPT | Performed by: RADIOLOGY

## 2023-03-28 ENCOUNTER — HOSPITAL ENCOUNTER (OUTPATIENT)
Dept: RADIATION ONCOLOGY | Facility: HOSPITAL | Age: 43
Discharge: HOME | End: 2023-03-28

## 2023-03-28 VITALS — WEIGHT: 183 LBS | BODY MASS INDEX: 28.66 KG/M2

## 2023-03-28 PROCEDURE — 77412 RADIATION TX DELIVERY LVL 3: CPT | Performed by: RADIOLOGY

## 2023-03-29 ENCOUNTER — HOSPITAL ENCOUNTER (OUTPATIENT)
Dept: RADIATION ONCOLOGY | Facility: HOSPITAL | Age: 43
Discharge: HOME | End: 2023-03-29

## 2023-03-29 PROCEDURE — 77412 RADIATION TX DELIVERY LVL 3: CPT | Performed by: RADIOLOGY

## 2023-03-29 NOTE — RADIATION COMPLETION NOTES
COMPLETION NOTE    PATIENT:   Alena Barnes  :    1980  COMPLETION DATE:   23  DIAGNOSIS:   Primary malignant neoplasm of upper outer quadrant of right female breast (HCC)  Staging form: Breast, AJCC 8th Edition  - Clinical stage from 2021: cT1c - Unsigned  - Pathologic stage from 2021: Stage IA (pT1c, pN0(sn), cM0, G1, ER+, ND+, HER2-) - Signed by Ioana Best MD on 2021        Pathologic stage from 1/10/2023 T1c, pN0(sn) cM) grade 2 ER/ND positive HER2/megan negative           Subjective      BRIEF HISTORY:  Alena Barnes  is a very pleasant 43 y.o. female who in May 2021 underwent right breast lumpectomy and sentinel node sampling.  Final pathology revealed invasive ductal carcinoma 14 mm in size.  0/1 sentinel node was positive for tumor.  Extended margins were negative.  Tumor was ER/ND positive HER-2/megan negative. Genetic testing was negative.    She refused adjuvant therapy  This year she was found to have a right breast mass at the 10 o'clock position measuring 1.4 cm biopsy positive for invasive ductal carcinoma ER/ND positive HER2/megan negative..  2022 she underwent right breast lumpectomy for invasive ductal carcinoma with features of significant renal cell carcinoma.  0/2 lymph nodes were positive for tumor.  Tumor was 1.2 x 1 right 0.8 cm.  The closest margin was 1.2 cm.  She received radiotherapy in our department as follows:    TREATMENT COURSE:   -2023 the right breast received 45 Gy in 25 fractions with 6 MV photons  -2023 the tumor bed was boosted with an additional 10 Gy in 5 fractions with 6 and 10 MV photons       TOLERANCE:   Ms. Barnes tolerated her treatment without difficulty.  Her skin had mild erythema and pruritis.  She used cere vae cream with relief.    Sadly, she had to euthanize her 13 year old dog during treatment.        DISPOSITION:  At the completion of therapy an appointment was made for her to return on  04/24/2023 at 2:00 pm.  She knows to call if she has any problems sooner.        Ioana Best MD    Dictated using dragon dictation

## 2023-04-03 ENCOUNTER — TRANSCRIBE ORDERS (OUTPATIENT)
Dept: MAMMOGRAPHY | Facility: HOSPITAL | Age: 43
End: 2023-04-03
Payer: COMMERCIAL

## 2023-04-03 ENCOUNTER — PATIENT OUTREACH (OUTPATIENT)
Dept: CASE MANAGEMENT | Facility: OTHER | Age: 43
End: 2023-04-03
Payer: COMMERCIAL

## 2023-04-03 DIAGNOSIS — C50.411 MALIGNANT NEOPLASM OF UPPER-OUTER QUADRANT OF RIGHT FEMALE BREAST, UNSPECIFIED ESTROGEN RECEPTOR STATUS: Primary | ICD-10-CM

## 2023-04-03 NOTE — OUTREACH NOTE
AMBULATORY CASE MANAGEMENT NOTE    Name and Relationship of Patient/Support Person: Alena Barnes - Self    Patient Outreach    RN-ACM spoke briefly with patient, was not available to talk, requested a call back at another time.      Mariza BRIGHT  Ambulatory Case Management    4/3/2023, 14:09 EDT

## 2023-04-04 ENCOUNTER — TELEPHONE (OUTPATIENT)
Dept: RADIATION ONCOLOGY | Facility: HOSPITAL | Age: 43
End: 2023-04-04
Payer: COMMERCIAL

## 2023-04-04 NOTE — TELEPHONE ENCOUNTER
"Returned patient's call regarding a \"lump\" that she found in her Rt Breast.  Patient reports that she did have Dr. Best examine this \"lump\" several weeks ago when it first developed and Dr. Best felt that it was likely a seroma from Lumpectomy.  Patient states that it is not painful but has grown from the size of a quarter when examined before and is now the size of a half dollar.  Discussed with Dr. Best, who feels that this is still likely a seroma, but cannot be certain without examining the patient.  Patient stated that her daughter's birthday is tomorrow and although she is off work, she declined to come into the clinic.  Dr. Best is out of the office on Friday, 4/7/23.  Patient decided that she would watch the area through the weekend and call if she feels that she needs to be seen; if \"lump\" has grown or becomes painful.  Recommended that patient call 911 or go to the nearest ED for any emergencies that should arise.  Patient has my contact information.   "

## 2023-04-07 ENCOUNTER — PATIENT OUTREACH (OUTPATIENT)
Dept: CASE MANAGEMENT | Facility: OTHER | Age: 43
End: 2023-04-07
Payer: COMMERCIAL

## 2023-04-07 ENCOUNTER — TELEPHONE (OUTPATIENT)
Dept: RADIATION ONCOLOGY | Facility: HOSPITAL | Age: 43
End: 2023-04-07
Payer: COMMERCIAL

## 2023-04-07 NOTE — TELEPHONE ENCOUNTER
"Returned patient's call regarding continued concern for \"lump\" in Right Breast that was discussed earlier this week.  Patient states that it has not grown and is no more tender or reddened than before, but she would like for Dr. Best to examine.  Patient will come to the Clinic at 1:00 pm on Monday, 4/10/23.  Patient verbalized an understanding of agreed upon date, time, and location of Clinic appointment.  Patient has my contact information and will call with additional questions or concerns or if she is unable to make appointment on Monday.   "

## 2023-04-07 NOTE — OUTREACH NOTE
AMBULATORY CASE MANAGEMENT NOTE    Name and Relationship of Patient/Support Person: Barnes, April Nickie - Self    Patient Outreach    Patient reports hard round lump on inside of right breast.  States that is uncomfortable with movement, has increased in size from quarter size to half-dollar size.  Patient states she has called and left message requesting to see someone on Monday. Patient received check from Lucie [facility]let[facility].  No other needs identified at this time.        Mariza BRIGHT  Ambulatory Case Management    4/7/2023, 12:54 EDT

## 2023-04-10 ENCOUNTER — DOCUMENTATION (OUTPATIENT)
Dept: RADIATION ONCOLOGY | Facility: HOSPITAL | Age: 43
End: 2023-04-10
Payer: COMMERCIAL

## 2023-04-10 NOTE — CODE DOCUMENTATION
Patient in the clinic today to follow up on some swelling and tenderness on her inner right breast that has grown since completion of XRT on 3/29/23.  Patient feeling good since completion, but was uneasy about this area and wanted Dr. Best to examine before follow up appointment that is scheduled with Yolanda on 4/24/23 at 2:00 pm.  Dr. Best assessed area as Lymphedema causing tenderness.  Dr. Best recommended wearing a sports bra and massaging area with lymphedema to move edema out into the tissues.  Dr. Best suggested an OT Consult for Lymphedema treatment, but patient declined stating that she will complete massages and continue to wear a sports bra.  Patient stated that she feels more at ease knowing that Dr. Best evaluated this area of concern.  Patient aware of follow-up appointment with Yolanda on 4/24.

## 2023-04-14 ENCOUNTER — PATIENT OUTREACH (OUTPATIENT)
Dept: CASE MANAGEMENT | Facility: OTHER | Age: 43
End: 2023-04-14
Payer: COMMERCIAL

## 2023-04-14 NOTE — OUTREACH NOTE
AMBULATORY CASE MANAGEMENT NOTE    Name and Relationship of Patient/Support Person: Cameron Alena Fu - Self    Patient Outreach    Brief conversation with patient, woke her from sleep.  Breast lump was examined 4/10/2023. Patient assured it was a seroma.  Patient very relieved.       Mariza BRIGHT  Ambulatory Case Management    4/14/2023, 11:46 EDT

## 2023-04-17 ENCOUNTER — OFFICE VISIT (OUTPATIENT)
Dept: INTERNAL MEDICINE | Facility: CLINIC | Age: 43
End: 2023-04-17
Payer: COMMERCIAL

## 2023-04-17 ENCOUNTER — LAB (OUTPATIENT)
Dept: LAB | Facility: HOSPITAL | Age: 43
End: 2023-04-17
Payer: COMMERCIAL

## 2023-04-17 VITALS
OXYGEN SATURATION: 98 % | DIASTOLIC BLOOD PRESSURE: 80 MMHG | SYSTOLIC BLOOD PRESSURE: 110 MMHG | TEMPERATURE: 98.2 F | HEART RATE: 68 BPM | RESPIRATION RATE: 16 BRPM | HEIGHT: 67 IN | WEIGHT: 186.2 LBS | BODY MASS INDEX: 29.22 KG/M2

## 2023-04-17 DIAGNOSIS — M54.41 CHRONIC BILATERAL LOW BACK PAIN WITH BILATERAL SCIATICA: Primary | ICD-10-CM

## 2023-04-17 DIAGNOSIS — Z13.1 SCREENING FOR DIABETES MELLITUS: ICD-10-CM

## 2023-04-17 DIAGNOSIS — E55.9 VITAMIN D DEFICIENCY: ICD-10-CM

## 2023-04-17 DIAGNOSIS — G89.29 CHRONIC BILATERAL LOW BACK PAIN WITH BILATERAL SCIATICA: Primary | ICD-10-CM

## 2023-04-17 DIAGNOSIS — Z13.29 THYROID DISORDER SCREENING: ICD-10-CM

## 2023-04-17 DIAGNOSIS — Z17.0 MALIGNANT NEOPLASM OF UPPER-OUTER QUADRANT OF RIGHT BREAST IN FEMALE, ESTROGEN RECEPTOR POSITIVE: ICD-10-CM

## 2023-04-17 DIAGNOSIS — Z11.59 ENCOUNTER FOR HEPATITIS C SCREENING TEST FOR LOW RISK PATIENT: ICD-10-CM

## 2023-04-17 DIAGNOSIS — Z13.220 LIPID SCREENING: ICD-10-CM

## 2023-04-17 DIAGNOSIS — Z13.21 ENCOUNTER FOR VITAMIN DEFICIENCY SCREENING: ICD-10-CM

## 2023-04-17 DIAGNOSIS — M54.42 CHRONIC BILATERAL LOW BACK PAIN WITH BILATERAL SCIATICA: Primary | ICD-10-CM

## 2023-04-17 DIAGNOSIS — N80.9 ENDOMETRIOSIS: ICD-10-CM

## 2023-04-17 DIAGNOSIS — Z13.0 SCREENING FOR BLOOD DISEASE: ICD-10-CM

## 2023-04-17 DIAGNOSIS — C50.411 MALIGNANT NEOPLASM OF UPPER-OUTER QUADRANT OF RIGHT BREAST IN FEMALE, ESTROGEN RECEPTOR POSITIVE: ICD-10-CM

## 2023-04-17 DIAGNOSIS — R41.840 DIFFICULTY CONCENTRATING: ICD-10-CM

## 2023-04-17 LAB
BASOPHILS # BLD AUTO: 0.04 10*3/MM3 (ref 0–0.2)
BASOPHILS NFR BLD AUTO: 0.7 % (ref 0–1.5)
DEPRECATED RDW RBC AUTO: 39.7 FL (ref 37–54)
EOSINOPHIL # BLD AUTO: 0.04 10*3/MM3 (ref 0–0.4)
EOSINOPHIL NFR BLD AUTO: 0.7 % (ref 0.3–6.2)
ERYTHROCYTE [DISTWIDTH] IN BLOOD BY AUTOMATED COUNT: 11.7 % (ref 12.3–15.4)
HCT VFR BLD AUTO: 42.5 % (ref 34–46.6)
HGB BLD-MCNC: 14.5 G/DL (ref 12–15.9)
IMM GRANULOCYTES # BLD AUTO: 0.01 10*3/MM3 (ref 0–0.05)
IMM GRANULOCYTES NFR BLD AUTO: 0.2 % (ref 0–0.5)
LYMPHOCYTES # BLD AUTO: 1.35 10*3/MM3 (ref 0.7–3.1)
LYMPHOCYTES NFR BLD AUTO: 25.1 % (ref 19.6–45.3)
MCH RBC QN AUTO: 32 PG (ref 26.6–33)
MCHC RBC AUTO-ENTMCNC: 34.1 G/DL (ref 31.5–35.7)
MCV RBC AUTO: 93.8 FL (ref 79–97)
MONOCYTES # BLD AUTO: 0.44 10*3/MM3 (ref 0.1–0.9)
MONOCYTES NFR BLD AUTO: 8.2 % (ref 5–12)
NEUTROPHILS NFR BLD AUTO: 3.49 10*3/MM3 (ref 1.7–7)
NEUTROPHILS NFR BLD AUTO: 65.1 % (ref 42.7–76)
NRBC BLD AUTO-RTO: 0.2 /100 WBC (ref 0–0.2)
PLATELET # BLD AUTO: 193 10*3/MM3 (ref 140–450)
PMV BLD AUTO: 11.9 FL (ref 6–12)
RBC # BLD AUTO: 4.53 10*6/MM3 (ref 3.77–5.28)
WBC NRBC COR # BLD: 5.37 10*3/MM3 (ref 3.4–10.8)

## 2023-04-17 PROCEDURE — 86803 HEPATITIS C AB TEST: CPT | Performed by: NURSE PRACTITIONER

## 2023-04-17 PROCEDURE — 80050 GENERAL HEALTH PANEL: CPT | Performed by: NURSE PRACTITIONER

## 2023-04-17 PROCEDURE — 82607 VITAMIN B-12: CPT | Performed by: NURSE PRACTITIONER

## 2023-04-17 PROCEDURE — 82746 ASSAY OF FOLIC ACID SERUM: CPT | Performed by: NURSE PRACTITIONER

## 2023-04-17 PROCEDURE — 80061 LIPID PANEL: CPT | Performed by: NURSE PRACTITIONER

## 2023-04-17 PROCEDURE — 82306 VITAMIN D 25 HYDROXY: CPT | Performed by: NURSE PRACTITIONER

## 2023-04-17 PROCEDURE — 83036 HEMOGLOBIN GLYCOSYLATED A1C: CPT | Performed by: NURSE PRACTITIONER

## 2023-04-17 RX ORDER — GABAPENTIN 300 MG/1
300 CAPSULE ORAL DAILY PRN
Qty: 30 CAPSULE | Refills: 0 | Status: SHIPPED | OUTPATIENT
Start: 2023-04-17

## 2023-04-17 NOTE — PROGRESS NOTES
Subjective   Chief Complaint   Patient presents with   • Follow-up     Med refills   • Anxiety   • Back Pain      April Nickie Barnes is a 43 y.o. female.     The patient is here today for a follow-up. She has been diagnosed with right breast cancer, and she previously had a lumpectomy. She had declined radiation therapy in the past. Now, she has a spot that tested positive, and she was willing to do the radiation therapy. She did not want to do any continued treatment, but her oncologist talked her into trying the tamoxifen. She reports that she is having significant side effects from the radiation therapy, including memory fog and difficulty concentrating. The patient has a history of chronic low back pain with sciatica and has in the past taken clonazepam for this. I discussed with the patient that clonazepam was not an appropriate medication to take for sciatica, and she was willing to try gabapentin. She states that gabapentin is helping her sciatica, and she is doing well with it. She also has a history of endometriosis with severely painful menstrual cycles and is taking tramadol as needed. The patient's previous laboratory work showed a vitamin D deficiency, and she has been taking a supplement.    Malignant neoplasm of the upper outer quadrant of the right breast  The patient is doing well. She recently finished radiation therapy on 03/29/2023, with minimal side effects. She had itchiness as a side effect. She is still experiencing brain fog characterized by fatigue, confusion, and lack of focus. She is going to start taking tamoxifen at the end of 04/2023. She is under the care of Dr. Hali Gar. She had laboratory work done on 01/2023.     Difficulty concentrating  The patient has been taking Adderall without a medical prescription due to experiencing mental changes as a side effect of radiation therapy. She experiences a lack of focus and forgetfulness. She requests a prescription for Adderall. She  had seen MACY Lafleur, in the past and notes that she will be calling the said provider and is planning to request a prescription for Adderall. She mentions that her child was diagnosed with attention deficit hyperactivity disorder.     Chronic bilateral low back pain  The patient has been taking gabapentin daily as needed and notes its effectiveness on her sciatica. She mostly takes gabapentin about once a week. She had taken clonazepam in the past. She has issues with the refills for gabapentin and is requesting a resend of refills.     General health maintenance  The patient is due for annual laboratory work. She is fasting today.    I have reviewed the following portions of the patient's history and confirmed they are accurate: allergies, current medications, past family history, past medical history, past social history, past surgical history, and problem list    I have personally completed the patient's review of systems.    Review of Systems   Constitutional: Positive for fatigue. Negative for activity change, appetite change, chills, diaphoresis, fever, unexpected weight gain and unexpected weight loss.   HENT: Negative for congestion, ear discharge, ear pain, mouth sores, nosebleeds, rhinorrhea, sinus pressure, sneezing and sore throat.    Eyes: Negative for pain, discharge and itching.   Respiratory: Negative for cough, chest tightness, shortness of breath and wheezing.    Cardiovascular: Negative for chest pain, palpitations and leg swelling.   Gastrointestinal: Negative for abdominal pain, constipation, diarrhea, nausea and vomiting.   Endocrine: Negative for heat intolerance, polydipsia and polyphagia.   Genitourinary: Positive for menstrual problem and pelvic pain. Negative for dysuria, flank pain, frequency, hematuria and urgency.   Musculoskeletal: Positive for arthralgias, back pain and myalgias. Negative for gait problem, joint swelling, neck pain and neck stiffness.   Skin: Negative for  color change, dry skin, pallor and rash.   Allergic/Immunologic: Negative for environmental allergies and immunocompromised state.   Neurological: Positive for memory problem. Negative for seizures, speech difficulty, weakness and numbness.   Hematological: Negative for adenopathy.   Psychiatric/Behavioral: Positive for stress. Negative for agitation, decreased concentration, sleep disturbance, suicidal ideas and depressed mood. The patient is nervous/anxious.        Current Outpatient Medications on File Prior to Visit   Medication Sig   • Ascorbic Acid (VITAMIN C GUMMIES PO) Take  by mouth.   • busPIRone (BUSPAR) 5 MG tablet TAKE ONE TO TWO TABLETS BY MOUTH THREE TIMES A DAY AS NEEDED FOR ANXIETY   • cyclobenzaprine (FLEXERIL) 5 MG tablet Take 1 tablet by mouth 2 (Two) Times a Day As Needed for Muscle Spasms.   • ferrous sulfate 325 (65 FE) MG tablet Take 1 tablet by mouth Daily With Breakfast. Take with orange juice or vitamin C   • ibuprofen (ADVIL,MOTRIN) 800 MG tablet TAKE 1 TABLET BY MOUTH EVERY 6 HOURS AS NEEDED FOR MILD PAIN   • Multiple Vitamins-Minerals (WOMENS MULTIVITAMIN + COLLAGEN PO) Take  by mouth.   • omeprazole (priLOSEC) 40 MG capsule Take 1 capsule by mouth Daily. (Patient taking differently: Take 1 capsule by mouth Daily. As needed)   • ondansetron ODT (ZOFRAN-ODT) 8 MG disintegrating tablet Take 1/2 to 1 tablet by mouth (dissolve under tongue or swallow) every 8 hours as needed for nausea.   • traMADol (ULTRAM) 50 MG tablet Take 1 tablet by mouth Daily As Needed for Moderate Pain (pelvic pain).   • traZODone (DESYREL) 100 MG tablet TAKE ONE TO TWO TABLETS BY MOUTH ONCE NIGHTLY 1 HOUR BEFORE BEDTIME AS NEEDED FOR SLEEP.   • vitamin D (ERGOCALCIFEROL) 1.25 MG (02441 UT) capsule capsule Take 1 capsule by mouth 1 (One) Time Per Week.   • vitamin E 200 UNIT capsule Take 1 capsule by mouth Daily.   • [DISCONTINUED] gabapentin (NEURONTIN) 300 MG capsule Take 1 capsule by mouth 2 (Two) Times a Day As  "Needed (back pain/sciatrica.).   • multivitamin (Multiple Vitamin) tablet tablet Take 1 tablet by mouth Daily. (Patient not taking: Reported on 4/17/2023)   • tamoxifen (NOLVADEX) 10 MG tablet Take 1 tablet by mouth Daily. Start 4/20/2023 (Patient not taking: Reported on 4/17/2023)   • Vitamin A 3 MG (97746 UT) capsule Take 10,000 Units by mouth Daily. (Patient not taking: Reported on 4/17/2023)   • [DISCONTINUED] HYDROcodone-acetaminophen (NORCO) 5-325 MG per tablet Take 1 tablet by mouth Every 8 (Eight) Hours As Needed. (Patient not taking: Reported on 4/17/2023)   • [DISCONTINUED] hydrOXYzine (ATARAX) 25 MG tablet Take 1 tablet by mouth Every 6 (Six) Hours As Needed for Anxiety. (Patient not taking: Reported on 4/17/2023)     No current facility-administered medications on file prior to visit.       Objective   Vitals:    04/17/23 1358   BP: 110/80   Pulse: 68   Resp: 16   Temp: 98.2 °F (36.8 °C)   TempSrc: Tympanic   SpO2: 98%   Weight: 84.5 kg (186 lb 3.2 oz)   Height: 170.2 cm (67.01\")     Body mass index is 29.15 kg/m².    Physical Exam  Vitals reviewed.   Constitutional:       Appearance: Normal appearance. She is well-developed.   HENT:      Head: Normocephalic and atraumatic.      Nose: Nose normal.   Eyes:      General: Lids are normal.      Conjunctiva/sclera: Conjunctivae normal.      Pupils: Pupils are equal, round, and reactive to light.   Neck:      Thyroid: No thyromegaly.      Trachea: Trachea normal.   Pulmonary:      Effort: Pulmonary effort is normal. No respiratory distress.   Skin:     General: Skin is warm and dry.   Neurological:      Mental Status: She is alert and oriented to person, place, and time.      GCS: GCS eye subscore is 4. GCS verbal subscore is 5. GCS motor subscore is 6.   Psychiatric:         Attention and Perception: Attention normal.         Mood and Affect: Mood normal.         Speech: Speech normal.         Behavior: Behavior normal. Behavior is cooperative. "         Assessment & Plan   Problem List Items Addressed This Visit        Genitourinary and Reproductive     Endometriosis       Hematology and Neoplasia    Malignant neoplasm of upper-outer quadrant of right breast in female, estrogen receptor positive (HCC)    Relevant Orders    CBC Auto Differential       Musculoskeletal and Injuries    Low back pain - Primary    Relevant Medications    gabapentin (NEURONTIN) 300 MG capsule   Other Visit Diagnoses     Screening for blood disease        Relevant Orders    Comprehensive Metabolic Panel    Lipid Panel    Hemoglobin A1c    TSH Rfx On Abnormal To Free T4    Vitamin B12 & Folate    Vitamin D,25-Hydroxy    CBC Auto Differential    Thyroid disorder screening        Relevant Orders    TSH Rfx On Abnormal To Free T4    Lipid screening        Relevant Orders    Lipid Panel    Encounter for vitamin deficiency screening        Relevant Orders    Vitamin B12 & Folate    Vitamin D,25-Hydroxy    Screening for diabetes mellitus        Relevant Orders    Hemoglobin A1c    Vitamin D deficiency        Relevant Orders    Vitamin D,25-Hydroxy    Encounter for hepatitis C screening test for low risk patient        Relevant Orders    Hepatitis C Antibody             Current Outpatient Medications:   •  Ascorbic Acid (VITAMIN C GUMMIES PO), Take  by mouth., Disp: , Rfl:   •  busPIRone (BUSPAR) 5 MG tablet, TAKE ONE TO TWO TABLETS BY MOUTH THREE TIMES A DAY AS NEEDED FOR ANXIETY, Disp: 90 tablet, Rfl: 2  •  cyclobenzaprine (FLEXERIL) 5 MG tablet, Take 1 tablet by mouth 2 (Two) Times a Day As Needed for Muscle Spasms., Disp: 60 tablet, Rfl: 2  •  ferrous sulfate 325 (65 FE) MG tablet, Take 1 tablet by mouth Daily With Breakfast. Take with orange juice or vitamin C, Disp: 30 tablet, Rfl: 5  •  gabapentin (NEURONTIN) 300 MG capsule, Take 1 capsule by mouth Daily As Needed (back pain/sciatrica.)., Disp: 30 capsule, Rfl: 0  •  ibuprofen (ADVIL,MOTRIN) 800 MG tablet, TAKE 1 TABLET BY MOUTH  EVERY 6 HOURS AS NEEDED FOR MILD PAIN, Disp: 60 tablet, Rfl: 5  •  Multiple Vitamins-Minerals (WOMENS MULTIVITAMIN + COLLAGEN PO), Take  by mouth., Disp: , Rfl:   •  omeprazole (priLOSEC) 40 MG capsule, Take 1 capsule by mouth Daily. (Patient taking differently: Take 1 capsule by mouth Daily. As needed), Disp: 90 capsule, Rfl: 3  •  ondansetron ODT (ZOFRAN-ODT) 8 MG disintegrating tablet, Take 1/2 to 1 tablet by mouth (dissolve under tongue or swallow) every 8 hours as needed for nausea., Disp: 30 tablet, Rfl: 1  •  traMADol (ULTRAM) 50 MG tablet, Take 1 tablet by mouth Daily As Needed for Moderate Pain (pelvic pain)., Disp: 60 tablet, Rfl: 2  •  traZODone (DESYREL) 100 MG tablet, TAKE ONE TO TWO TABLETS BY MOUTH ONCE NIGHTLY 1 HOUR BEFORE BEDTIME AS NEEDED FOR SLEEP., Disp: 60 tablet, Rfl: 2  •  vitamin D (ERGOCALCIFEROL) 1.25 MG (71148 UT) capsule capsule, Take 1 capsule by mouth 1 (One) Time Per Week., Disp: 4 capsule, Rfl: 2  •  vitamin E 200 UNIT capsule, Take 1 capsule by mouth Daily., Disp: , Rfl:   •  multivitamin (Multiple Vitamin) tablet tablet, Take 1 tablet by mouth Daily. (Patient not taking: Reported on 4/17/2023), Disp: 30 tablet, Rfl: 5  •  tamoxifen (NOLVADEX) 10 MG tablet, Take 1 tablet by mouth Daily. Start 4/20/2023 (Patient not taking: Reported on 4/17/2023), Disp: 30 tablet, Rfl: 1  •  Vitamin A 3 MG (51950 UT) capsule, Take 10,000 Units by mouth Daily. (Patient not taking: Reported on 4/17/2023), Disp: , Rfl:      PLAN    Chronic bilateral low back pain  Chronic, stable.  The patient will continue gabapentin as needed.    Endometriosis  Chronic, stable.  The patient will continue taking tramadol as needed.  She will continue taking ibuprofen as needed.  She will continue follow-ups with a gynecologist.    Malignant neoplasm of the upper outer quadrant of the right breast  Chronic, stable.  She will continue follow-ups in oncology.  She will be starting tamoxifen.  I encouraged the patient to  follow up with a psychiatrist to discuss being prescribed Adderall.   The patient will follow up with a psychiatrist due to related side effects.     Difficulty concentrating  New, unstable.  The patient will schedule an appointment with a psychiatrist to discuss being prescribed Adderall.  I discussed with the patient that it is not appropriate for her to be taking medications that are not prescribed for her, especially since this is a controlled substance.  The patient will complete her drug screen at the next appointment and discuss this with a psychiatrist.    Vitamin D deficiency  Chronic, unstable.  The patient will continue taking vitamin D supplements.  She is completing her vitamin D laboratory work today.    Plan of care reviewed with the patient at the conclusion of today's visit.  Education was provided regarding diagnosis, management, and any prescribed or recommended OTC medications.  Patient verbalized understanding of and agreement with management plan.     Return in about 3 months (around 7/17/2023), or if symptoms worsen or fail to improve, for Follow-up.    Transcribed from ambient dictation for [facility] [place] by Carole Lopez.  04/17/23   17:22 EDT    Patient or patient representative verbalized consent to the visit recording.  I have personally performed the services described in this document as transcribed by the above individual, and it is both accurate and complete.

## 2023-04-18 DIAGNOSIS — M51.36 DEGENERATION OF INTERVERTEBRAL DISC OF LUMBAR REGION: ICD-10-CM

## 2023-04-18 DIAGNOSIS — N80.9 ENDOMETRIOSIS: ICD-10-CM

## 2023-04-18 LAB
25(OH)D3 SERPL-MCNC: 26.9 NG/ML (ref 30–100)
ALBUMIN SERPL-MCNC: 4.5 G/DL (ref 3.5–5.2)
ALBUMIN/GLOB SERPL: 1.7 G/DL
ALP SERPL-CCNC: 75 U/L (ref 39–117)
ALT SERPL W P-5'-P-CCNC: 21 U/L (ref 1–33)
ANION GAP SERPL CALCULATED.3IONS-SCNC: 8 MMOL/L (ref 5–15)
AST SERPL-CCNC: 25 U/L (ref 1–32)
BILIRUB SERPL-MCNC: 0.3 MG/DL (ref 0–1.2)
BUN SERPL-MCNC: 11 MG/DL (ref 6–20)
BUN/CREAT SERPL: 17.5 (ref 7–25)
CALCIUM SPEC-SCNC: 9.5 MG/DL (ref 8.6–10.5)
CHLORIDE SERPL-SCNC: 106 MMOL/L (ref 98–107)
CHOLEST SERPL-MCNC: 186 MG/DL (ref 0–200)
CO2 SERPL-SCNC: 27 MMOL/L (ref 22–29)
CREAT SERPL-MCNC: 0.63 MG/DL (ref 0.57–1)
EGFRCR SERPLBLD CKD-EPI 2021: 111.6 ML/MIN/1.73
FOLATE SERPL-MCNC: 7.43 NG/ML (ref 4.78–24.2)
GLOBULIN UR ELPH-MCNC: 2.6 GM/DL
GLUCOSE SERPL-MCNC: 79 MG/DL (ref 65–99)
HBA1C MFR BLD: 5 % (ref 4.8–5.6)
HCV AB SER DONR QL: NORMAL
HDLC SERPL-MCNC: 66 MG/DL (ref 40–60)
LDLC SERPL CALC-MCNC: 102 MG/DL (ref 0–100)
LDLC/HDLC SERPL: 1.51 {RATIO}
POTASSIUM SERPL-SCNC: 4.2 MMOL/L (ref 3.5–5.2)
PROT SERPL-MCNC: 7.1 G/DL (ref 6–8.5)
SODIUM SERPL-SCNC: 141 MMOL/L (ref 136–145)
TRIGL SERPL-MCNC: 101 MG/DL (ref 0–150)
TSH SERPL DL<=0.05 MIU/L-ACNC: 3.63 UIU/ML (ref 0.27–4.2)
VIT B12 BLD-MCNC: 444 PG/ML (ref 211–946)
VLDLC SERPL-MCNC: 18 MG/DL (ref 5–40)

## 2023-04-18 RX ORDER — CYCLOBENZAPRINE HCL 5 MG
TABLET ORAL
Qty: 60 TABLET | Refills: 2 | Status: SHIPPED | OUTPATIENT
Start: 2023-04-18

## 2023-05-01 ENCOUNTER — HOSPITAL ENCOUNTER (OUTPATIENT)
Dept: RADIATION ONCOLOGY | Facility: HOSPITAL | Age: 43
Setting detail: RADIATION/ONCOLOGY SERIES
Discharge: HOME | End: 2023-05-01

## 2023-05-01 DIAGNOSIS — G47.09 OTHER INSOMNIA: ICD-10-CM

## 2023-05-01 RX ORDER — TRAZODONE HYDROCHLORIDE 100 MG/1
TABLET ORAL
Qty: 60 TABLET | Refills: 2 | Status: SHIPPED | OUTPATIENT
Start: 2023-05-01

## 2023-05-16 DIAGNOSIS — G89.29 CHRONIC BILATERAL LOW BACK PAIN WITH BILATERAL SCIATICA: ICD-10-CM

## 2023-05-16 DIAGNOSIS — M54.42 CHRONIC BILATERAL LOW BACK PAIN WITH BILATERAL SCIATICA: ICD-10-CM

## 2023-05-16 DIAGNOSIS — M54.41 CHRONIC BILATERAL LOW BACK PAIN WITH BILATERAL SCIATICA: ICD-10-CM

## 2023-05-16 RX ORDER — GABAPENTIN 300 MG/1
CAPSULE ORAL
Qty: 30 CAPSULE | Refills: 1 | Status: SHIPPED | OUTPATIENT
Start: 2023-05-16

## 2023-05-30 ENCOUNTER — LAB (OUTPATIENT)
Dept: LAB | Facility: HOSPITAL | Age: 43
End: 2023-05-30

## 2023-05-30 ENCOUNTER — OFFICE VISIT (OUTPATIENT)
Dept: ONCOLOGY | Facility: CLINIC | Age: 43
End: 2023-05-30

## 2023-05-30 VITALS
RESPIRATION RATE: 16 BRPM | DIASTOLIC BLOOD PRESSURE: 92 MMHG | HEIGHT: 67 IN | SYSTOLIC BLOOD PRESSURE: 138 MMHG | HEART RATE: 55 BPM | WEIGHT: 186 LBS | BODY MASS INDEX: 29.19 KG/M2 | OXYGEN SATURATION: 98 % | TEMPERATURE: 97.9 F

## 2023-05-30 DIAGNOSIS — Z17.0 MALIGNANT NEOPLASM OF UPPER-OUTER QUADRANT OF RIGHT BREAST IN FEMALE, ESTROGEN RECEPTOR POSITIVE: Primary | ICD-10-CM

## 2023-05-30 DIAGNOSIS — C50.411 MALIGNANT NEOPLASM OF UPPER-OUTER QUADRANT OF RIGHT BREAST IN FEMALE, ESTROGEN RECEPTOR POSITIVE: Primary | ICD-10-CM

## 2023-05-30 DIAGNOSIS — Z17.0 MALIGNANT NEOPLASM OF UPPER-OUTER QUADRANT OF RIGHT BREAST IN FEMALE, ESTROGEN RECEPTOR POSITIVE: ICD-10-CM

## 2023-05-30 DIAGNOSIS — C50.411 MALIGNANT NEOPLASM OF UPPER-OUTER QUADRANT OF RIGHT BREAST IN FEMALE, ESTROGEN RECEPTOR POSITIVE: ICD-10-CM

## 2023-05-30 LAB
ALBUMIN SERPL-MCNC: 4.3 G/DL (ref 3.5–5.2)
ALBUMIN/GLOB SERPL: 1.7 G/DL
ALP SERPL-CCNC: 71 U/L (ref 39–117)
ALT SERPL W P-5'-P-CCNC: 21 U/L (ref 1–33)
ANION GAP SERPL CALCULATED.3IONS-SCNC: 10 MMOL/L (ref 5–15)
AST SERPL-CCNC: 21 U/L (ref 1–32)
BASOPHILS # BLD AUTO: 0.02 10*3/MM3 (ref 0–0.2)
BASOPHILS NFR BLD AUTO: 0.4 % (ref 0–1.5)
BILIRUB SERPL-MCNC: 0.4 MG/DL (ref 0–1.2)
BUN SERPL-MCNC: 10 MG/DL (ref 6–20)
BUN/CREAT SERPL: 17.2 (ref 7–25)
CALCIUM SPEC-SCNC: 9.3 MG/DL (ref 8.6–10.5)
CHLORIDE SERPL-SCNC: 106 MMOL/L (ref 98–107)
CO2 SERPL-SCNC: 27 MMOL/L (ref 22–29)
CREAT SERPL-MCNC: 0.58 MG/DL (ref 0.57–1)
DEPRECATED RDW RBC AUTO: 41.1 FL (ref 37–54)
EGFRCR SERPLBLD CKD-EPI 2021: 113.9 ML/MIN/1.73
EOSINOPHIL # BLD AUTO: 0.03 10*3/MM3 (ref 0–0.4)
EOSINOPHIL NFR BLD AUTO: 0.6 % (ref 0.3–6.2)
ERYTHROCYTE [DISTWIDTH] IN BLOOD BY AUTOMATED COUNT: 11.5 % (ref 12.3–15.4)
GLOBULIN UR ELPH-MCNC: 2.5 GM/DL
GLUCOSE SERPL-MCNC: 56 MG/DL (ref 65–99)
HCT VFR BLD AUTO: 42.1 % (ref 34–46.6)
HGB BLD-MCNC: 13.9 G/DL (ref 12–15.9)
IMM GRANULOCYTES # BLD AUTO: 0.02 10*3/MM3 (ref 0–0.05)
IMM GRANULOCYTES NFR BLD AUTO: 0.4 % (ref 0–0.5)
LYMPHOCYTES # BLD AUTO: 1.18 10*3/MM3 (ref 0.7–3.1)
LYMPHOCYTES NFR BLD AUTO: 23 % (ref 19.6–45.3)
MCH RBC QN AUTO: 31.5 PG (ref 26.6–33)
MCHC RBC AUTO-ENTMCNC: 33 G/DL (ref 31.5–35.7)
MCV RBC AUTO: 95.5 FL (ref 79–97)
MONOCYTES # BLD AUTO: 0.39 10*3/MM3 (ref 0.1–0.9)
MONOCYTES NFR BLD AUTO: 7.6 % (ref 5–12)
NEUTROPHILS NFR BLD AUTO: 3.5 10*3/MM3 (ref 1.7–7)
NEUTROPHILS NFR BLD AUTO: 68 % (ref 42.7–76)
PLATELET # BLD AUTO: 154 10*3/MM3 (ref 140–450)
PMV BLD AUTO: 11 FL (ref 6–12)
POTASSIUM SERPL-SCNC: 3.9 MMOL/L (ref 3.5–5.2)
PROT SERPL-MCNC: 6.8 G/DL (ref 6–8.5)
RBC # BLD AUTO: 4.41 10*6/MM3 (ref 3.77–5.28)
SODIUM SERPL-SCNC: 143 MMOL/L (ref 136–145)
WBC NRBC COR # BLD: 5.14 10*3/MM3 (ref 3.4–10.8)

## 2023-05-30 PROCEDURE — 85025 COMPLETE CBC W/AUTO DIFF WBC: CPT

## 2023-05-30 PROCEDURE — 99214 OFFICE O/P EST MOD 30 MIN: CPT | Performed by: INTERNAL MEDICINE

## 2023-05-30 PROCEDURE — 80053 COMPREHEN METABOLIC PANEL: CPT

## 2023-05-30 PROCEDURE — 36415 COLL VENOUS BLD VENIPUNCTURE: CPT

## 2023-05-30 PROCEDURE — 1125F AMNT PAIN NOTED PAIN PRSNT: CPT | Performed by: INTERNAL MEDICINE

## 2023-05-30 NOTE — PROGRESS NOTES
Hematology and Oncology [place]  Office number [phone]    Fax number [phone]     Follow up     Date: 2023     Patient Name: Alena Barnes  MRN: [id number]  : 1980    Referring Physician: Dr. Honorio Noguera    Chief Complaint: recurrent right breast cancer    Cancer Staging: recurrent IA      History of Present Illness: Alena Barnes is a pleasant 43 y.o. female who presents today for evaluation of recurrent right breast cancer. She is accompanied by her supportive mom.     She has a history of a grade two 1.4 cm stage Ia invasive ductal carcinoma of the right breast (ER 95%, IL 90%, HER2/megan negative) status post lumpectomy and sentinel lymph node biopsy (0) in May 2021.  Margins were negative by less than 1 mm. Oncotype was 25.  Patient declined all adjuvant therapy including radiation and endocrine therapy.    She was found to have a local recurrence and underwent repeat lumpectomy showing grade 2 invasive ductal carcinoma (ER 95%, IL 80%, and HER2/megan negative).  2 sentinel lymph nodes were negative.  Final margins were negative.  She is planning adjuvant radiation.     She initially tells me that she has no interest in chemotherapy or endocrine therapy.  On further exploration her main concern is experiencing permanent menopause.  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    Interval history:  She returns accompanied by her 9-year-old daughter.  She reports that she initially did well on low-dose tamoxifen 10 mg daily.  However she developed intense menstrual type cramps that were intolerable during her menstrual cycle.  At baseline she reports that these have been a difficult issue for her to manage for her entire life.  She has previously had to be evaluated in the ER as well as reports losing jobs due to missed time from work.  She is on a regimen of Flexeril and ibuprofen which allow her to  function through her menstrual cramps.  However, none of this was working while she was taking tamoxifen.  Symptoms were so severe that she had to take 2 days off of work and have a sitter to care for her 9-year-old daughter.  She reports that the side effects were intolerable and discontinued tamoxifen.  She is not interested in reintroduction.  She is not interested in ovarian suppression, oophorectomy, or aromatase inhibitor therapy.  She has started taking ivermectin and cannabis supplement and has questions regarding lifestyle, herbal supplements, and complementary medicine to lower her risk of breast cancer recurrence.  She eats healthy, exercises regularly, and has sustained a nearly 100 pound weight loss compared to prior to her original diagnosis..    She is bothered by edema in her right breast since radiation.  She also has a small area of scaling over the right nipple that has not fully resolved since radiation.  She has questions regarding  Seeing Dr. FIERRO       Past Medical History:   Past Medical History:   Diagnosis Date   • Backache    • Blood in stool    • Endometriosis    • Gastritis    • Hemorrhoids    • Ingrown nail of great toe of left foot    • Malignant neoplasm of upper-outer quadrant of right breast in female, estrogen receptor positive 2021   • Tinea        Past Surgical History:   Past Surgical History:   Procedure Laterality Date   • BREAST LUMPECTOMY Right 2022    Rt Breast Lumpectomy - 21   •  SECTION     • OTHER SURGICAL HISTORY      treatment of lower leg fracture       Family History:   Family History   Problem Relation Age of Onset   • Alcohol abuse Mother    • Cancer Mother    • Mental illness Mother    • Heart disease Father    • Fibrocystic breast disease Other    • Osteoporosis Other    • Breast cancer Neg Hx    • Ovarian cancer Neg Hx        Social History:   Social History     Socioeconomic History   • Marital status: Legally    Tobacco  Use   • Smoking status: Former     Packs/day: 0.50     Years: 15.00     Pack years: 7.50     Types: Cigarettes     Quit date: 2020     Years since quitting: 3.4   • Smokeless tobacco: Never   Substance and Sexual Activity   • Alcohol use: No   • Drug use: Never   • Sexual activity: Yes     Partners: Male     Birth control/protection: Condom       Medications:     Current Outpatient Medications:   •  Ascorbic Acid (VITAMIN C GUMMIES PO), Take  by mouth., Disp: , Rfl:   •  busPIRone (BUSPAR) 5 MG tablet, TAKE ONE TO TWO TABLETS BY MOUTH THREE TIMES A DAY AS NEEDED FOR ANXIETY, Disp: 90 tablet, Rfl: 2  •  cyclobenzaprine (FLEXERIL) 5 MG tablet, TAKE 1 TABLET BY MOUTH TWO TIMES A DAY AS NEEDED FOR MUSCLE SPASMS, Disp: 60 tablet, Rfl: 2  •  ferrous sulfate 325 (65 FE) MG tablet, Take 1 tablet by mouth Daily With Breakfast. Take with orange juice or vitamin C, Disp: 30 tablet, Rfl: 5  •  gabapentin (NEURONTIN) 300 MG capsule, TAKE ONE CAPSULE BY MOUTH DAILY AS NEEDED FOR BACK PAIN/SCIATRICA, Disp: 30 capsule, Rfl: 1  •  ibuprofen (ADVIL,MOTRIN) 800 MG tablet, TAKE 1 TABLET BY MOUTH EVERY 6 HOURS AS NEEDED FOR MILD PAIN, Disp: 60 tablet, Rfl: 5  •  Multiple Vitamins-Minerals (WOMENS MULTIVITAMIN + COLLAGEN PO), Take  by mouth., Disp: , Rfl:   •  multivitamin (Multiple Vitamin) tablet tablet, Take 1 tablet by mouth Daily. (Patient not taking: Reported on 4/17/2023), Disp: 30 tablet, Rfl: 5  •  omeprazole (priLOSEC) 40 MG capsule, Take 1 capsule by mouth Daily. (Patient taking differently: Take 1 capsule by mouth Daily. As needed), Disp: 90 capsule, Rfl: 3  •  ondansetron ODT (ZOFRAN-ODT) 8 MG disintegrating tablet, Take 1/2 to 1 tablet by mouth (dissolve under tongue or swallow) every 8 hours as needed for nausea., Disp: 30 tablet, Rfl: 1  •  tamoxifen (NOLVADEX) 10 MG tablet, Take 1 tablet by mouth Daily. Start 4/20/2023 (Patient not taking: Reported on 4/17/2023), Disp: 30 tablet, Rfl: 1  •  traMADol (ULTRAM) 50 MG  "tablet, Take 1 tablet by mouth Daily As Needed for Moderate Pain (pelvic pain)., Disp: 60 tablet, Rfl: 2  •  traZODone (DESYREL) 100 MG tablet, TAKE ONE TO TWO TABLETS BY MOUTH ONCE NIGHTLY 1 HOUR BEFORE BEDTIME AS NEEDED FOR SLEEP, Disp: 60 tablet, Rfl: 2  •  Vitamin A 3 MG (73172 UT) capsule, Take 10,000 Units by mouth Daily. (Patient not taking: Reported on 4/17/2023), Disp: , Rfl:   •  vitamin D (ERGOCALCIFEROL) 1.25 MG (42646 UT) capsule capsule, Take 1 capsule by mouth 1 (One) Time Per Week., Disp: 4 capsule, Rfl: 2  •  vitamin E 200 UNIT capsule, Take 1 capsule by mouth Daily., Disp: , Rfl:     Allergies:   Allergies   Allergen Reactions   • Penicillins Hives       Objective     Vital Signs:   Vitals:    05/30/23 1259   BP: 138/92   Pulse: 55   Resp: 16   Temp: 97.9 °F (36.6 °C)   TempSrc: Infrared   SpO2: 98%   Weight: 84.4 kg (186 lb)   Height: 170.2 cm (67.01\")   PainSc:   3    Body mass index is 29.12 kg/m².   Pain Score    05/30/23 1259   PainSc:   3       ECOG Performance Status: 0    Physical Exam:   General: No acute distress. Well appearing   HEENT: Normocephalic, atraumatic. Sclera anicteric. Masked.  Neck: supple, no adenopathy.   Cardiovascular: regular rate and rhythm,. No murmurs.   Respiratory: Normal rate. Clear to auscultation bilaterally  Abdomen: Soft, nontender, non distended with normoactive bowel sounds  Lymph: no cervical, supraclavicular or axillary adenopathy  Neuro: Alert and oriented x 3. No focal deficits.   Ext: Symmetric, no swelling.   Psych: Euthymic  Breast: Status post right lumpectomy.  Mild dependent edema.  No skin thickening or peau d'orange change.  Mild scaling of her nipple consistent with healing radiation dermatitis.      Laboratory/Imaging Reviewed:   CT CHEST W CONTRAST DIAGNOSTIC, CT ABDOMEN PELVIS W CONTRAST     Date of Exam: 2/13/2023 11:14 AM EST     Indication: Breast cancer, invasive, stage I/II/III, initial workup.     Comparison: None " available.     Technique: Axial CT images were obtained of the chest, abdomen and pelvis after the uneventful intravenous administration of 95 cc Isovue-300.  Reconstructed coronal and sagittal images were also obtained. Automated exposure control and iterative   construction methods were used.     Findings:  Chest:     No suspicious pulmonary nodules or masses are identified. Central airways are grossly patent. Visualized thyroid is unremarkable. No supraclavicular adenopathy is identified. There is a 3.5 cm mass or hematoma in the outer aspect of the right breast.   There is skin thickening over the right breast. There is a mildly prominent right axillary lymph node measuring 1.3 cm in short axis dimension. No mediastinal or hilar adenopathy is identified. Amorphous density in the anterior mediastinal fat may be due   to residual thymic tissue. There are mild degenerative changes in the spine. No aggressive osseous lesions are identified.     Abdomen and pelvis:      The spleen, pancreas, adrenals, kidneys, liver and gallbladder have a grossly normal appearance. There is a small hiatal hernia present. There is no evidence of bowel obstruction, free air or free fluid. The appendix is normal. Right colonic fecal   retention is noted. Probable follicle cyst in the left ovary. Uterus and adnexa are otherwise grossly unremarkable. Urinary bladder is grossly unremarkable. No adenopathy is identified in the abdomen or pelvis. There is normal opacification of the   mesenteric vessels. There is mild degenerative change in the spine. There is an indeterminate 1.1 cm sclerotic lesion in the roof of the right acetabulum. Additional indeterminate sclerotic lesion in the left ilium and acetabular region. Attention to   these areas on bone scan, which has been ordered, is recommended     IMPRESSION:  Impression:  Chest:  1. 3.5 cm lobulated mass in the right breast may be the patient's known primary lesion or postsurgical, and  does not measure simple fluid density. Mildly prominent right axillary lymph node measuring up to 1.3 cm.  2. No evidence of pulmonary metastatic disease.     Abdomen and pelvis:  1. Indeterminate sclerotic lesions in the roof of the right acetabulum and left ilium. Attention to these areas on pending bone scan recommended.     Electronically Signed: Kike Navarrete    2/14/2023 10:38 AM EST    Workstation ID: [id number]    DATE OF EXAM: 2/13/2023 10:22 AM EST     PROCEDURE: NM BONE SCAN WHOLE BODY     INDICATIONS: Breast cancer, invasive, stage I/II/III, initial workup     COMPARISON: CT chest, abdomen and pelvis 2/13/2023 and lumbar spine MRI 5/13/2015. CT abdomen and pelvis 3/13/2016.     TECHNIQUE: The patient received 26.7 mCi of technetium 99m MDP intravenously and 3 hour delayed anterior and posterior whole body bone images were obtained.     FINDINGS:  There is no abnormal MDP uptake to suggest osseous metastatic disease. Specifically the acetabular areas appear normal bilaterally. In addition, sclerotic foci identified within the acetabular roof and adjacent to the right and left acetabula appear   unchanged since 2016 and are consistent with bone islands.      IMPRESSION:  No evidence of osseous metastatic disease.     Electronically Signed: Luis Miguel Yip    2/14/2023 1:31 PM EST    Workstation ID: [id number]  Lab on 05/30/2023   Component Date Value Ref Range Status   • Glucose 05/30/2023 56 (L)  65 - 99 mg/dL Final   • BUN 05/30/2023 10  6 - 20 mg/dL Final   • Creatinine 05/30/2023 0.58  0.57 - 1.00 mg/dL Final   • Sodium 05/30/2023 143  136 - 145 mmol/L Final   • Potassium 05/30/2023 3.9  3.5 - 5.2 mmol/L Final   • Chloride 05/30/2023 106  98 - 107 mmol/L Final   • CO2 05/30/2023 27.0  22.0 - 29.0 mmol/L Final   • Calcium 05/30/2023 9.3  8.6 - 10.5 mg/dL Final   • Total Protein 05/30/2023 6.8  6.0 - 8.5 g/dL Final   • Albumin 05/30/2023 4.3  3.5 - 5.2 g/dL Final   • ALT (SGPT) 05/30/2023 21  1 - 33 U/L Final    • AST (SGOT) 05/30/2023 21  1 - 32 U/L Final   • Alkaline Phosphatase 05/30/2023 71  39 - 117 U/L Final   • Total Bilirubin 05/30/2023 0.4  0.0 - 1.2 mg/dL Final   • Globulin 05/30/2023 2.5  gm/dL Final    Calculated Result   • A/G Ratio 05/30/2023 1.7  g/dL Final   • BUN/Creatinine Ratio 05/30/2023 17.2  7.0 - 25.0 Final   • Anion Gap 05/30/2023 10.0  5.0 - 15.0 mmol/L Final   • eGFR 05/30/2023 113.9  >60.0 mL/min/1.73 Final   • WBC 05/30/2023 5.14  3.40 - 10.80 10*3/mm3 Final   • RBC 05/30/2023 4.41  3.77 - 5.28 10*6/mm3 Final   • Hemoglobin 05/30/2023 13.9  12.0 - 15.9 g/dL Final   • Hematocrit 05/30/2023 42.1  34.0 - 46.6 % Final   • MCV 05/30/2023 95.5  79.0 - 97.0 fL Final   • MCH 05/30/2023 31.5  26.6 - 33.0 pg Final   • MCHC 05/30/2023 33.0  31.5 - 35.7 g/dL Final   • RDW 05/30/2023 11.5 (L)  12.3 - 15.4 % Final   • RDW-SD 05/30/2023 41.1  37.0 - 54.0 fl Final   • MPV 05/30/2023 11.0  6.0 - 12.0 fL Final   • Platelets 05/30/2023 154  140 - 450 10*3/mm3 Final   • Neutrophil % 05/30/2023 68.0  42.7 - 76.0 % Final   • Lymphocyte % 05/30/2023 23.0  19.6 - 45.3 % Final   • Monocyte % 05/30/2023 7.6  5.0 - 12.0 % Final   • Eosinophil % 05/30/2023 0.6  0.3 - 6.2 % Final   • Basophil % 05/30/2023 0.4  0.0 - 1.5 % Final   • Immature Grans % 05/30/2023 0.4  0.0 - 0.5 % Final   • Neutrophils, Absolute 05/30/2023 3.50  1.70 - 7.00 10*3/mm3 Final   • Lymphocytes, Absolute 05/30/2023 1.18  0.70 - 3.10 10*3/mm3 Final   • Monocytes, Absolute 05/30/2023 0.39  0.10 - 0.90 10*3/mm3 Final   • Eosinophils, Absolute 05/30/2023 0.03  0.00 - 0.40 10*3/mm3 Final   • Basophils, Absolute 05/30/2023 0.02  0.00 - 0.20 10*3/mm3 Final   • Immature Grans, Absolute 05/30/2023 0.02  0.00 - 0.05 10*3/mm3 Final       No results found.    Assessment / Plan      Assessment/Plan:     1. Malignant neoplasm of upper-outer quadrant of right breast in female, estrogen receptor positive  -She has a recurrent right breast cancer that is ER positive  and HER2 negative following lumpectomy with omission of adjuvant radiation, chemotherapy, and endocrine therapy.  She is willing to proceed with adjuvant radiation, but declines adjuvant chemotherapy or repeat Oncotype testing.  Initially she also declined endocrine therapy.  We discussed the increased risk of metastasis and breast cancer related mortality with the omission of adjuvant systemic therapy.  We discussed various options for endocrine therapy including ovarian suppression and an aromatase inhibitor which would render her postmenopausal, or trial of tamoxifen, which while sometimes associated with menopausal symptoms, is a partial estrogen receptor agonist and antagonist and does not render a patient postmenopausal.   -She elected to proceed with reduced dose tamoxifen, but has discontinued this due to intolerable side effects.  She is firm in her decision to decline chemotherapy or repeat Oncotype testing.  She declines ovarian suppression, ANJEL/BSO, aromatase number therapy.  She understands that this is not standard of care if it does increase her risk for recurrence, metastasis, and cancer death.  -She will enter active surveillance. She is amenable to continuing follow-up every 6 months.. Discussed signs and symptoms of cancer recurrence which should prompt interim evaluation.    -I have not recommended continuation of ivermectin.  She was referred to oral cancer [employer], [facility] about herb website, and [facility] CAM website  -Repeat labs today notable for normal CBC and CMP.    Follow Up:   6 mo     Hali Gar MD  Hematology and Oncology     I have spent a total of 30 minutes on reviewing test results, preparing to see patient, counseling patient, performing medically appropriate exam and documenting clinical information in the electronic health record.

## 2023-07-21 ENCOUNTER — TELEPHONE (OUTPATIENT)
Dept: INTERNAL MEDICINE | Facility: CLINIC | Age: 43
End: 2023-07-21

## 2023-07-21 NOTE — TELEPHONE ENCOUNTER
Caller: Cameron April Nickie    Relationship: Self    Best call back number: [phone]     What was the call regarding: PATIENT BELIEVES SHE HAS A YEAST INFECTION, SHE HAS HAD ONE BEFORE IN THE PAST. REQUESTING MEDICATION OR OVER THE COUNTER RECOMMENDATIONS.     Is it okay if the provider responds through MyChart: CALL        [facility] [id number] - [address] [place] - [phone]  - [phone]  [phone]

## 2023-07-24 ENCOUNTER — OFFICE VISIT (OUTPATIENT)
Dept: INTERNAL MEDICINE | Facility: CLINIC | Age: 43
End: 2023-07-24
Payer: COMMERCIAL

## 2023-07-24 VITALS
SYSTOLIC BLOOD PRESSURE: 120 MMHG | BODY MASS INDEX: 28.56 KG/M2 | DIASTOLIC BLOOD PRESSURE: 82 MMHG | WEIGHT: 182 LBS | HEIGHT: 67 IN | HEART RATE: 66 BPM | OXYGEN SATURATION: 98 %

## 2023-07-24 DIAGNOSIS — G89.29 CHRONIC BILATERAL LOW BACK PAIN WITH BILATERAL SCIATICA: ICD-10-CM

## 2023-07-24 DIAGNOSIS — M54.41 CHRONIC BILATERAL LOW BACK PAIN WITH BILATERAL SCIATICA: ICD-10-CM

## 2023-07-24 DIAGNOSIS — N76.0 ACUTE VAGINITIS: Primary | ICD-10-CM

## 2023-07-24 DIAGNOSIS — M54.42 CHRONIC BILATERAL LOW BACK PAIN WITH BILATERAL SCIATICA: ICD-10-CM

## 2023-07-24 PROCEDURE — 87661 TRICHOMONAS VAGINALIS AMPLIF: CPT | Performed by: INTERNAL MEDICINE

## 2023-07-24 PROCEDURE — 87798 DETECT AGENT NOS DNA AMP: CPT | Performed by: INTERNAL MEDICINE

## 2023-07-24 PROCEDURE — 87591 N.GONORRHOEAE DNA AMP PROB: CPT | Performed by: INTERNAL MEDICINE

## 2023-07-24 PROCEDURE — 99213 OFFICE O/P EST LOW 20 MIN: CPT | Performed by: INTERNAL MEDICINE

## 2023-07-24 PROCEDURE — 87491 CHLMYD TRACH DNA AMP PROBE: CPT | Performed by: INTERNAL MEDICINE

## 2023-07-24 PROCEDURE — 87801 DETECT AGNT MULT DNA AMPLI: CPT | Performed by: INTERNAL MEDICINE

## 2023-07-24 RX ORDER — FLUCONAZOLE 150 MG/1
150 TABLET ORAL ONCE
Qty: 1 TABLET | Refills: 0 | Status: SHIPPED | OUTPATIENT
Start: 2023-07-24 | End: 2023-07-24

## 2023-07-24 RX ORDER — GABAPENTIN 300 MG/1
CAPSULE ORAL
Qty: 30 CAPSULE | Refills: 0 | Status: SHIPPED | OUTPATIENT
Start: 2023-07-24

## 2023-07-24 NOTE — PROGRESS NOTES
"Subjective   April Nickie Barnes is a 43 y.o. female here for vaginal itching and burning for a few days. No vaginal discharge. No dysuria. Got the sx when she had relations with her boyfriend who lives out of state. She is not concerned for STD but would like to do the full swab.    I have reviewed the patient's relevant medical history and confirmed it is accurate.    I have personally reviewed and performed the ROS. Cecilia Lara MD     Objective   /82 (BP Location: Left arm)   Pulse 66   Ht 170.2 cm (67\")   Wt 82.6 kg (182 lb)   SpO2 98%   BMI 28.51 kg/m²     Physical Exam  Vitals reviewed.   Constitutional:       Appearance: She is well-developed.   Pulmonary:      Effort: Pulmonary effort is normal.   Neurological:      General: No focal deficit present.      Mental Status: She is alert.   Psychiatric:         Behavior: Behavior normal.         Thought Content: Thought content normal.         Judgment: Judgment normal.         Current Outpatient Medications:     Ascorbic Acid (VITAMIN C GUMMIES PO), Take  by mouth., Disp: , Rfl:     busPIRone (BUSPAR) 5 MG tablet, TAKE ONE TO TWO TABLETS BY MOUTH THREE TIMES A DAY AS NEEDED FOR ANXIETY (Patient taking differently: 1 tablet 2 (Two) Times a Day As Needed. TAKE ONE TO TWO TABLETS BY MOUTH THREE TIMES A DAY AS NEEDED FOR ANXIETY), Disp: 90 tablet, Rfl: 2    cyclobenzaprine (FLEXERIL) 5 MG tablet, TAKE 1 TABLET BY MOUTH TWO TIMES A DAY AS NEEDED FOR MUSCLE SPASMS, Disp: 60 tablet, Rfl: 2    ferrous sulfate 325 (65 FE) MG tablet, Take 1 tablet by mouth Daily With Breakfast. Take with orange juice or vitamin C, Disp: 30 tablet, Rfl: 5    gabapentin (NEURONTIN) 300 MG capsule, TAKE 1 CAPSULE BY MOUTH DAILY AS NEEDED FOR BACK PAIN AND SCIATICA, Disp: 30 capsule, Rfl: 0    ibuprofen (ADVIL,MOTRIN) 800 MG tablet, TAKE 1 TABLET BY MOUTH EVERY 6 HOURS AS NEEDED FOR MILD PAIN, Disp: 60 tablet, Rfl: 5    Multiple Vitamins-Minerals (WOMENS " MULTIVITAMIN + COLLAGEN PO), Take  by mouth., Disp: , Rfl:     ondansetron ODT (ZOFRAN-ODT) 8 MG disintegrating tablet, Take 1/2 to 1 tablet by mouth (dissolve under tongue or swallow) every 8 hours as needed for nausea., Disp: 30 tablet, Rfl: 1    traMADol (ULTRAM) 50 MG tablet, Take 1 tablet by mouth Daily As Needed for Moderate Pain (pelvic pain)., Disp: 60 tablet, Rfl: 2    traZODone (DESYREL) 100 MG tablet, TAKE ONE TO TWO TABLETS BY MOUTH ONCE NIGHTLY 1 HOUR BEFORE BEDTIME AS NEEDED FOR SLEEP, Disp: 60 tablet, Rfl: 2    vitamin E 200 UNIT capsule, Take 1 capsule by mouth Daily., Disp: , Rfl:     Assessment & Plan   Diagnoses and all orders for this visit:    1. Acute vaginitis (Primary)  -     fluconazole (Diflucan) 150 MG tablet; Take 1 tablet by mouth 1 (One) Time for 1 dose.  Dispense: 1 tablet; Refill: 0  -     NuSwab VG+ - Swab, Vagina; Future  -     NuSwab VG+ - Swab, Vagina             Cecilia Lara MD

## 2023-07-26 DIAGNOSIS — M51.36 DEGENERATION OF INTERVERTEBRAL DISC OF LUMBAR REGION: ICD-10-CM

## 2023-07-26 DIAGNOSIS — N80.9 ENDOMETRIOSIS: ICD-10-CM

## 2023-07-26 RX ORDER — CYCLOBENZAPRINE HCL 5 MG
TABLET ORAL
Qty: 60 TABLET | Refills: 2 | Status: SHIPPED | OUTPATIENT
Start: 2023-07-26

## 2023-07-27 LAB
A VAGINAE DNA VAG QL NAA+PROBE: NORMAL SCORE
BVAB2 DNA VAG QL NAA+PROBE: NORMAL SCORE
C ALBICANS DNA VAG QL NAA+PROBE: NEGATIVE
C GLABRATA DNA VAG QL NAA+PROBE: NEGATIVE
C TRACH DNA VAG QL NAA+PROBE: NEGATIVE
MEGA1 DNA VAG QL NAA+PROBE: NORMAL SCORE
N GONORRHOEA DNA VAG QL NAA+PROBE: NEGATIVE
T VAGINALIS DNA VAG QL NAA+PROBE: NEGATIVE

## 2023-08-03 DIAGNOSIS — K21.9 GASTROESOPHAGEAL REFLUX DISEASE WITHOUT ESOPHAGITIS: ICD-10-CM

## 2023-08-03 RX ORDER — OMEPRAZOLE 40 MG/1
CAPSULE, DELAYED RELEASE ORAL
Qty: 90 CAPSULE | Refills: 3 | OUTPATIENT
Start: 2023-08-03

## 2023-08-07 ENCOUNTER — OFFICE VISIT (OUTPATIENT)
Dept: INTERNAL MEDICINE | Facility: CLINIC | Age: 43
End: 2023-08-07
Payer: COMMERCIAL

## 2023-08-07 VITALS
TEMPERATURE: 97.4 F | HEART RATE: 67 BPM | BODY MASS INDEX: 28.88 KG/M2 | SYSTOLIC BLOOD PRESSURE: 128 MMHG | RESPIRATION RATE: 16 BRPM | WEIGHT: 184 LBS | OXYGEN SATURATION: 98 % | DIASTOLIC BLOOD PRESSURE: 78 MMHG | HEIGHT: 67 IN

## 2023-08-07 DIAGNOSIS — M77.8 LEFT ELBOW TENDONITIS: Primary | ICD-10-CM

## 2023-08-07 DIAGNOSIS — N80.9 ENDOMETRIOSIS: ICD-10-CM

## 2023-08-07 DIAGNOSIS — M54.41 CHRONIC BILATERAL LOW BACK PAIN WITH BILATERAL SCIATICA: ICD-10-CM

## 2023-08-07 DIAGNOSIS — Z79.899 MEDICATION MANAGEMENT: ICD-10-CM

## 2023-08-07 DIAGNOSIS — G89.29 CHRONIC BILATERAL LOW BACK PAIN WITH BILATERAL SCIATICA: ICD-10-CM

## 2023-08-07 DIAGNOSIS — L72.0 EPIDERMOID CYST: ICD-10-CM

## 2023-08-07 DIAGNOSIS — G47.09 OTHER INSOMNIA: ICD-10-CM

## 2023-08-07 DIAGNOSIS — M54.42 CHRONIC BILATERAL LOW BACK PAIN WITH BILATERAL SCIATICA: ICD-10-CM

## 2023-08-07 PROCEDURE — 99214 OFFICE O/P EST MOD 30 MIN: CPT | Performed by: NURSE PRACTITIONER

## 2023-08-07 PROCEDURE — 1159F MED LIST DOCD IN RCRD: CPT | Performed by: NURSE PRACTITIONER

## 2023-08-07 PROCEDURE — 1160F RVW MEDS BY RX/DR IN RCRD: CPT | Performed by: NURSE PRACTITIONER

## 2023-08-07 RX ORDER — TRAZODONE HYDROCHLORIDE 100 MG/1
TABLET ORAL
Qty: 60 TABLET | Refills: 2 | Status: SHIPPED | OUTPATIENT
Start: 2023-08-07

## 2023-08-07 NOTE — PROGRESS NOTES
"Subjective   Chief Complaint   Patient presents with    Anxiety      April Nickie Barnes is a 43 y.o. female.     The patient is here today for a follow-up.    The patient complains of left elbow pain, which she believes is due to carrying heavy trays. She has been taking ibuprofen and tramadol as needed. She has not tried Voltaren gel. Her pain is exacerbated with certain extension positions and movements.     The patient complains of a cyst on her skin with intermittent soreness, which has been present for years. She has tried to squeeze it, but it returns.      The patient complains of \"breaking out\" around her eye and thinks it is related to inflammation. She has not been seen by a dermatologist in years. She has a family history of skin cancer and is requesting a skin cancer check. The cloudiness in her eyes have improved since losing weight.     The patient complains of soreness in her lymph gland. She underwent a biopsy, which was normal and was last seen by her surgeon in 06/2023. She has a mammogram scheduled in 10/2023 and is scheduled to return to her surgeon in 12/2023. The patient states one of her breasts is permanently more swollen than the other. She has been massaging her breast to help break up and reduce the hardened fluid retention, her surgeon is aware of this.     The patient had a yeast infection, which she treated herself with over-the-counter medication.    She takes gabapentin as needed, which is helpful. The gabapentin helps with sciatica pain as well.     She takes Fiorosol, a multivitamin, vitamin E every day, and tramadol, ibuprofen, gabapentin, cyclobenzaprine as needed, and trazodone as needed.    The patient completed laboratories in 05/2023. She has lost weight by fasting and adds that her blood pressure has improved.           I have reviewed the following portions of the patient's history and confirmed they are accurate: allergies, current medications, past family history, past " "medical history, past social history, past surgical history, and problem list    Review of Systems  Pertinent items are noted in HPI.     Current Outpatient Medications on File Prior to Visit   Medication Sig    Ascorbic Acid (VITAMIN C GUMMIES PO) Take  by mouth.    busPIRone (BUSPAR) 5 MG tablet TAKE ONE TO TWO TABLETS BY MOUTH THREE TIMES A DAY AS NEEDED FOR ANXIETY (Patient taking differently: 1 tablet 2 (Two) Times a Day As Needed. TAKE ONE TO TWO TABLETS BY MOUTH THREE TIMES A DAY AS NEEDED FOR ANXIETY)    cyclobenzaprine (FLEXERIL) 5 MG tablet TAKE ONE TABLET BY MOUTH TWICE A DAY AS NEEDED FOR MUSCLE SPASMS    ferrous sulfate 325 (65 FE) MG tablet Take 1 tablet by mouth Daily With Breakfast. Take with orange juice or vitamin C    gabapentin (NEURONTIN) 300 MG capsule TAKE 1 CAPSULE BY MOUTH DAILY AS NEEDED FOR BACK PAIN AND SCIATICA    ibuprofen (ADVIL,MOTRIN) 800 MG tablet TAKE 1 TABLET BY MOUTH EVERY 6 HOURS AS NEEDED FOR MILD PAIN    Multiple Vitamins-Minerals (WOMENS MULTIVITAMIN + COLLAGEN PO) Take  by mouth.    ondansetron ODT (ZOFRAN-ODT) 8 MG disintegrating tablet Take 1/2 to 1 tablet by mouth (dissolve under tongue or swallow) every 8 hours as needed for nausea.    traMADol (ULTRAM) 50 MG tablet Take 1 tablet by mouth Daily As Needed for Moderate Pain (pelvic pain).    vitamin E 200 UNIT capsule Take 1 capsule by mouth Daily.    [DISCONTINUED] traZODone (DESYREL) 100 MG tablet TAKE ONE TO TWO TABLETS BY MOUTH ONCE NIGHTLY 1 HOUR BEFORE BEDTIME AS NEEDED FOR SLEEP     No current facility-administered medications on file prior to visit.       Objective   Vitals:    08/07/23 1556   BP: 128/78   Pulse: 67   Resp: 16   Temp: 97.4 øF (36.3 øC)   TempSrc: Tympanic   SpO2: 98%   Weight: 83.5 kg (184 lb)   Height: 170.2 cm (67\")     Body mass index is 28.82 kg/mý.    Physical Exam  Vitals reviewed.   Constitutional:       Appearance: Normal appearance. She is well-developed.   HENT:      Head: Normocephalic " and atraumatic.      Nose: Nose normal.   Eyes:      General: Lids are normal.      Conjunctiva/sclera: Conjunctivae normal.      Pupils: Pupils are equal, round, and reactive to light.   Neck:      Thyroid: No thyromegaly.      Trachea: Trachea normal.   Pulmonary:      Effort: Pulmonary effort is normal. No respiratory distress.   Musculoskeletal:      Left elbow: Tenderness present in lateral epicondyle.   Skin:     General: Skin is warm and dry.      Comments: Left chest cyst with blackhead - no redness or drainage.    Neurological:      Mental Status: She is alert and oriented to person, place, and time.      GCS: GCS eye subscore is 4. GCS verbal subscore is 5. GCS motor subscore is 6.   Psychiatric:         Attention and Perception: Attention normal.         Mood and Affect: Mood normal.         Speech: Speech normal.         Behavior: Behavior normal. Behavior is cooperative.       Assessment & Plan   Problem List Items Addressed This Visit          Genitourinary and Reproductive     Endometriosis       Musculoskeletal and Injuries    Low back pain     Other Visit Diagnoses       Left elbow tendonitis    -  Primary    Epidermoid cyst               1. Left elbow tendinitis  - New, unstable.  - Continue ibuprofen.  - Start Voltaren gel.  - I encouraged the patient to purchase an elbow supportive strap.  - I have encouraged the patient to rest her joint.  - If there is no improvement, consider x-ray and referral to orthopedics for possible steroid injection.  - The patient declines prednisone pack today.    2. Endometriosis  - Chronic, stable.  - She will continue tramadol as needed.  - She will continue follow-ups with gynecology.    3. Chronic bilateral low back pain  - Chronic, stable.  - The patient will continue gabapentin and tramadol as needed.  - The patient will continue ibuprofen as needed.  - If no improvement, if her symptoms worsen or has exacerbation, consider lumbar x-ray.    4. Epidermoid  cyst  - Chronic, unstable.  - The patient declines dermatology appointment today due to busy schedule.  - She will contact office back if would like this referral.    Current Outpatient Medications:     Ascorbic Acid (VITAMIN C GUMMIES PO), Take  by mouth., Disp: , Rfl:     busPIRone (BUSPAR) 5 MG tablet, TAKE ONE TO TWO TABLETS BY MOUTH THREE TIMES A DAY AS NEEDED FOR ANXIETY (Patient taking differently: 1 tablet 2 (Two) Times a Day As Needed. TAKE ONE TO TWO TABLETS BY MOUTH THREE TIMES A DAY AS NEEDED FOR ANXIETY), Disp: 90 tablet, Rfl: 2    cyclobenzaprine (FLEXERIL) 5 MG tablet, TAKE ONE TABLET BY MOUTH TWICE A DAY AS NEEDED FOR MUSCLE SPASMS, Disp: 60 tablet, Rfl: 2    Diclofenac Sodium (VOLTAREN) 1 % gel gel, Apply 1 gram topically to indicated area 4 times daily as needed for mild to moderate pain., Disp: 100 g, Rfl: 2    ferrous sulfate 325 (65 FE) MG tablet, Take 1 tablet by mouth Daily With Breakfast. Take with orange juice or vitamin C, Disp: 30 tablet, Rfl: 5    gabapentin (NEURONTIN) 300 MG capsule, TAKE 1 CAPSULE BY MOUTH DAILY AS NEEDED FOR BACK PAIN AND SCIATICA, Disp: 30 capsule, Rfl: 0    ibuprofen (ADVIL,MOTRIN) 800 MG tablet, TAKE 1 TABLET BY MOUTH EVERY 6 HOURS AS NEEDED FOR MILD PAIN, Disp: 60 tablet, Rfl: 5    Multiple Vitamins-Minerals (WOMENS MULTIVITAMIN + COLLAGEN PO), Take  by mouth., Disp: , Rfl:     ondansetron ODT (ZOFRAN-ODT) 8 MG disintegrating tablet, Take 1/2 to 1 tablet by mouth (dissolve under tongue or swallow) every 8 hours as needed for nausea., Disp: 30 tablet, Rfl: 1    traMADol (ULTRAM) 50 MG tablet, Take 1 tablet by mouth Daily As Needed for Moderate Pain (pelvic pain)., Disp: 60 tablet, Rfl: 2    traZODone (DESYREL) 100 MG tablet, TAKE ONE TO TWO TABLETS BY MOUTH ONCE NIGHTLY ONE HOUR BEFORE BEDTIME AS NEEDED FOR SLEEP, Disp: 60 tablet, Rfl: 2    vitamin E 200 UNIT capsule, Take 1 capsule by mouth Daily., Disp: , Rfl:        Plan of care reviewed with the patient at the  conclusion of today's visit.  Education was provided regarding diagnosis, management, and any prescribed or recommended OTC medications.  Patient verbalized understanding of and agreement with management plan.     Return in about 3 months (around 11/7/2023), or if symptoms worsen or fail to improve, for Follow-up.      Transcribed from ambient dictation for [facility] [place] by Melba Mon.  08/08/23   09:44 EDT    Patient or patient representative verbalized consent to the visit recording.  I have personally performed the services described in this document as transcribed by the above individual, and it is both accurate and complete.

## 2023-08-15 LAB
DRUGS UR: NORMAL
Lab: NORMAL

## 2023-08-24 DIAGNOSIS — M54.42 CHRONIC BILATERAL LOW BACK PAIN WITH BILATERAL SCIATICA: ICD-10-CM

## 2023-08-24 DIAGNOSIS — G89.29 CHRONIC BILATERAL LOW BACK PAIN WITH BILATERAL SCIATICA: ICD-10-CM

## 2023-08-24 DIAGNOSIS — M54.41 CHRONIC BILATERAL LOW BACK PAIN WITH BILATERAL SCIATICA: ICD-10-CM

## 2023-08-25 RX ORDER — GABAPENTIN 300 MG/1
300 CAPSULE ORAL DAILY PRN
Qty: 30 CAPSULE | Refills: 2 | Status: SHIPPED | OUTPATIENT
Start: 2023-08-25

## 2023-08-30 ENCOUNTER — TELEPHONE (OUTPATIENT)
Dept: ONCOLOGY | Facility: CLINIC | Age: 43
End: 2023-08-30
Payer: COMMERCIAL

## 2023-08-30 DIAGNOSIS — Z17.0 MALIGNANT NEOPLASM OF UPPER-OUTER QUADRANT OF RIGHT BREAST IN FEMALE, ESTROGEN RECEPTOR POSITIVE: Primary | ICD-10-CM

## 2023-08-30 DIAGNOSIS — R51.9 SEVERE FRONTAL HEADACHES: ICD-10-CM

## 2023-08-30 DIAGNOSIS — C50.411 MALIGNANT NEOPLASM OF UPPER-OUTER QUADRANT OF RIGHT BREAST IN FEMALE, ESTROGEN RECEPTOR POSITIVE: Primary | ICD-10-CM

## 2023-08-30 NOTE — TELEPHONE ENCOUNTER
"Patient stated she has been having neck and shoulder pain (right greater than left) for about one month.  Patient stated it started out as muscle tension and she was able to massage it out.  She stated that she had the first migraine of her life about 2 weeks ago. She stated she got a headache, then she could only open one eye, had vision changes, dizziness and nausea.  Patient stated she went home and took a tramadol and ibuprofen and went to bed.  She said when she got up, she felt fine.  Patient stated she began having another headache one week ago and took tramadol and ibuprofen again before symptoms got worse and the pain then went away.  Patient stated last night she began having neck and shoulder pain again and she was rubbing her neck when she felt a lump at the base of her skull where her skull meets her neck.  Patient stated it is not painful and does not hurt when pressing on it.  Patient stated it is soft and \"moveable.\"  Patient stated she has had \"permanent swelling in right breast area since surgery\" and that she has had discomfort and soreness under the arm in the lymph node area since surgery, unchanged.  Patient denied any chest pressure/pain, SOA. Notified patient that Dr. Gar will be notified and she will receive a return call with MD recommendations today.  Patient verbalized understanding.  Dr. Gar notified of the above.    "

## 2023-08-30 NOTE — TELEPHONE ENCOUNTER
Called patient back and advised per Dr. Gar that we can do an MRI Brain and follow up with results or she can monitor and if she continues to have headaches, she can let this office know.  Patient verbalized understanding and elected to proceed with MRI Brain and requested to see Dr. Gar next Tuesday for her peace of mind.  Dr. Gar notified and agreed.  Message sent to scheduling.

## 2023-09-03 DIAGNOSIS — D50.8 IRON DEFICIENCY ANEMIA SECONDARY TO INADEQUATE DIETARY IRON INTAKE: ICD-10-CM

## 2023-09-05 RX ORDER — MULTIVITAMIN
TABLET ORAL
Qty: 30 TABLET | Refills: 5 | OUTPATIENT
Start: 2023-09-05

## 2023-09-05 RX ORDER — FERROUS SULFATE 325(65) MG
TABLET ORAL
Qty: 30 TABLET | Refills: 5 | Status: SHIPPED | OUTPATIENT
Start: 2023-09-05

## 2023-09-12 ENCOUNTER — TELEPHONE (OUTPATIENT)
Dept: ONCOLOGY | Facility: CLINIC | Age: 43
End: 2023-09-12

## 2023-09-12 NOTE — TELEPHONE ENCOUNTER
Caller: Alena Barnes    Relationship to patient: Self    Best call back number: [phone]    Patient is needing: TO R/S 9-19-23 F/U APPT TO 10-24-23 AFTER HER SCAN.

## 2023-10-10 ENCOUNTER — HOSPITAL ENCOUNTER (OUTPATIENT)
Dept: MAMMOGRAPHY | Facility: HOSPITAL | Age: 43
Discharge: HOME | End: 2023-10-10
Admitting: SURGERY
Payer: COMMERCIAL

## 2023-10-10 DIAGNOSIS — C50.411 MALIGNANT NEOPLASM OF UPPER-OUTER QUADRANT OF RIGHT FEMALE BREAST, UNSPECIFIED ESTROGEN RECEPTOR STATUS: ICD-10-CM

## 2023-10-10 PROCEDURE — G0279 TOMOSYNTHESIS, MAMMO: HCPCS

## 2023-10-10 PROCEDURE — 77066 DX MAMMO INCL CAD BI: CPT | Performed by: RADIOLOGY

## 2023-10-10 PROCEDURE — 77066 DX MAMMO INCL CAD BI: CPT

## 2023-10-10 PROCEDURE — 77062 BREAST TOMOSYNTHESIS BI: CPT | Performed by: RADIOLOGY

## 2023-10-16 ENCOUNTER — HOSPITAL ENCOUNTER (OUTPATIENT)
Dept: MRI IMAGING | Facility: HOSPITAL | Age: 43
Discharge: HOME | End: 2023-10-16
Admitting: INTERNAL MEDICINE
Payer: COMMERCIAL

## 2023-10-16 DIAGNOSIS — Z17.0 MALIGNANT NEOPLASM OF UPPER-OUTER QUADRANT OF RIGHT BREAST IN FEMALE, ESTROGEN RECEPTOR POSITIVE: ICD-10-CM

## 2023-10-16 DIAGNOSIS — R51.9 SEVERE FRONTAL HEADACHES: ICD-10-CM

## 2023-10-16 DIAGNOSIS — C50.411 MALIGNANT NEOPLASM OF UPPER-OUTER QUADRANT OF RIGHT BREAST IN FEMALE, ESTROGEN RECEPTOR POSITIVE: ICD-10-CM

## 2023-10-16 PROCEDURE — 70553 MRI BRAIN STEM W/O & W/DYE: CPT

## 2023-10-16 PROCEDURE — 0 GADOBENATE DIMEGLUMINE 529 MG/ML SOLUTION: Performed by: INTERNAL MEDICINE

## 2023-10-16 PROCEDURE — A9577 INJ MULTIHANCE: HCPCS | Performed by: INTERNAL MEDICINE

## 2023-10-16 RX ORDER — MULTIVITAMIN
1 TABLET ORAL DAILY
Qty: 30 TABLET | Refills: 5 | Status: SHIPPED | OUTPATIENT
Start: 2023-10-16

## 2023-10-16 RX ADMIN — GADOBENATE DIMEGLUMINE 17 ML: 529 INJECTION, SOLUTION INTRAVENOUS at 15:28

## 2023-10-17 ENCOUNTER — OFFICE VISIT (OUTPATIENT)
Dept: ONCOLOGY | Facility: CLINIC | Age: 43
End: 2023-10-17
Payer: COMMERCIAL

## 2023-10-17 VITALS
HEIGHT: 67 IN | SYSTOLIC BLOOD PRESSURE: 157 MMHG | OXYGEN SATURATION: 99 % | RESPIRATION RATE: 16 BRPM | DIASTOLIC BLOOD PRESSURE: 77 MMHG | WEIGHT: 185 LBS | TEMPERATURE: 97.1 F | HEART RATE: 55 BPM | BODY MASS INDEX: 29.03 KG/M2

## 2023-10-17 DIAGNOSIS — C50.411 MALIGNANT NEOPLASM OF UPPER-OUTER QUADRANT OF RIGHT BREAST IN FEMALE, ESTROGEN RECEPTOR POSITIVE: Primary | ICD-10-CM

## 2023-10-17 DIAGNOSIS — Z17.0 MALIGNANT NEOPLASM OF UPPER-OUTER QUADRANT OF RIGHT BREAST IN FEMALE, ESTROGEN RECEPTOR POSITIVE: Primary | ICD-10-CM

## 2023-10-17 NOTE — PROGRESS NOTES
Hematology and Oncology [place]  Office number [phone]    Fax number [phone]     Follow up     Date: 10/17/23     Patient Name: Alena Barnes  MRN: [id number]  : 1980    Referring Physician: Dr. Honorio Noguera    Chief Complaint: recurrent right breast cancer    Cancer Staging: recurrent IA    History of Present Illness: Alena Barnes is a pleasant 43 y.o. female who presents today for evaluation of recurrent right breast cancer. She is accompanied by her supportive mom.     She has a history of a grade two 1.4 cm stage Ia invasive ductal carcinoma of the right breast (ER 95%, IL 90%, HER2/megan negative) status post lumpectomy and sentinel lymph node biopsy () in May 2021.  Margins were negative by less than 1 mm. Oncotype was 25.  Patient declined all adjuvant therapy including radiation and endocrine therapy.    She was found to have a local recurrence and underwent repeat lumpectomy showing grade 2 invasive ductal carcinoma (ER 95%, IL 80%, and HER2/megan negative).  2 sentinel lymph nodes were negative.  Final margins were negative.  She is planning adjuvant radiation.     She initially tells me that she has no interest in chemotherapy or endocrine therapy.  On further exploration her main concern is experiencing permanent menopause.  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    Interval history:  She is here for follow-up and test results.  She remains off of tamoxifen therapy and continues to decline further endocrine therapy.  She missed her period in July and then had a full menstrual cycle  and .  Prior to July her menses had been relatively regular.  She denies any hot flashes or other menopausal symptoms.  She notes new onset headaches since 2023.  She believes she has had 6-7 episodes of severe headaches.  These initially started in the occipital region and then  spread anteriorly.  Generally she takes ibuprofen and tramadol in order to control them.  She did have 1 where she had to leave work which was associated with nausea.  She has no preceding history of migraines.  She does have a history of sciatica that periodically flares but no other new areas of pain.      She reports she is taking RSO. No other supplements.   She continues to have pain/tenderness and seroma at her lumpectomy site.  Massage helps.  She denies any other new persistent symptoms.    Past Medical History:   Past Medical History:   Diagnosis Date    Backache     Blood in stool     Endometriosis     Gastritis     Hemorrhoids     Hx of radiation therapy     Ingrown nail of great toe of left foot     Malignant neoplasm of upper-outer quadrant of right breast in female, estrogen receptor positive 2021    Tinea        Past Surgical History:   Past Surgical History:   Procedure Laterality Date    BREAST LUMPECTOMY Right 2022    Rt Breast Lumpectomy - 21     SECTION      OTHER SURGICAL HISTORY      treatment of lower leg fracture       Family History:   Family History   Problem Relation Age of Onset    Alcohol abuse Mother     Cancer Mother     Mental illness Mother     Heart disease Father     Fibrocystic breast disease Other     Osteoporosis Other     Breast cancer Neg Hx     Ovarian cancer Neg Hx        Social History:   Social History     Socioeconomic History    Marital status: Legally    Tobacco Use    Smoking status: Former     Packs/day: 0.50     Years: 15.00     Additional pack years: 0.00     Total pack years: 7.50     Types: Cigarettes     Quit date:      Years since quitting: 3.7     Passive exposure: Past    Smokeless tobacco: Never   Vaping Use    Vaping Use: Never used   Substance and Sexual Activity    Alcohol use: No    Drug use: Never    Sexual activity: Yes     Partners: Male     Birth control/protection: Condom       Medications:     Current Outpatient  Medications:     Ascorbic Acid (VITAMIN C GUMMIES PO), Take  by mouth., Disp: , Rfl:     busPIRone (BUSPAR) 5 MG tablet, TAKE ONE TO TWO TABLETS BY MOUTH THREE TIMES A DAY AS NEEDED FOR ANXIETY (Patient taking differently: 1 tablet 2 (Two) Times a Day As Needed. TAKE ONE TO TWO TABLETS BY MOUTH THREE TIMES A DAY AS NEEDED FOR ANXIETY), Disp: 90 tablet, Rfl: 2    cyclobenzaprine (FLEXERIL) 5 MG tablet, TAKE ONE TABLET BY MOUTH TWICE A DAY AS NEEDED FOR MUSCLE SPASMS, Disp: 60 tablet, Rfl: 2    Diclofenac Sodium (VOLTAREN) 1 % gel gel, Apply 1 gram topically to indicated area 4 times daily as needed for mild to moderate pain., Disp: 100 g, Rfl: 2    FeroSul 325 (65 Fe) MG tablet, TAKE ONE TABLET BY MOUTH DAILY WITH BREAKFAST - TAKE WITH ORANGE JUICE OR VITAMIN C., Disp: 30 tablet, Rfl: 5    gabapentin (NEURONTIN) 300 MG capsule, Take 1 capsule by mouth Daily As Needed (as needed for back pain)., Disp: 30 capsule, Rfl: 2    ibuprofen (ADVIL,MOTRIN) 800 MG tablet, TAKE 1 TABLET BY MOUTH EVERY 6 HOURS AS NEEDED FOR MILD PAIN, Disp: 60 tablet, Rfl: 5    Multiple Vitamins-Minerals (WOMENS MULTIVITAMIN + COLLAGEN PO), Take  by mouth., Disp: , Rfl:     Multivitamin tablet tablet, TAKE ONE TABLET BY MOUTH DAILY, Disp: 30 tablet, Rfl: 5    ondansetron ODT (ZOFRAN-ODT) 8 MG disintegrating tablet, Take 1/2 to 1 tablet by mouth (dissolve under tongue or swallow) every 8 hours as needed for nausea., Disp: 30 tablet, Rfl: 1    traMADol (ULTRAM) 50 MG tablet, Take 1 tablet by mouth Daily As Needed for Moderate Pain (pelvic pain)., Disp: 60 tablet, Rfl: 2    traZODone (DESYREL) 100 MG tablet, TAKE ONE TO TWO TABLETS BY MOUTH ONCE NIGHTLY ONE HOUR BEFORE BEDTIME AS NEEDED FOR SLEEP, Disp: 60 tablet, Rfl: 2    vitamin E 200 UNIT capsule, Take 1 capsule by mouth Daily., Disp: , Rfl:   No current facility-administered medications for this visit.    Allergies:   Allergies   Allergen Reactions    Penicillins Hives       Objective     Vital  "Signs:   Vitals:    10/17/23 1418   BP: 157/77   Pulse: 55   Resp: 16   Temp: 97.1 °F (36.2 °C)   TempSrc: Infrared   SpO2: 99%   Weight: 83.9 kg (185 lb)   Height: 170.2 cm (67.01\")   PainSc: 0-No pain    Body mass index is 28.97 kg/m².   Pain Score    10/17/23 1418   PainSc: 0-No pain       ECOG Performance Status: 0    Physical Exam:   General: No acute distress. Well appearing   HEENT: Normocephalic, atraumatic. Sclera anicteric.   Neck: supple, no adenopathy.   Cardiovascular: regular rate and rhythm. No murmurs.   Respiratory: Normal rate. Clear to auscultation bilaterally  Abdomen: Soft, nontender, non distended with normoactive bowel sounds  Lymph: no cervical, supraclavicular or axillary adenopathy  Neuro: Alert and oriented x 3. No focal deficits.   Ext: Symmetric, no swelling.   Psych: Euthymic  Breast: Status post right lumpectomy.  Mild dependent edema.  No palpable masses bilaterally.      Laboratory/Imaging Reviewed:   CT CHEST W CONTRAST DIAGNOSTIC, CT ABDOMEN PELVIS W CONTRAST     Date of Exam: 2/13/2023 11:14 AM EST     Indication: Breast cancer, invasive, stage I/II/III, initial workup.     Comparison: None available.     Technique: Axial CT images were obtained of the chest, abdomen and pelvis after the uneventful intravenous administration of 95 cc Isovue-300.  Reconstructed coronal and sagittal images were also obtained. Automated exposure control and iterative   construction methods were used.     Findings:  Chest:     No suspicious pulmonary nodules or masses are identified. Central airways are grossly patent. Visualized thyroid is unremarkable. No supraclavicular adenopathy is identified. There is a 3.5 cm mass or hematoma in the outer aspect of the right breast.   There is skin thickening over the right breast. There is a mildly prominent right axillary lymph node measuring 1.3 cm in short axis dimension. No mediastinal or hilar adenopathy is identified. Amorphous density in the anterior " mediastinal fat may be due   to residual thymic tissue. There are mild degenerative changes in the spine. No aggressive osseous lesions are identified.     Abdomen and pelvis:      The spleen, pancreas, adrenals, kidneys, liver and gallbladder have a grossly normal appearance. There is a small hiatal hernia present. There is no evidence of bowel obstruction, free air or free fluid. The appendix is normal. Right colonic fecal   retention is noted. Probable follicle cyst in the left ovary. Uterus and adnexa are otherwise grossly unremarkable. Urinary bladder is grossly unremarkable. No adenopathy is identified in the abdomen or pelvis. There is normal opacification of the   mesenteric vessels. There is mild degenerative change in the spine. There is an indeterminate 1.1 cm sclerotic lesion in the roof of the right acetabulum. Additional indeterminate sclerotic lesion in the left ilium and acetabular region. Attention to   these areas on bone scan, which has been ordered, is recommended     IMPRESSION:  Impression:  Chest:  1. 3.5 cm lobulated mass in the right breast may be the patient's known primary lesion or postsurgical, and does not measure simple fluid density. Mildly prominent right axillary lymph node measuring up to 1.3 cm.  2. No evidence of pulmonary metastatic disease.     Abdomen and pelvis:  1. Indeterminate sclerotic lesions in the roof of the right acetabulum and left ilium. Attention to these areas on pending bone scan recommended.     Electronically Signed: Kike Navarrete    2/14/2023 10:38 AM EST    Workstation ID: [id number]    DATE OF EXAM: 2/13/2023 10:22 AM EST     PROCEDURE: NM BONE SCAN WHOLE BODY     INDICATIONS: Breast cancer, invasive, stage I/II/III, initial workup     COMPARISON: CT chest, abdomen and pelvis 2/13/2023 and lumbar spine MRI 5/13/2015. CT abdomen and pelvis 3/13/2016.     TECHNIQUE: The patient received 26.7 mCi of technetium 99m MDP intravenously and 3 hour delayed anterior and  posterior whole body bone images were obtained.     FINDINGS:  There is no abnormal MDP uptake to suggest osseous metastatic disease. Specifically the acetabular areas appear normal bilaterally. In addition, sclerotic foci identified within the acetabular roof and adjacent to the right and left acetabula appear   unchanged since 2016 and are consistent with bone islands.      IMPRESSION:  No evidence of osseous metastatic disease.     Electronically Signed: Luis Miguel Yip    2/14/2023 1:31 PM EST    Workstation ID: [id number]  No visits with results within 2 Week(s) from this visit.   Latest known visit with results is:   Orders Only on 08/09/2023   Component Date Value Ref Range Status    Report Summary 08/09/2023 FINAL   Final    Comment: ====================================================================  [facility] COMP DRUG ANALYSIS,[facility]  ====================================================================  Test                             Result       Flag       Units  Drug Present    Carboxy-THC                    1466                    ng/mg creat     Carboxy-THC is a metabolite of tetrahydrocannabinol (THC). Source     of THC is most commonly herbal marijuana or marijuana-based     products, but THC is also present in a scheduled prescription     medication. Trace amounts of THC can be present in hemp and     cannabidiol (CBD) products. This test is not intended to     distinguish between delta-9-tetrahydrocannabinol, the predominant     form of THC in most herbal or marijuana-based products, and     delta-8-tetrahydrocannabinol.    Tramadol                       4679                    ng/mg creat    O-Desmethyltramadol            2491                    ng/mg creat    N-Desmethyltramadol            2646                                               ng/mg creat     Source of tramadol is a prescription medication.     O-desmethyltramadol and N-desmethyltramadol are expected     metabolites of  tramadol.  ====================================================================  Test                      Result    Flag   Units      Ref Range    Creatinine              67               mg/dL      >=20  ====================================================================  Declared Medications:   Medication list was not provided.  ====================================================================  For clinical consultation, please call [phone].  ====================================================================      Please note 08/09/2023 Comment   Final    Comment: The date and/or time of collection was not indicated on the  requisition as required by state and federal law.  The date of  receipt of the specimen was used as the collection date if not  supplied.         MRI Brain With & Without Contrast    Result Date: 10/16/2023  Narrative: MRI BRAIN W WO CONTRAST Date of Exam: 10/16/2023 2:14 PM EDT Indication: breast cancer with headache.  Comparison: None available. Technique:  Routine multiplanar/multisequence sequence images of the brain were obtained before and after the uneventful administration of 17 mL Multihance. Findings: No acute infarct is present on diffusion weighted sequences. Midline structures are normal and the craniocervical junction appears satisfactory. Gray-white differentiation is maintained and there is no evidence of intracranial hemorrhage, mass or mass effect. The ventricles are normal in size and configuration. The orbits are normal. The paranasal sinuses are clear. There is no abnormal brain parenchymal enhancement.     Impression: Impression: Normal contrast-enhanced MRI of the brain as above. There is no evidence of intracranial metastatic disease and no acute intracranial findings are present. Electronically Signed: Alex Bullock MD  10/16/2023 3:35 PM EDT  Workstation ID: [id number]    Mammo Diagnostic Digital Tomosynthesis Bilateral With CAD    Result Date:  10/10/2023  Narrative: EXAM: MAMMO DIAGNOSTIC DIGITAL TOMOSYNTHESIS BILATERAL W CAD-  DATE:10/10/2023 3:06 PM  INDICATION: Patient is a 43-year-old female who presents for diagnostic mammogram for history of postlumpectomy protocol with most recent lumpectomy on the right in December 2022. Patient has since had radiation treatment.  COMPARISON: Comparison is made to multiple prior studies dating back to 3/15/2021.  TECHNIQUE: 2D/3D CC and MLO views of each breast were obtained.  FINDINGS:  The breast tissue is heterogeneously dense.  Right breast: Expected post lumpectomy changes are noted in the upper outer quadrant the right breast. On today's exam there is mild diffuse skin thickening in keeping with post radiation change. No new or suspicious masses, calcifications, or areas of distortion are seen.  Left breast: No new or suspicious masses, calcifications, or areas of distortion are seen. The parenchymal pattern is stable.      Impression: 1. Right breast: Expected post breast conservation changes in the right breast. Recommendation at this time is for 6-month follow-up diagnostic right mammogram to ensure stability/establish new baseline post lumpectomy protocol. 2. Left breast: No suspicious abnormality is seen. Recommendation is for continued routine annual screening mammogram. 3. Today's findings and recommendations were discussed with the patient at the time of the examination by the breast care team.  OVERALL ASSESSMENT: BI-RADS Category 3: Probably benign. Recommend short-term follow-up diagnostic right mammogram in 6 months.  ________________________________________________________________________ _______ Physicians Order  Diagnostic right mammogram in 6-month.  Diagnosis: Postlumpectomy protocol.  This report was finalized on 10/10/2023 3:08 PM by Pema Almeida MD.       Assessment / Plan      Assessment/Plan:     1. Malignant neoplasm of upper-outer quadrant of right breast in female, estrogen  receptor positive  -She has a recurrent right breast cancer that is ER positive and HER2 negative following lumpectomy with omission of adjuvant radiation, chemotherapy, and endocrine therapy.  She is willing to proceed with adjuvant radiation, but declines adjuvant chemotherapy or repeat Oncotype testing.  Initially she also declined endocrine therapy.  We discussed the increased risk of metastasis and breast cancer related mortality with the omission of adjuvant systemic therapy.  We discussed various options for endocrine therapy including ovarian suppression and an aromatase inhibitor or tamoxifen.   -She elected to proceed with reduced dose tamoxifen, but has discontinued this due to intolerable side effects.  She is firm in her decision to decline further tamoxifen, ovarian suppression, ANJEL/BSO, aromatase number therapy.    -ANNE MARIE on exam and recent mammogram which we reviewed.   -I ordered repeat labs including CBC/CMP  -We discussed signs and symptoms that should prompt interim evaluation.  Otherwise, she plans to follow-up in 6 months.    2. Occipital headaches  -Reviewed negative brain MRI.   -Symptoms are not daily.  We discussed follow-up or consultation with PCP/neurology if symptoms worsen/persist.    Follow Up:   6 mo     Hali Gar MD  Hematology and Oncology     I have spent a total of 30 minutes on the day of service including time before, during, and after the office visit on reviewing test results, preparing to see patient, counseling patient, performing medically appropriate exam and documenting clinical information in the electronic health record.

## 2023-10-30 ENCOUNTER — LAB (OUTPATIENT)
Dept: INTERNAL MEDICINE | Facility: CLINIC | Age: 43
End: 2023-10-30
Payer: COMMERCIAL

## 2023-10-30 ENCOUNTER — OFFICE VISIT (OUTPATIENT)
Dept: INTERNAL MEDICINE | Facility: CLINIC | Age: 43
End: 2023-10-30
Payer: COMMERCIAL

## 2023-10-30 VITALS
BODY MASS INDEX: 29.19 KG/M2 | HEIGHT: 67 IN | WEIGHT: 186 LBS | HEART RATE: 74 BPM | OXYGEN SATURATION: 98 % | DIASTOLIC BLOOD PRESSURE: 82 MMHG | SYSTOLIC BLOOD PRESSURE: 132 MMHG

## 2023-10-30 DIAGNOSIS — M54.42 CHRONIC BILATERAL LOW BACK PAIN WITH BILATERAL SCIATICA: ICD-10-CM

## 2023-10-30 DIAGNOSIS — Z17.0 MALIGNANT NEOPLASM OF UPPER-OUTER QUADRANT OF RIGHT BREAST IN FEMALE, ESTROGEN RECEPTOR POSITIVE: ICD-10-CM

## 2023-10-30 DIAGNOSIS — G89.29 CHRONIC BILATERAL LOW BACK PAIN WITH BILATERAL SCIATICA: ICD-10-CM

## 2023-10-30 DIAGNOSIS — E55.9 VITAMIN D DEFICIENCY: ICD-10-CM

## 2023-10-30 DIAGNOSIS — N80.9 ENDOMETRIOSIS: ICD-10-CM

## 2023-10-30 DIAGNOSIS — E55.9 VITAMIN D DEFICIENCY: Primary | ICD-10-CM

## 2023-10-30 DIAGNOSIS — G43.009 MIGRAINE WITHOUT AURA AND WITHOUT STATUS MIGRAINOSUS, NOT INTRACTABLE: Primary | ICD-10-CM

## 2023-10-30 DIAGNOSIS — M54.41 CHRONIC BILATERAL LOW BACK PAIN WITH BILATERAL SCIATICA: ICD-10-CM

## 2023-10-30 DIAGNOSIS — C50.411 MALIGNANT NEOPLASM OF UPPER-OUTER QUADRANT OF RIGHT BREAST IN FEMALE, ESTROGEN RECEPTOR POSITIVE: ICD-10-CM

## 2023-10-30 LAB
ALBUMIN SERPL-MCNC: 4.4 G/DL (ref 3.5–5.2)
ALBUMIN/GLOB SERPL: 1.8 G/DL
ALP SERPL-CCNC: 73 U/L (ref 39–117)
ALT SERPL W P-5'-P-CCNC: 22 U/L (ref 1–33)
AMPHET+METHAMPHET UR QL: NEGATIVE
AMPHETAMINES UR QL: NEGATIVE
ANION GAP SERPL CALCULATED.3IONS-SCNC: 9 MMOL/L (ref 5–15)
AST SERPL-CCNC: 31 U/L (ref 1–32)
BARBITURATES UR QL SCN: NEGATIVE
BASOPHILS # BLD AUTO: 0.03 10*3/MM3 (ref 0–0.2)
BASOPHILS NFR BLD AUTO: 0.5 % (ref 0–1.5)
BENZODIAZ UR QL SCN: NEGATIVE
BILIRUB SERPL-MCNC: 0.5 MG/DL (ref 0–1.2)
BUN SERPL-MCNC: 10 MG/DL (ref 6–20)
BUN/CREAT SERPL: 16.7 (ref 7–25)
BUPRENORPHINE SERPL-MCNC: NEGATIVE NG/ML
CALCIUM SPEC-SCNC: 9.6 MG/DL (ref 8.6–10.5)
CANNABINOIDS SERPL QL: POSITIVE
CHLORIDE SERPL-SCNC: 105 MMOL/L (ref 98–107)
CO2 SERPL-SCNC: 25 MMOL/L (ref 22–29)
COCAINE UR QL: NEGATIVE
CREAT SERPL-MCNC: 0.6 MG/DL (ref 0.57–1)
DEPRECATED RDW RBC AUTO: 43 FL (ref 37–54)
EGFRCR SERPLBLD CKD-EPI 2021: 113 ML/MIN/1.73
EOSINOPHIL # BLD AUTO: 0.06 10*3/MM3 (ref 0–0.4)
EOSINOPHIL NFR BLD AUTO: 1.1 % (ref 0.3–6.2)
ERYTHROCYTE [DISTWIDTH] IN BLOOD BY AUTOMATED COUNT: 12 % (ref 12.3–15.4)
FENTANYL UR-MCNC: NEGATIVE NG/ML
GLOBULIN UR ELPH-MCNC: 2.5 GM/DL
GLUCOSE SERPL-MCNC: 71 MG/DL (ref 65–99)
HCT VFR BLD AUTO: 41.1 % (ref 34–46.6)
HGB BLD-MCNC: 13.6 G/DL (ref 12–15.9)
IMM GRANULOCYTES # BLD AUTO: 0.02 10*3/MM3 (ref 0–0.05)
IMM GRANULOCYTES NFR BLD AUTO: 0.4 % (ref 0–0.5)
LYMPHOCYTES # BLD AUTO: 1.33 10*3/MM3 (ref 0.7–3.1)
LYMPHOCYTES NFR BLD AUTO: 23.4 % (ref 19.6–45.3)
MCH RBC QN AUTO: 32.3 PG (ref 26.6–33)
MCHC RBC AUTO-ENTMCNC: 33.1 G/DL (ref 31.5–35.7)
MCV RBC AUTO: 97.6 FL (ref 79–97)
METHADONE UR QL SCN: NEGATIVE
MONOCYTES # BLD AUTO: 0.48 10*3/MM3 (ref 0.1–0.9)
MONOCYTES NFR BLD AUTO: 8.4 % (ref 5–12)
NEUTROPHILS NFR BLD AUTO: 3.77 10*3/MM3 (ref 1.7–7)
NEUTROPHILS NFR BLD AUTO: 66.2 % (ref 42.7–76)
NRBC BLD AUTO-RTO: 0 /100 WBC (ref 0–0.2)
OPIATES UR QL: NEGATIVE
OXYCODONE UR QL SCN: NEGATIVE
PCP UR QL SCN: NEGATIVE
PLATELET # BLD AUTO: 192 10*3/MM3 (ref 140–450)
PMV BLD AUTO: 12.4 FL (ref 6–12)
POTASSIUM SERPL-SCNC: 4 MMOL/L (ref 3.5–5.2)
PROPOXYPH UR QL: NEGATIVE
PROT SERPL-MCNC: 6.9 G/DL (ref 6–8.5)
RBC # BLD AUTO: 4.21 10*6/MM3 (ref 3.77–5.28)
SODIUM SERPL-SCNC: 139 MMOL/L (ref 136–145)
TRICYCLICS UR QL SCN: NEGATIVE
WBC NRBC COR # BLD: 5.69 10*3/MM3 (ref 3.4–10.8)

## 2023-10-30 PROCEDURE — 1159F MED LIST DOCD IN RCRD: CPT | Performed by: NURSE PRACTITIONER

## 2023-10-30 PROCEDURE — 85025 COMPLETE CBC W/AUTO DIFF WBC: CPT | Performed by: INTERNAL MEDICINE

## 2023-10-30 PROCEDURE — 80307 DRUG TEST PRSMV CHEM ANLYZR: CPT | Performed by: NURSE PRACTITIONER

## 2023-10-30 PROCEDURE — 36415 COLL VENOUS BLD VENIPUNCTURE: CPT | Performed by: NURSE PRACTITIONER

## 2023-10-30 PROCEDURE — 82306 VITAMIN D 25 HYDROXY: CPT | Performed by: NURSE PRACTITIONER

## 2023-10-30 PROCEDURE — 99214 OFFICE O/P EST MOD 30 MIN: CPT | Performed by: NURSE PRACTITIONER

## 2023-10-30 PROCEDURE — 80053 COMPREHEN METABOLIC PANEL: CPT | Performed by: INTERNAL MEDICINE

## 2023-10-30 PROCEDURE — 1160F RVW MEDS BY RX/DR IN RCRD: CPT | Performed by: NURSE PRACTITIONER

## 2023-10-30 RX ORDER — GABAPENTIN 300 MG/1
300 CAPSULE ORAL DAILY PRN
Qty: 30 CAPSULE | Refills: 2 | Status: CANCELLED | OUTPATIENT
Start: 2023-10-30

## 2023-10-30 RX ORDER — RIZATRIPTAN BENZOATE 10 MG/1
10 TABLET ORAL ONCE AS NEEDED
Qty: 9 TABLET | Refills: 1 | Status: SHIPPED | OUTPATIENT
Start: 2023-10-30

## 2023-10-30 RX ORDER — TRAMADOL HYDROCHLORIDE 50 MG/1
50 TABLET ORAL 2 TIMES DAILY PRN
Qty: 60 TABLET | Refills: 2 | Status: SHIPPED | OUTPATIENT
Start: 2023-10-30 | End: 2023-10-31 | Stop reason: SDUPTHER

## 2023-10-30 NOTE — PROGRESS NOTES
"  Chief Complaint  Follow-up (3 month)    Subjective        April Nickie Barnes presents to [facility]  History of Present Illness    Objective   Vital Signs:  /80   Pulse 74   Ht 170.2 cm (67\")   Wt 84.4 kg (186 lb)   SpO2 98%   BMI 29.13 kg/m²   Estimated body mass index is 29.13 kg/m² as calculated from the following:    Height as of this encounter: 170.2 cm (67\").    Weight as of this encounter: 84.4 kg (186 lb).               Physical Exam   Result Review :                   Assessment and Plan   There are no diagnoses linked to this encounter.         Follow Up   No follow-ups on file.  Patient was given instructions and counseling regarding her condition or for health maintenance advice. Please see specific information pulled into the AVS if appropriate.         "

## 2023-10-30 NOTE — PROGRESS NOTES
Subjective   Chief Complaint   Patient presents with    Follow-up     3 month      April Nickie Barnes is a 43 y.o. female.     The patient is here today for a 3-month follow-up. She is following up on chronic low back pain, chronic pelvic pain related to endometriosis, and insomnia. She is having acute vaginitis. An adult male accompanies her.    The patient is doing well. She had her first clean mammogram. She had an MRI because she was having migraines. She was told that she will not need another MRI on her breast until spring. She takes daily vitamins, iron, and vitamin C.    The patient has never had migraines in her life. She had a migraine at the end of 07/2023. She was at work, and it was so bad that she had to leave work early. She had to close one eye. She had another one a couple of weeks later. She took ibuprofen and tramadol for the third time. She notes that since then, she has been doing ithis when she feels like the headache is coming on. She was nauseous. She had to go into the closet at work in the dark.     The patient is having some swelling and pain in the joints of her bilateral hands.     She has discomfort with the lymph node in right axillary where they biopsied this area twice.     The patient does not need the gabapentin. She only takes it when she has sciatic pain which is pretty rare. She has filled it a couple of times. Tramadol greatly helps with her pain in back and pelvic pain r/t endometriosis. She would like to increase the frequency of her dose to what she has taken in the past bf being prescribed gabapentin.      She had 2 periods this month. She saw her gynecologist last year. She knows she is perimenopausal. She went 22 days overdue and then she had 2 periods. She is having the same amount of bleeding and pain. She has always had pain.    The patient had what she thought was a yeast infection. She saw someone else, and they checked her, but it was negative. She started an  over-the-counter vaginal cream, and her symptoms resolved. She took the pill to be sure she did not have it. She has not had one in years.     The patient's blood pressure was slightly elevated today. She waited, had the MRI, and went to Dr. Gar on 10/17/2023.  She had to walk a little bit, but then she sat for a minute. She was afraid she was going to be late, but then she got there, and she was not late. She felt stressed.  She also had drank coffee before she came in this morning. She states that she quit drinking Red Bull.     The patient occasionally cannot see out of her left eye. She had blood pressure issues previously when she was heavier. She still has it occasionally. The patient needs to have an eye examination.    She has not done any colon cancer screening.    I have reviewed the following portions of the patient's history and confirmed they are accurate: allergies, current medications, past family history, past medical history, past social history, past surgical history, and problem list    Review of Systems  Pertinent items are noted in HPI.     Current Outpatient Medications on File Prior to Visit   Medication Sig    Ascorbic Acid (VITAMIN C GUMMIES PO) Take  by mouth.    busPIRone (BUSPAR) 5 MG tablet TAKE ONE TO TWO TABLETS BY MOUTH THREE TIMES A DAY AS NEEDED FOR ANXIETY (Patient taking differently: 1 tablet 2 (Two) Times a Day As Needed. TAKE ONE TO TWO TABLETS BY MOUTH THREE TIMES A DAY AS NEEDED FOR ANXIETY)    cyclobenzaprine (FLEXERIL) 5 MG tablet TAKE ONE TABLET BY MOUTH TWICE A DAY AS NEEDED FOR MUSCLE SPASMS    Diclofenac Sodium (VOLTAREN) 1 % gel gel Apply 1 gram topically to indicated area 4 times daily as needed for mild to moderate pain.    FeroSul 325 (65 Fe) MG tablet TAKE ONE TABLET BY MOUTH DAILY WITH BREAKFAST - TAKE WITH ORANGE JUICE OR VITAMIN C.    gabapentin (NEURONTIN) 300 MG capsule Take 1 capsule by mouth Daily As Needed (as needed for back pain).    ibuprofen  "(ADVIL,MOTRIN) 800 MG tablet TAKE 1 TABLET BY MOUTH EVERY 6 HOURS AS NEEDED FOR MILD PAIN    Multiple Vitamins-Minerals (WOMENS MULTIVITAMIN + COLLAGEN PO) Take  by mouth.    Multivitamin tablet tablet TAKE ONE TABLET BY MOUTH DAILY    ondansetron ODT (ZOFRAN-ODT) 8 MG disintegrating tablet Take 1/2 to 1 tablet by mouth (dissolve under tongue or swallow) every 8 hours as needed for nausea.    traZODone (DESYREL) 100 MG tablet TAKE ONE TO TWO TABLETS BY MOUTH ONCE NIGHTLY ONE HOUR BEFORE BEDTIME AS NEEDED FOR SLEEP    vitamin E 200 UNIT capsule Take 1 capsule by mouth Daily.    [DISCONTINUED] traMADol (ULTRAM) 50 MG tablet Take 1 tablet by mouth Daily As Needed for Moderate Pain (pelvic pain).     No current facility-administered medications on file prior to visit.       Objective   Vitals:    10/30/23 1302 10/30/23 1339   BP: 140/80 132/82   Pulse: 74    SpO2: 98%    Weight: 84.4 kg (186 lb)    Height: 170.2 cm (67\")      Body mass index is 29.13 kg/m².    Physical Exam  Vitals reviewed.   Constitutional:       Appearance: Normal appearance. She is well-developed.   HENT:      Head: Normocephalic and atraumatic.      Nose: Nose normal.   Eyes:      General: Lids are normal.      Conjunctiva/sclera: Conjunctivae normal.      Pupils: Pupils are equal, round, and reactive to light.   Neck:      Thyroid: No thyromegaly.      Trachea: Trachea normal.   Cardiovascular:      Rate and Rhythm: Normal rate and regular rhythm.      Heart sounds: Normal heart sounds.   Pulmonary:      Effort: Pulmonary effort is normal. No respiratory distress.      Breath sounds: Normal breath sounds.   Skin:     General: Skin is warm and dry.   Neurological:      Mental Status: She is alert and oriented to person, place, and time.      GCS: GCS eye subscore is 4. GCS verbal subscore is 5. GCS motor subscore is 6.   Psychiatric:         Attention and Perception: Attention normal.         Mood and Affect: Mood normal.         Speech: Speech " normal.         Behavior: Behavior normal. Behavior is cooperative.         Thought Content: Thought content normal.     Assessment & Plan   Problem List Items Addressed This Visit       Endometriosis    Relevant Medications    traMADol (ULTRAM) 50 MG tablet    Other Relevant Orders    Urine Drug Screen - Urine, Clean Catch    Low back pain     Other Visit Diagnoses       Migraine without aura and without status migrainosus, not intractable    -  Primary    Relevant Medications    traMADol (ULTRAM) 50 MG tablet    rizatriptan (MAXALT) 10 MG tablet    Vitamin D deficiency        Relevant Orders    Vitamin D,25-Hydroxy               Current Outpatient Medications:     Ascorbic Acid (VITAMIN C GUMMIES PO), Take  by mouth., Disp: , Rfl:     busPIRone (BUSPAR) 5 MG tablet, TAKE ONE TO TWO TABLETS BY MOUTH THREE TIMES A DAY AS NEEDED FOR ANXIETY (Patient taking differently: 1 tablet 2 (Two) Times a Day As Needed. TAKE ONE TO TWO TABLETS BY MOUTH THREE TIMES A DAY AS NEEDED FOR ANXIETY), Disp: 90 tablet, Rfl: 2    cyclobenzaprine (FLEXERIL) 5 MG tablet, TAKE ONE TABLET BY MOUTH TWICE A DAY AS NEEDED FOR MUSCLE SPASMS, Disp: 60 tablet, Rfl: 2    Diclofenac Sodium (VOLTAREN) 1 % gel gel, Apply 1 gram topically to indicated area 4 times daily as needed for mild to moderate pain., Disp: 100 g, Rfl: 2    FeroSul 325 (65 Fe) MG tablet, TAKE ONE TABLET BY MOUTH DAILY WITH BREAKFAST - TAKE WITH ORANGE JUICE OR VITAMIN C., Disp: 30 tablet, Rfl: 5    gabapentin (NEURONTIN) 300 MG capsule, Take 1 capsule by mouth Daily As Needed (as needed for back pain)., Disp: 30 capsule, Rfl: 2    ibuprofen (ADVIL,MOTRIN) 800 MG tablet, TAKE 1 TABLET BY MOUTH EVERY 6 HOURS AS NEEDED FOR MILD PAIN, Disp: 60 tablet, Rfl: 5    Multiple Vitamins-Minerals (WOMENS MULTIVITAMIN + COLLAGEN PO), Take  by mouth., Disp: , Rfl:     Multivitamin tablet tablet, TAKE ONE TABLET BY MOUTH DAILY, Disp: 30 tablet, Rfl: 5    ondansetron ODT (ZOFRAN-ODT) 8 MG  disintegrating tablet, Take 1/2 to 1 tablet by mouth (dissolve under tongue or swallow) every 8 hours as needed for nausea., Disp: 30 tablet, Rfl: 1    traMADol (ULTRAM) 50 MG tablet, Take 1 tablet by mouth 2 (Two) Times a Day As Needed for Moderate Pain (pelvic pain)., Disp: 60 tablet, Rfl: 2    traZODone (DESYREL) 100 MG tablet, TAKE ONE TO TWO TABLETS BY MOUTH ONCE NIGHTLY ONE HOUR BEFORE BEDTIME AS NEEDED FOR SLEEP, Disp: 60 tablet, Rfl: 2    vitamin E 200 UNIT capsule, Take 1 capsule by mouth Daily., Disp: , Rfl:     rizatriptan (MAXALT) 10 MG tablet, Take 1 tablet by mouth 1 (One) Time As Needed for Migraine for up to 1 dose. May repeat in 2 hours if needed. Do not exceed 30 mg in 24 hours., Disp: 9 tablet, Rfl: 1       1. Migraine  - New, unstable.  - It could be related to elevated blood pressure or left eye problem.  - Start with Maxalt as needed.  - Continue ibuprofen or Tylenol as needed.  - Patients will schedule appointments with an ophthalmologist for an exam.  - We will continue to monitor a patient's blood pressure and heart rate.  - Patient recently had normal MRI of brain.    2.  Endometriosis  - Chronic, stable.  - Continue tramadol and ibuprofen as needed.  - Encouraged patient to schedule follow-up with gynecologist for irregular menstrual cycles.    3.  Chronic low back pain  - Chronic, stable.  - Continue gabapentin and tramadol.  - Continue Tylenol and ibuprofen as needed.    4. Vitamin D deficiency  - Chronic, unstable.  - Checking vitamin D labs.  - Continue multivitamin.  - Consider starting vitamin D supplement.      Plan of care reviewed with the patient at the conclusion of today's visit.  Education was provided regarding diagnosis, management, and any prescribed or recommended OTC medications.  Patient verbalized understanding of and agreement with management plan.     Return in about 3 months (around 1/30/2024), or if symptoms worsen or fail to improve.    Transcribed from ambient  dictation for [facility] [place] by Kennedi Jones.  10/30/23   14:41 EDT    Patient or patient representative verbalized consent to the visit recording.  I have personally performed the services described in this document as transcribed by the above individual, and it is both accurate and complete.

## 2023-10-31 ENCOUNTER — PRIOR AUTHORIZATION (OUTPATIENT)
Dept: INTERNAL MEDICINE | Facility: CLINIC | Age: 43
End: 2023-10-31
Payer: COMMERCIAL

## 2023-10-31 ENCOUNTER — TELEPHONE (OUTPATIENT)
Dept: INTERNAL MEDICINE | Facility: CLINIC | Age: 43
End: 2023-10-31
Payer: COMMERCIAL

## 2023-10-31 DIAGNOSIS — M51.36 DEGENERATION OF INTERVERTEBRAL DISC OF LUMBAR REGION: ICD-10-CM

## 2023-10-31 DIAGNOSIS — M51.36 DEGENERATION OF INTERVERTEBRAL DISC OF LUMBAR REGION: Primary | ICD-10-CM

## 2023-10-31 DIAGNOSIS — M54.41 CHRONIC BILATERAL LOW BACK PAIN WITH BILATERAL SCIATICA: ICD-10-CM

## 2023-10-31 DIAGNOSIS — G89.29 CHRONIC BILATERAL LOW BACK PAIN WITH BILATERAL SCIATICA: ICD-10-CM

## 2023-10-31 DIAGNOSIS — N80.9 ENDOMETRIOSIS: ICD-10-CM

## 2023-10-31 DIAGNOSIS — M54.42 CHRONIC BILATERAL LOW BACK PAIN WITH BILATERAL SCIATICA: ICD-10-CM

## 2023-10-31 LAB — 25(OH)D3 SERPL-MCNC: 24.8 NG/ML (ref 30–100)

## 2023-10-31 RX ORDER — CYCLOBENZAPRINE HCL 5 MG
5 TABLET ORAL 2 TIMES DAILY PRN
Qty: 60 TABLET | Refills: 2 | Status: SHIPPED | OUTPATIENT
Start: 2023-10-31

## 2023-10-31 NOTE — TELEPHONE ENCOUNTER
Attempted to call patient no answer no vm. Tramadol is 50mg BID, doesn't come in 90mg.   Will send flexeril to pharmacy.

## 2023-10-31 NOTE — TELEPHONE ENCOUNTER
Caller: Cameron, April Melissa    Relationship: Self    Best call back number: [phone]    Requested Prescriptions:   Requested Prescriptions     Pending Prescriptions Disp Refills    cyclobenzaprine (FLEXERIL) 5 MG tablet 60 tablet 2     Sig: Take 1 tablet by mouth 2 (Two) Times a Day As Needed for Muscle Spasms.        Pharmacy where request should be sent: [facility] [id number] [address] & [address] [facility] [phone] [facility] [phone] FX     Last office visit with prescribing clinician: 10/30/2023   Last telemedicine visit with prescribing clinician: Visit date not found   Next office visit with prescribing clinician: Visit date not found     Additional details provided by patient: REFILL FOR TRAMADOL SHOULD BE 90MG PER PT AND THE REFILL FOR FLEXERIL IS NOT AT PHARMACY    Does the patient have less than a 3 day supply:  [x] Yes  [] No    Would you like a call back once the refill request has been completed: [x] Yes [] No    If the office needs to give you a call back, can they leave a voicemail: [] Yes [] No    Luiza Lindsey Rep   10/31/23 13:04 EDT

## 2023-10-31 NOTE — TELEPHONE ENCOUNTER
Caller: Cameron April Melissa    Relationship: Self    Best call back number: [phone]     Requested Prescriptions:   Requested Prescriptions     Pending Prescriptions Disp Refills    traMADol (ULTRAM) 50 MG tablet 60 tablet 2     Sig: Take 1 tablet by mouth 2 (Two) Times a Day As Needed for Moderate Pain (pelvic pain).        Pharmacy where request should be sent: [facility] [id number] [address] [facility] [phone] [facility] [phone] FX     Last office visit with prescribing clinician: 10/30/2023   Last telemedicine visit with prescribing clinician: Visit date not found   Next office visit with prescribing clinician: Visit date not found     Additional details provided by patient: PATIENT CALLED TO CHECK THE STATUS OF THIS PRESCRIPTION. SHE WAS ASKING FOR A QUANTITY OF 90.  THE PREVIOUS MESSAGE SAID SHE WAS ASKING FOR A 90MG. THAT IS INCORRECT!    Luke Bronson, PCT   10/31/23 14:15 EDT

## 2023-11-03 RX ORDER — TRAMADOL HYDROCHLORIDE 50 MG/1
50 TABLET ORAL 2 TIMES DAILY PRN
Qty: 90 TABLET | Refills: 1 | Status: SHIPPED | OUTPATIENT
Start: 2023-11-03

## 2023-11-19 RX ORDER — DIPHENOXYLATE HYDROCHLORIDE AND ATROPINE SULFATE 2.5; .025 MG/1; MG/1
1 TABLET ORAL DAILY
Qty: 30 TABLET | Refills: 11 | Status: SHIPPED | OUTPATIENT
Start: 2023-11-19

## 2023-11-19 RX ORDER — ERGOCALCIFEROL 1.25 MG/1
50000 CAPSULE ORAL WEEKLY
Qty: 4 CAPSULE | Refills: 5 | Status: SHIPPED | OUTPATIENT
Start: 2023-11-19

## 2024-01-30 ENCOUNTER — OFFICE VISIT (OUTPATIENT)
Dept: INTERNAL MEDICINE | Facility: CLINIC | Age: 44
End: 2024-01-30
Payer: COMMERCIAL

## 2024-01-30 VITALS
SYSTOLIC BLOOD PRESSURE: 124 MMHG | WEIGHT: 187 LBS | DIASTOLIC BLOOD PRESSURE: 70 MMHG | HEART RATE: 86 BPM | OXYGEN SATURATION: 98 % | BODY MASS INDEX: 29.35 KG/M2 | HEIGHT: 67 IN

## 2024-01-30 DIAGNOSIS — G89.29 CHRONIC BILATERAL LOW BACK PAIN WITH BILATERAL SCIATICA: ICD-10-CM

## 2024-01-30 DIAGNOSIS — M54.41 CHRONIC BILATERAL LOW BACK PAIN WITH BILATERAL SCIATICA: ICD-10-CM

## 2024-01-30 DIAGNOSIS — N80.9 ENDOMETRIOSIS: ICD-10-CM

## 2024-01-30 DIAGNOSIS — R05.1 ACUTE COUGH: ICD-10-CM

## 2024-01-30 DIAGNOSIS — Z79.899 MEDICATION MANAGEMENT: ICD-10-CM

## 2024-01-30 DIAGNOSIS — J98.8 RESPIRATORY INFECTION: Primary | ICD-10-CM

## 2024-01-30 DIAGNOSIS — M54.42 CHRONIC BILATERAL LOW BACK PAIN WITH BILATERAL SCIATICA: ICD-10-CM

## 2024-01-30 LAB
EXPIRATION DATE: NORMAL
FLUAV AG UPPER RESP QL IA.RAPID: NOT DETECTED
FLUBV AG UPPER RESP QL IA.RAPID: NOT DETECTED
INTERNAL CONTROL: NORMAL
Lab: NORMAL
SARS-COV-2 AG UPPER RESP QL IA.RAPID: NOT DETECTED

## 2024-01-30 PROCEDURE — 1159F MED LIST DOCD IN RCRD: CPT | Performed by: NURSE PRACTITIONER

## 2024-01-30 PROCEDURE — 99214 OFFICE O/P EST MOD 30 MIN: CPT | Performed by: NURSE PRACTITIONER

## 2024-01-30 PROCEDURE — 1160F RVW MEDS BY RX/DR IN RCRD: CPT | Performed by: NURSE PRACTITIONER

## 2024-01-30 PROCEDURE — 87428 SARSCOV & INF VIR A&B AG IA: CPT | Performed by: NURSE PRACTITIONER

## 2024-01-30 RX ORDER — DEXTROMETHORPHAN HYDROBROMIDE AND PROMETHAZINE HYDROCHLORIDE 15; 6.25 MG/5ML; MG/5ML
5 SYRUP ORAL 4 TIMES DAILY PRN
Qty: 240 ML | Refills: 0 | Status: SHIPPED | OUTPATIENT
Start: 2024-01-30

## 2024-01-30 RX ORDER — PREDNISONE 5 MG/1
TABLET ORAL
Qty: 21 TABLET | Refills: 0 | Status: SHIPPED | OUTPATIENT
Start: 2024-01-30

## 2024-01-30 RX ORDER — ALBUTEROL SULFATE 90 UG/1
2 AEROSOL, METERED RESPIRATORY (INHALATION) EVERY 4 HOURS PRN
Qty: 18 G | Refills: 0 | Status: SHIPPED | OUTPATIENT
Start: 2024-01-30

## 2024-01-30 RX ORDER — GABAPENTIN 300 MG/1
300 CAPSULE ORAL 2 TIMES DAILY PRN
Qty: 60 CAPSULE | Refills: 2 | Status: SHIPPED | OUTPATIENT
Start: 2024-01-30

## 2024-01-30 RX ORDER — TRAMADOL HYDROCHLORIDE 50 MG/1
50 TABLET ORAL 3 TIMES DAILY PRN
Qty: 90 TABLET | Refills: 2 | Status: SHIPPED | OUTPATIENT
Start: 2024-01-30

## 2024-02-04 DIAGNOSIS — G47.09 OTHER INSOMNIA: ICD-10-CM

## 2024-02-05 RX ORDER — TRAZODONE HYDROCHLORIDE 100 MG/1
TABLET ORAL
Qty: 60 TABLET | Refills: 2 | OUTPATIENT
Start: 2024-02-05

## 2024-02-07 LAB — DRUGS UR: NORMAL

## 2024-02-26 ENCOUNTER — OFFICE VISIT (OUTPATIENT)
Dept: INTERNAL MEDICINE | Facility: CLINIC | Age: 44
End: 2024-02-26
Payer: COMMERCIAL

## 2024-02-26 VITALS
HEIGHT: 67 IN | HEART RATE: 60 BPM | WEIGHT: 189 LBS | SYSTOLIC BLOOD PRESSURE: 126 MMHG | OXYGEN SATURATION: 98 % | DIASTOLIC BLOOD PRESSURE: 80 MMHG | TEMPERATURE: 97.8 F | RESPIRATION RATE: 20 BRPM | BODY MASS INDEX: 29.66 KG/M2

## 2024-02-26 DIAGNOSIS — M54.50 LUMBAR PAIN: ICD-10-CM

## 2024-02-26 DIAGNOSIS — G43.009 MIGRAINE WITHOUT AURA AND WITHOUT STATUS MIGRAINOSUS, NOT INTRACTABLE: Primary | ICD-10-CM

## 2024-02-26 DIAGNOSIS — M25.551 RIGHT HIP PAIN: ICD-10-CM

## 2024-02-26 DIAGNOSIS — R10.2 PELVIC PAIN: ICD-10-CM

## 2024-02-26 PROCEDURE — 1159F MED LIST DOCD IN RCRD: CPT | Performed by: NURSE PRACTITIONER

## 2024-02-26 PROCEDURE — 99214 OFFICE O/P EST MOD 30 MIN: CPT | Performed by: NURSE PRACTITIONER

## 2024-02-26 PROCEDURE — 1160F RVW MEDS BY RX/DR IN RCRD: CPT | Performed by: NURSE PRACTITIONER

## 2024-02-26 RX ORDER — SUMATRIPTAN 100 MG/1
TABLET, FILM COATED ORAL
Qty: 9 TABLET | Refills: 1 | Status: SHIPPED | OUTPATIENT
Start: 2024-02-26

## 2024-02-26 NOTE — PROGRESS NOTES
Subjective   Chief Complaint   Patient presents with    Pelvic Pain     Right lower quadrant pain x aprox 1 week    Headache    Nausea    Hip Pain      April Nickie Barnes is a 44 y.o. female.     The patient is here today for evaluation of abdominal pain.    The patient, a perimenopausal female with a history of a hip joint lesion identified in 2023, presented with a series of symptoms. On the previous Saturday, 02/17/2024, she experienced hip pain, which was managed with ibuprofen. The pain intensified on Tuesday, 02/20/2024, and by Wednesday, 02/21/2024, she reported severe pelvic pain, distinct from sciatic pain, with no radiation into the leg. Pain management with ibuprofen and tramadol was effective, allowing her to continue work.    She has been amenorrheic since 12/2023. After taking progesterone, she experienced severe nausea for 2 consecutive days. On Thursday, 02/22/2024, evening, she developed a headache that escalated into a migraine lasting until Saturday, 02/24/2024. On Saturday night, she was so sick that she was in the closet at work in the dark. One of her coworkers gave her Imitrex, which eased her headache about an hour later.     During this period, she consumed 10 tramadol tablets, which had no effect on her migraine. An ibuprofen trial also proved ineffective for the migraine. She had a similar migraine episode in 07/2023, which necessitated leaving work. An MRI conducted in 10/2023 was normal.    The patient questioned if her symptoms could be related to perimenopause. She is unable to take estrogen replacements. She reported no current menstrual pain. Her last gynecology consultation was in the spring, post-radiation therapy.    She also reported persistent neck and shoulder tension. She uses a massager multiple times a week, purchased by her partner. Flexeril provided some relief.     I have reviewed the following portions of the patient's history and confirmed they are accurate:  allergies, current medications, past family history, past medical history, past social history, past surgical history, and problem list    Review of Systems  Pertinent items are noted in HPI.     Current Outpatient Medications on File Prior to Visit   Medication Sig    albuterol sulfate  (90 Base) MCG/ACT inhaler Inhale 2 puffs Every 4 (Four) Hours As Needed for Wheezing or Shortness of Air.    Ascorbic Acid (VITAMIN C GUMMIES PO) Take  by mouth.    busPIRone (BUSPAR) 5 MG tablet TAKE ONE TO TWO TABLETS BY MOUTH THREE TIMES A DAY AS NEEDED FOR ANXIETY (Patient taking differently: 1 tablet 2 (Two) Times a Day As Needed. TAKE ONE TO TWO TABLETS BY MOUTH THREE TIMES A DAY AS NEEDED FOR ANXIETY)    cyclobenzaprine (FLEXERIL) 5 MG tablet Take 1 tablet by mouth 2 (Two) Times a Day As Needed for Muscle Spasms.    Diclofenac Sodium (VOLTAREN) 1 % gel gel Apply 1 gram topically to indicated area 4 times daily as needed for mild to moderate pain.    FeroSul 325 (65 Fe) MG tablet TAKE ONE TABLET BY MOUTH DAILY WITH BREAKFAST - TAKE WITH ORANGE JUICE OR VITAMIN C.    gabapentin (NEURONTIN) 300 MG capsule Take 1 capsule by mouth 2 (Two) Times a Day As Needed (back pain).    ibuprofen (ADVIL,MOTRIN) 800 MG tablet TAKE 1 TABLET BY MOUTH EVERY 6 HOURS AS NEEDED FOR MILD PAIN    Multiple Vitamins-Minerals (WOMENS MULTIVITAMIN + COLLAGEN PO) Take  by mouth.    multivitamin (Multiple Vitamin) tablet tablet Take 1 tablet by mouth Daily.    ondansetron ODT (ZOFRAN-ODT) 8 MG disintegrating tablet Take 1/2 to 1 tablet by mouth (dissolve under tongue or swallow) every 8 hours as needed for nausea.    traMADol (ULTRAM) 50 MG tablet Take 1 tablet by mouth 3 (Three) Times a Day As Needed for Moderate Pain.    traZODone (DESYREL) 100 MG tablet TAKE ONE TO TWO TABLETS BY MOUTH ONCE NIGHTLY ONE HOUR BEFORE BEDTIME AS NEEDED FOR SLEEP    vitamin E 200 UNIT capsule Take 1 capsule by mouth Daily.     No current facility-administered  "medications on file prior to visit.       Objective   Vitals:    02/26/24 1639   BP: 126/80   BP Location: Left arm   Patient Position: Sitting   Cuff Size: Adult   Pulse: 60   Resp: 20   Temp: 97.8 °F (36.6 °C)   TempSrc: Tympanic   SpO2: 98%   Weight: 85.7 kg (189 lb)   Height: 170.2 cm (67\")     Body mass index is 29.6 kg/m².    Physical Exam  Vitals reviewed.   Constitutional:       Appearance: Normal appearance. She is well-developed.   HENT:      Head: Normocephalic and atraumatic.      Nose: Nose normal.   Eyes:      General: Lids are normal.      Conjunctiva/sclera: Conjunctivae normal.      Pupils: Pupils are equal, round, and reactive to light.   Neck:      Thyroid: No thyromegaly.      Trachea: Trachea normal.   Cardiovascular:      Rate and Rhythm: Normal rate and regular rhythm.      Heart sounds: Normal heart sounds.   Pulmonary:      Effort: Pulmonary effort is normal. No respiratory distress.      Breath sounds: Normal breath sounds.   Musculoskeletal:      Lumbar back: Tenderness present.      Right hip: Tenderness present.   Skin:     General: Skin is warm and dry.   Neurological:      Mental Status: She is alert and oriented to person, place, and time.      GCS: GCS eye subscore is 4. GCS verbal subscore is 5. GCS motor subscore is 6.   Psychiatric:         Attention and Perception: Attention normal.         Mood and Affect: Mood normal.         Speech: Speech normal.         Behavior: Behavior normal. Behavior is cooperative.         Thought Content: Thought content normal.         Bone scan from 2016 was reviewed with the patient.    Assessment & Plan   Problem List Items Addressed This Visit    None  Visit Diagnoses       Migraine without aura and without status migrainosus, not intractable    -  Primary    Relevant Medications    SUMAtriptan (Imitrex) 100 MG tablet    Right hip pain        Relevant Orders    XR Hip With or Without Pelvis 2 - 3 View Right    Lumbar pain        Relevant Orders "    XR Spine Lumbar Complete 4+VW    Pelvic pain            1. Migraine.  New, unstable.  Start Imitrex as needed.   The patient will increase fluid intake including electrolyte-based drinks such as sugar-free Propel and Gatorade.    2. Right hip pain and lumbar pain.  Chronic, unstable.   X-rays ordered.   Continue ibuprofen, tramadol, and Tylenol as needed for pain.   Continue Flexeril as needed.    3. Pelvic pain.  Chronic, unstable.   Continue ibuprofen, tramadol, Tylenol, and Flexeril as needed for pain.   Due to the patient's history of endometriosis, discussed possibly following up with gynecology and consider pelvic ultrasound if hip and lumbar x-rays are unremarkable.       Current Outpatient Medications:     albuterol sulfate  (90 Base) MCG/ACT inhaler, Inhale 2 puffs Every 4 (Four) Hours As Needed for Wheezing or Shortness of Air., Disp: 18 g, Rfl: 0    Ascorbic Acid (VITAMIN C GUMMIES PO), Take  by mouth., Disp: , Rfl:     busPIRone (BUSPAR) 5 MG tablet, TAKE ONE TO TWO TABLETS BY MOUTH THREE TIMES A DAY AS NEEDED FOR ANXIETY (Patient taking differently: 1 tablet 2 (Two) Times a Day As Needed. TAKE ONE TO TWO TABLETS BY MOUTH THREE TIMES A DAY AS NEEDED FOR ANXIETY), Disp: 90 tablet, Rfl: 2    cyclobenzaprine (FLEXERIL) 5 MG tablet, Take 1 tablet by mouth 2 (Two) Times a Day As Needed for Muscle Spasms., Disp: 60 tablet, Rfl: 2    Diclofenac Sodium (VOLTAREN) 1 % gel gel, Apply 1 gram topically to indicated area 4 times daily as needed for mild to moderate pain., Disp: 100 g, Rfl: 2    FeroSul 325 (65 Fe) MG tablet, TAKE ONE TABLET BY MOUTH DAILY WITH BREAKFAST - TAKE WITH ORANGE JUICE OR VITAMIN C., Disp: 30 tablet, Rfl: 5    gabapentin (NEURONTIN) 300 MG capsule, Take 1 capsule by mouth 2 (Two) Times a Day As Needed (back pain)., Disp: 60 capsule, Rfl: 2    ibuprofen (ADVIL,MOTRIN) 800 MG tablet, TAKE 1 TABLET BY MOUTH EVERY 6 HOURS AS NEEDED FOR MILD PAIN, Disp: 60 tablet, Rfl: 5    Multiple  Vitamins-Minerals (WOMENS MULTIVITAMIN + COLLAGEN PO), Take  by mouth., Disp: , Rfl:     multivitamin (Multiple Vitamin) tablet tablet, Take 1 tablet by mouth Daily., Disp: 30 tablet, Rfl: 11    ondansetron ODT (ZOFRAN-ODT) 8 MG disintegrating tablet, Take 1/2 to 1 tablet by mouth (dissolve under tongue or swallow) every 8 hours as needed for nausea., Disp: 30 tablet, Rfl: 1    traMADol (ULTRAM) 50 MG tablet, Take 1 tablet by mouth 3 (Three) Times a Day As Needed for Moderate Pain., Disp: 90 tablet, Rfl: 2    traZODone (DESYREL) 100 MG tablet, TAKE ONE TO TWO TABLETS BY MOUTH ONCE NIGHTLY ONE HOUR BEFORE BEDTIME AS NEEDED FOR SLEEP, Disp: 60 tablet, Rfl: 2    vitamin E 200 UNIT capsule, Take 1 capsule by mouth Daily., Disp: , Rfl:     SUMAtriptan (Imitrex) 100 MG tablet, Take one tablet at onset of headache. May repeat dose one time in 2 hours if headache not relieved., Disp: 9 tablet, Rfl: 1       Plan of care reviewed with the patient at the conclusion of today's visit.  Education was provided regarding diagnosis, management, and any prescribed or recommended OTC medications.  Patient verbalized understanding of and agreement with management plan.     Return in 3 months (on 5/26/2024), or if symptoms worsen or fail to improve, for Follow-up.      Transcribed from ambient dictation for [facility] [place] by Ld Cantrell 02/26/24 17:08 EST    Patient or patient representative verbalized consent to the visit recording.  I have personally performed the services described in this document as transcribed by the above individual, and it is both accurate and complete.

## 2024-05-14 DIAGNOSIS — G89.29 CHRONIC BILATERAL LOW BACK PAIN WITH BILATERAL SCIATICA: ICD-10-CM

## 2024-05-14 DIAGNOSIS — N80.9 ENDOMETRIOSIS: ICD-10-CM

## 2024-05-14 DIAGNOSIS — M54.41 CHRONIC BILATERAL LOW BACK PAIN WITH BILATERAL SCIATICA: ICD-10-CM

## 2024-05-14 DIAGNOSIS — M54.42 CHRONIC BILATERAL LOW BACK PAIN WITH BILATERAL SCIATICA: ICD-10-CM

## 2024-05-17 RX ORDER — GABAPENTIN 300 MG/1
CAPSULE ORAL
Qty: 60 CAPSULE | Refills: 0 | Status: SHIPPED | OUTPATIENT
Start: 2024-05-17

## 2024-05-17 RX ORDER — TRAMADOL HYDROCHLORIDE 50 MG/1
50 TABLET ORAL 3 TIMES DAILY PRN
Qty: 90 TABLET | Refills: 0 | Status: SHIPPED | OUTPATIENT
Start: 2024-05-17

## 2024-05-20 ENCOUNTER — HOSPITAL ENCOUNTER (OUTPATIENT)
Dept: MAMMOGRAPHY | Facility: HOSPITAL | Age: 44
Discharge: HOME | End: 2024-05-20
Admitting: RADIOLOGY
Payer: COMMERCIAL

## 2024-05-20 DIAGNOSIS — R92.8 ABNORMAL MAMMOGRAM: ICD-10-CM

## 2024-05-20 PROCEDURE — 77065 DX MAMMO INCL CAD UNI: CPT | Performed by: RADIOLOGY

## 2024-05-20 PROCEDURE — G0279 TOMOSYNTHESIS, MAMMO: HCPCS

## 2024-05-20 PROCEDURE — 77061 BREAST TOMOSYNTHESIS UNI: CPT | Performed by: RADIOLOGY

## 2024-05-20 PROCEDURE — 77065 DX MAMMO INCL CAD UNI: CPT

## 2024-07-02 ENCOUNTER — OFFICE VISIT (OUTPATIENT)
Dept: INTERNAL MEDICINE | Facility: CLINIC | Age: 44
End: 2024-07-02
Payer: COMMERCIAL

## 2024-07-02 VITALS
HEIGHT: 67 IN | SYSTOLIC BLOOD PRESSURE: 124 MMHG | DIASTOLIC BLOOD PRESSURE: 78 MMHG | OXYGEN SATURATION: 97 % | TEMPERATURE: 98.3 F | HEART RATE: 70 BPM | RESPIRATION RATE: 22 BRPM | BODY MASS INDEX: 30.64 KG/M2 | WEIGHT: 195.2 LBS

## 2024-07-02 DIAGNOSIS — F41.9 ANXIETY: ICD-10-CM

## 2024-07-02 DIAGNOSIS — M54.2 CERVICAL PAIN: ICD-10-CM

## 2024-07-02 DIAGNOSIS — Z13.220 LIPID SCREENING: ICD-10-CM

## 2024-07-02 DIAGNOSIS — Z13.0 SCREENING FOR BLOOD DISEASE: ICD-10-CM

## 2024-07-02 DIAGNOSIS — G47.09 OTHER INSOMNIA: ICD-10-CM

## 2024-07-02 DIAGNOSIS — M54.41 CHRONIC BILATERAL LOW BACK PAIN WITH BILATERAL SCIATICA: Primary | ICD-10-CM

## 2024-07-02 DIAGNOSIS — Z13.1 SCREENING FOR DIABETES MELLITUS: ICD-10-CM

## 2024-07-02 DIAGNOSIS — G89.29 CHRONIC BILATERAL LOW BACK PAIN WITH BILATERAL SCIATICA: Primary | ICD-10-CM

## 2024-07-02 DIAGNOSIS — Z13.21 ENCOUNTER FOR VITAMIN DEFICIENCY SCREENING: ICD-10-CM

## 2024-07-02 DIAGNOSIS — N80.9 ENDOMETRIOSIS: ICD-10-CM

## 2024-07-02 DIAGNOSIS — Z12.11 SCREENING FOR COLON CANCER: ICD-10-CM

## 2024-07-02 DIAGNOSIS — E55.9 VITAMIN D DEFICIENCY: ICD-10-CM

## 2024-07-02 DIAGNOSIS — Z13.29 THYROID DISORDER SCREENING: ICD-10-CM

## 2024-07-02 DIAGNOSIS — M54.42 CHRONIC BILATERAL LOW BACK PAIN WITH BILATERAL SCIATICA: Primary | ICD-10-CM

## 2024-07-02 DIAGNOSIS — Z79.899 MEDICATION MANAGEMENT: ICD-10-CM

## 2024-07-02 PROCEDURE — 99214 OFFICE O/P EST MOD 30 MIN: CPT | Performed by: NURSE PRACTITIONER

## 2024-07-02 PROCEDURE — 1126F AMNT PAIN NOTED NONE PRSNT: CPT | Performed by: NURSE PRACTITIONER

## 2024-07-02 PROCEDURE — 1159F MED LIST DOCD IN RCRD: CPT | Performed by: NURSE PRACTITIONER

## 2024-07-02 PROCEDURE — 1160F RVW MEDS BY RX/DR IN RCRD: CPT | Performed by: NURSE PRACTITIONER

## 2024-07-02 RX ORDER — TRAMADOL HYDROCHLORIDE 50 MG/1
50 TABLET ORAL 3 TIMES DAILY PRN
Qty: 90 TABLET | Refills: 2 | Status: SHIPPED | OUTPATIENT
Start: 2024-07-02

## 2024-07-02 RX ORDER — CYCLOBENZAPRINE HCL 5 MG
5 TABLET ORAL 2 TIMES DAILY PRN
Qty: 60 TABLET | Refills: 2 | Status: SHIPPED | OUTPATIENT
Start: 2024-07-02

## 2024-07-02 RX ORDER — IBUPROFEN 800 MG/1
800 TABLET ORAL EVERY 6 HOURS PRN
Qty: 60 TABLET | Refills: 5 | Status: SHIPPED | OUTPATIENT
Start: 2024-07-02

## 2024-07-02 RX ORDER — TRAZODONE HYDROCHLORIDE 100 MG/1
TABLET ORAL
Qty: 60 TABLET | Refills: 2 | Status: SHIPPED | OUTPATIENT
Start: 2024-07-02

## 2024-07-02 RX ORDER — GABAPENTIN 300 MG/1
300 CAPSULE ORAL 3 TIMES DAILY PRN
Qty: 60 CAPSULE | Refills: 2 | Status: SHIPPED | OUTPATIENT
Start: 2024-07-02

## 2024-07-02 NOTE — PROGRESS NOTES
Subjective   Chief Complaint   Patient presents with    Follow-up      April Nickie Barnes is a 44 y.o. female.     History of Present Illness  The patient presents for evaluation of multiple medical concerns.    The patient reports experiencing right-sided sciatic pain today, which she attributes to increased physical activity during her recent vacation. She also experiences significant pain in her shoulders and neck, which she believes is muscular in nature and stress-induced. She has been self-medicating with ibuprofen 800 mg, which she takes during her menstrual cycle. Previously, she was prescribed Klonopin for sciatic pain, but this has since been discontinued. To manage her sciatic pain, she takes ibuprofen, tramadol, and gabapentin before bedtime, which typically alleviates her pain upon waking. Gabapentin is effective in managing her sciatic pain, but she refrains from taking it frequently due to its sedative effects.    Supplemental Information  She had a nodule last year and her oncologist sent her for an MRI. She had an MRI of her head and neck. She has a lot of pain in her neck. She thinks it is stress. When she was on vacation, it stopped hurting, but as soon as she got back to [place], she got a migraine and her neck started hurting again. She uses Flexeril for muscle pain. She is taking iron pill, multivitamin, ibuprofen, tramadol, gabapentin, Flexeril, and trazodone regularly. She takes buspirone occasionally. She had a dental implant. She was missing a tooth. They went in and put the piece that goes down into the bone. Later on, they go back and put the tooth on. The piece they put down into her bone. It was a very serious abscess. They ended up having to go in twice at the emergency room and clean it out. It was one of the worst infections she has ever had in her life. She took clindamycin for over a month. They refilled her clindamycin 2 times. She was given morphine, but it did not do  "anything. They finally gave her Toradol. It was the only thing that gave her relief. She had a clean mammogram in 04/2023.    I have reviewed the following portions of the patient's history and confirmed they are accurate: allergies, current medications, past family history, past medical history, past social history, past surgical history, and problem list    Review of Systems  Pertinent items are noted in HPI.     Current Outpatient Medications on File Prior to Visit   Medication Sig    albuterol sulfate  (90 Base) MCG/ACT inhaler Inhale 2 puffs Every 4 (Four) Hours As Needed for Wheezing or Shortness of Air.    Ascorbic Acid (VITAMIN C GUMMIES PO) Take  by mouth.    busPIRone (BUSPAR) 5 MG tablet TAKE ONE TO TWO TABLETS BY MOUTH THREE TIMES A DAY AS NEEDED FOR ANXIETY (Patient taking differently: 1 tablet 2 (Two) Times a Day As Needed. TAKE ONE TO TWO TABLETS BY MOUTH THREE TIMES A DAY AS NEEDED FOR ANXIETY)    Multiple Vitamins-Minerals (WOMENS MULTIVITAMIN + COLLAGEN PO) Take  by mouth.    multivitamin (Multiple Vitamin) tablet tablet Take 1 tablet by mouth Daily.    ondansetron ODT (ZOFRAN-ODT) 8 MG disintegrating tablet Take 1/2 to 1 tablet by mouth (dissolve under tongue or swallow) every 8 hours as needed for nausea.    SUMAtriptan (Imitrex) 100 MG tablet Take one tablet at onset of headache. May repeat dose one time in 2 hours if headache not relieved.    vitamin E 200 UNIT capsule Take 1 capsule by mouth Daily.    Diclofenac Sodium (VOLTAREN) 1 % gel gel Apply 1 gram topically to indicated area 4 times daily as needed for mild to moderate pain. (Patient not taking: Reported on 7/2/2024)     No current facility-administered medications on file prior to visit.       Objective   Vitals:    07/02/24 1550   BP: 124/78   Pulse: 70   Resp: 22   Temp: 98.3 °F (36.8 °C)   SpO2: 97%   Weight: 88.5 kg (195 lb 3.2 oz)   Height: 170.2 cm (67.01\")   PainSc: 0-No pain     Body mass index is 30.57 " kg/m².    Physical Exam  Vitals reviewed.   Constitutional:       Appearance: Normal appearance. She is well-developed.   HENT:      Head: Normocephalic and atraumatic.      Nose: Nose normal.   Eyes:      General: Lids are normal.      Conjunctiva/sclera: Conjunctivae normal.      Pupils: Pupils are equal, round, and reactive to light.   Neck:      Thyroid: No thyromegaly.      Trachea: Trachea normal.   Cardiovascular:      Rate and Rhythm: Normal rate and regular rhythm.      Heart sounds: Normal heart sounds.   Pulmonary:      Effort: Pulmonary effort is normal. No respiratory distress.      Breath sounds: Normal breath sounds.   Musculoskeletal:      Cervical back: Tenderness present.      Lumbar back: Tenderness present.   Skin:     General: Skin is warm and dry.   Neurological:      Mental Status: She is alert and oriented to person, place, and time.      GCS: GCS eye subscore is 4. GCS verbal subscore is 5. GCS motor subscore is 6.   Psychiatric:         Attention and Perception: Attention normal.         Mood and Affect: Mood normal.         Speech: Speech normal.         Behavior: Behavior normal. Behavior is cooperative.         Thought Content: Thought content normal.         Results      Assessment & Plan   Problem List Items Addressed This Visit       Endometriosis    Relevant Medications    ibuprofen (ADVIL,MOTRIN) 800 MG tablet    traMADol (ULTRAM) 50 MG tablet    cyclobenzaprine (FLEXERIL) 5 MG tablet    Other Relevant Orders    Compliance Drug Analysis, Ur - Urine, Clean Catch (Completed)    Insomnia    Relevant Medications    traZODone (DESYREL) 100 MG tablet    Low back pain - Primary    Relevant Medications    ibuprofen (ADVIL,MOTRIN) 800 MG tablet    traMADol (ULTRAM) 50 MG tablet    gabapentin (NEURONTIN) 300 MG capsule    cyclobenzaprine (FLEXERIL) 5 MG tablet    Other Relevant Orders    Compliance Drug Analysis, Ur - Urine, Clean Catch (Completed)     Other Visit Diagnoses       Cervical  pain        Relevant Medications    traMADol (ULTRAM) 50 MG tablet    gabapentin (NEURONTIN) 300 MG capsule    cyclobenzaprine (FLEXERIL) 5 MG tablet    Anxiety        Medication management        Relevant Orders    Compliance Drug Analysis, Ur - Urine, Clean Catch (Completed)    Screening for blood disease        Relevant Orders    CBC (No Diff)    Comprehensive Metabolic Panel    Lipid Panel    Hemoglobin A1c    TSH Rfx On Abnormal To Free T4    Vitamin B12 & Folate    Vitamin D,25-Hydroxy    Thyroid disorder screening        Relevant Orders    TSH Rfx On Abnormal To Free T4    Lipid screening        Relevant Orders    Lipid Panel    Encounter for vitamin deficiency screening        Relevant Orders    Vitamin B12 & Folate    Vitamin D,25-Hydroxy    Screening for diabetes mellitus        Relevant Orders    Hemoglobin A1c    Vitamin D deficiency        Relevant Orders    Vitamin D,25-Hydroxy    Screening for colon cancer        Relevant Orders    Cologuard - Stool, Per Rectum               Current Outpatient Medications:     albuterol sulfate  (90 Base) MCG/ACT inhaler, Inhale 2 puffs Every 4 (Four) Hours As Needed for Wheezing or Shortness of Air., Disp: 18 g, Rfl: 0    Ascorbic Acid (VITAMIN C GUMMIES PO), Take  by mouth., Disp: , Rfl:     busPIRone (BUSPAR) 5 MG tablet, TAKE ONE TO TWO TABLETS BY MOUTH THREE TIMES A DAY AS NEEDED FOR ANXIETY (Patient taking differently: 1 tablet 2 (Two) Times a Day As Needed. TAKE ONE TO TWO TABLETS BY MOUTH THREE TIMES A DAY AS NEEDED FOR ANXIETY), Disp: 90 tablet, Rfl: 2    cyclobenzaprine (FLEXERIL) 5 MG tablet, Take 1 tablet by mouth 2 (Two) Times a Day As Needed for Muscle Spasms., Disp: 60 tablet, Rfl: 2    gabapentin (NEURONTIN) 300 MG capsule, Take 1 capsule by mouth 3 (Three) Times a Day As Needed (low back and neck pain)., Disp: 60 capsule, Rfl: 2    ibuprofen (ADVIL,MOTRIN) 800 MG tablet, Take 1 tablet by mouth Every 6 (Six) Hours As Needed for Mild Pain., Disp:  60 tablet, Rfl: 5    Multiple Vitamins-Minerals (WOMENS MULTIVITAMIN + COLLAGEN PO), Take  by mouth., Disp: , Rfl:     multivitamin (Multiple Vitamin) tablet tablet, Take 1 tablet by mouth Daily., Disp: 30 tablet, Rfl: 11    ondansetron ODT (ZOFRAN-ODT) 8 MG disintegrating tablet, Take 1/2 to 1 tablet by mouth (dissolve under tongue or swallow) every 8 hours as needed for nausea., Disp: 30 tablet, Rfl: 1    SUMAtriptan (Imitrex) 100 MG tablet, Take one tablet at onset of headache. May repeat dose one time in 2 hours if headache not relieved., Disp: 9 tablet, Rfl: 1    traMADol (ULTRAM) 50 MG tablet, Take 1 tablet by mouth 3 (Three) Times a Day As Needed for Moderate Pain or Severe Pain. moderate pain, Disp: 90 tablet, Rfl: 2    traZODone (DESYREL) 100 MG tablet, TAKE ONE TO TWO TABLETS BY MOUTH ONCE NIGHTLY ONE HOUR BEFORE BEDTIME AS NEEDED FOR SLEEP, Disp: 60 tablet, Rfl: 2    vitamin E 200 UNIT capsule, Take 1 capsule by mouth Daily., Disp: , Rfl:     Diclofenac Sodium (VOLTAREN) 1 % gel gel, Apply 1 gram topically to indicated area 4 times daily as needed for mild to moderate pain. (Patient not taking: Reported on 7/2/2024), Disp: 100 g, Rfl: 2    FeroSul 325 (65 Fe) MG tablet, TAKE 1 TABLET BY MOUTH DAILY WITH BREAKFAST WITH ORANGE JUICE OR VITAMIN C., Disp: 30 tablet, Rfl: 5    Assessment & Plan  1. Chronic bilateral low back pain and cervical pain.  The condition is chronic and stable. The patient is advised to resume ibuprofen, while maintaining the current regimen of tramadol, gabapentin, and Flexeril as required. A drug screen was conducted today, and the JACQUELIN was reviewed and found to be appropriate. Should the patient's symptoms persist, a prednisone pack will be considered, and updated imaging will be obtained.    2. Insomnia.  The condition is a chronic and stable condition. The patient is advised to continue with the trazodone regimen.    3. Anxiety.  The anxiety is a chronic and stable condition.  Continue buspar.     4. Endometriosis.  The condition is chronic and stable. The patient is to continue with the tramadol and ibuprofen as required. Continuation of follow-ups with the gynecologist is advised.         Plan of care reviewed with the patient at the conclusion of today's visit.  Education was provided regarding diagnosis, management, and any prescribed or recommended OTC medications.  Patient verbalized understanding of and agreement with management plan.     Return in about 3 months (around 10/2/2024), or if symptoms worsen or fail to improve.      Transcribed from ambient dictation for MACY Birmingham by MACY Birmingham.  07/02/24   16:23 EDT    Patient or patient representative verbalized consent for the use of Ambient Listening during the visit with  MACY Birmingham for chart documentation. 7/21/2024  20:17 EDT

## 2024-07-12 LAB — DRUGS UR: NORMAL

## 2024-07-14 DIAGNOSIS — D50.8 IRON DEFICIENCY ANEMIA SECONDARY TO INADEQUATE DIETARY IRON INTAKE: ICD-10-CM

## 2024-07-15 RX ORDER — FERROUS SULFATE 325(65) MG
TABLET ORAL
Qty: 30 TABLET | Refills: 5 | Status: SHIPPED | OUTPATIENT
Start: 2024-07-15

## 2024-10-06 DIAGNOSIS — G47.09 OTHER INSOMNIA: ICD-10-CM

## 2024-10-07 ENCOUNTER — OFFICE VISIT (OUTPATIENT)
Dept: INTERNAL MEDICINE | Facility: CLINIC | Age: 44
End: 2024-10-07
Payer: COMMERCIAL

## 2024-10-07 VITALS
HEIGHT: 67 IN | BODY MASS INDEX: 31.45 KG/M2 | WEIGHT: 200.4 LBS | SYSTOLIC BLOOD PRESSURE: 126 MMHG | OXYGEN SATURATION: 97 % | TEMPERATURE: 98.3 F | HEART RATE: 75 BPM | DIASTOLIC BLOOD PRESSURE: 84 MMHG

## 2024-10-07 DIAGNOSIS — F41.9 ANXIETY: Primary | ICD-10-CM

## 2024-10-07 DIAGNOSIS — G43.009 MIGRAINE WITHOUT AURA AND WITHOUT STATUS MIGRAINOSUS, NOT INTRACTABLE: ICD-10-CM

## 2024-10-07 DIAGNOSIS — Z79.899 MEDICATION MANAGEMENT: ICD-10-CM

## 2024-10-07 DIAGNOSIS — N80.9 ENDOMETRIOSIS: ICD-10-CM

## 2024-10-07 DIAGNOSIS — Z12.4 CERVICAL CANCER SCREENING: ICD-10-CM

## 2024-10-07 DIAGNOSIS — M54.41 CHRONIC BILATERAL LOW BACK PAIN WITH BILATERAL SCIATICA: ICD-10-CM

## 2024-10-07 DIAGNOSIS — M54.2 CERVICAL PAIN: ICD-10-CM

## 2024-10-07 DIAGNOSIS — F51.01 PRIMARY INSOMNIA: ICD-10-CM

## 2024-10-07 DIAGNOSIS — G89.29 CHRONIC BILATERAL LOW BACK PAIN WITH BILATERAL SCIATICA: ICD-10-CM

## 2024-10-07 DIAGNOSIS — M54.42 CHRONIC BILATERAL LOW BACK PAIN WITH BILATERAL SCIATICA: ICD-10-CM

## 2024-10-07 PROCEDURE — 99214 OFFICE O/P EST MOD 30 MIN: CPT | Performed by: NURSE PRACTITIONER

## 2024-10-07 PROCEDURE — 1126F AMNT PAIN NOTED NONE PRSNT: CPT | Performed by: NURSE PRACTITIONER

## 2024-10-07 PROCEDURE — 1159F MED LIST DOCD IN RCRD: CPT | Performed by: NURSE PRACTITIONER

## 2024-10-07 PROCEDURE — 1160F RVW MEDS BY RX/DR IN RCRD: CPT | Performed by: NURSE PRACTITIONER

## 2024-10-07 RX ORDER — TRAMADOL HYDROCHLORIDE 50 MG/1
50 TABLET ORAL 3 TIMES DAILY PRN
Qty: 90 TABLET | Refills: 2 | Status: SHIPPED | OUTPATIENT
Start: 2024-10-07

## 2024-10-07 RX ORDER — TRAZODONE HYDROCHLORIDE 100 MG/1
TABLET ORAL
Qty: 60 TABLET | Refills: 3 | Status: SHIPPED | OUTPATIENT
Start: 2024-10-07

## 2024-10-07 RX ORDER — GABAPENTIN 300 MG/1
300 CAPSULE ORAL 3 TIMES DAILY PRN
Qty: 60 CAPSULE | Refills: 2 | Status: SHIPPED | OUTPATIENT
Start: 2024-10-07

## 2024-10-07 NOTE — PROGRESS NOTES
Subjective   Chief Complaint   Patient presents with    Med Refill        April Nickie Barnes is a 44 y.o. female.    History of Present Illness  The patient presents for evaluation of multiple medical concerns.    Anxiety has been well mananaged with buspar. Trazodone is working well for sleep.     She is currently experiencing her menstrual cycle, which has been irregular since December 2023. She missed her period from December 2023 to February 2024 and then experienced two cycles within a two-week span. However, her cycles have been regular for the past few months. She also reports experiencing hot flashes. She has hx of endometriosis. She has not seen gynecology in some time.     She has hx of breast cancer. She has been attempting to schedule an MRI for her breast but was informed that a bilateral mammogram is required first. She had a unilateral mammogram in the spring of 2024. She has undergone a Cologuard test and a Pap smear at [facility], both of which were negative.    She has been taking iron supplements for a long time due to her vegetarian diet but has recently started consuming meat again. She has stopped taking the iron supplements after reading about a potential link between iron and tumor growth. Her current medications include multivitamins, vitamin E, vitamin C, gabapentin, tramadol, trazodone, Flexeril, and ibuprofen.    She has been experiencing neck and shoulder pain for over a year, which occasionally leads to migraines. She uses Imitrex for her migraines, but only as needed. The pain is so severe that she sometimes cannot turn her head. She has an extra vertebra, which was identified by a chiropractor through an x-ray. 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 She has tried various pillows to alleviate the pain and currently uses a medium firm pillow. She also uses a physical therapist-grade massage gun, which provides some relief.    FAMILY  HISTORY  She has no family history of breast cancer.    I have reviewed the following portions of the patient's history and confirmed they are accurate: allergies, current medications, past family history, past medical history, past social history, past surgical history, and problem list    Review of Systems  Pertinent items are noted in HPI.     Current Outpatient Medications on File Prior to Visit   Medication Sig    albuterol sulfate  (90 Base) MCG/ACT inhaler Inhale 2 puffs Every 4 (Four) Hours As Needed for Wheezing or Shortness of Air.    Ascorbic Acid (VITAMIN C GUMMIES PO) Take  by mouth.    busPIRone (BUSPAR) 5 MG tablet TAKE ONE TO TWO TABLETS BY MOUTH THREE TIMES A DAY AS NEEDED FOR ANXIETY (Patient taking differently: 1 tablet 2 (Two) Times a Day As Needed. TAKE ONE TO TWO TABLETS BY MOUTH THREE TIMES A DAY AS NEEDED FOR ANXIETY)    cyclobenzaprine (FLEXERIL) 5 MG tablet Take 1 tablet by mouth 2 (Two) Times a Day As Needed for Muscle Spasms.    Diclofenac Sodium (VOLTAREN) 1 % gel gel Apply 1 gram topically to indicated area 4 times daily as needed for mild to moderate pain. (Patient not taking: Reported on 7/2/2024)    FeroSul 325 (65 Fe) MG tablet TAKE 1 TABLET BY MOUTH DAILY WITH BREAKFAST WITH ORANGE JUICE OR VITAMIN C.    ibuprofen (ADVIL,MOTRIN) 800 MG tablet Take 1 tablet by mouth Every 6 (Six) Hours As Needed for Mild Pain.    Multiple Vitamins-Minerals (WOMENS MULTIVITAMIN + COLLAGEN PO) Take  by mouth.    multivitamin (Multiple Vitamin) tablet tablet Take 1 tablet by mouth Daily.    ondansetron ODT (ZOFRAN-ODT) 8 MG disintegrating tablet Take 1/2 to 1 tablet by mouth (dissolve under tongue or swallow) every 8 hours as needed for nausea.    SUMAtriptan (Imitrex) 100 MG tablet Take one tablet at onset of headache. May repeat dose one time in 2 hours if headache not relieved.    traZODone (DESYREL) 100 MG tablet TAKE ONE TO TWO TABLETS BY MOUTH NIGHTLY ONE HOUR BEFORE BEDTIME AS NEEDED FOR  "SLEEP    vitamin E 200 UNIT capsule Take 1 capsule by mouth Daily.     No current facility-administered medications on file prior to visit.       Objective   Vitals:    10/07/24 1607   BP: 126/84   BP Location: Left arm   Patient Position: Sitting   Cuff Size: Adult   Pulse: 75   Temp: 98.3 °F (36.8 °C)   SpO2: 97%   Weight: 90.9 kg (200 lb 6.4 oz)   Height: 170.2 cm (67.01\")     Body mass index is 31.38 kg/m².    Physical Exam  Vitals reviewed.   Constitutional:       Appearance: Normal appearance. She is well-developed.   HENT:      Head: Normocephalic and atraumatic.      Nose: Nose normal.   Eyes:      General: Lids are normal.      Conjunctiva/sclera: Conjunctivae normal.      Pupils: Pupils are equal, round, and reactive to light.   Neck:      Thyroid: No thyromegaly.      Trachea: Trachea normal.   Cardiovascular:      Rate and Rhythm: Normal rate and regular rhythm.      Heart sounds: Normal heart sounds.   Pulmonary:      Effort: Pulmonary effort is normal. No respiratory distress.      Breath sounds: Normal breath sounds.   Musculoskeletal:      Cervical back: Tenderness present. Decreased range of motion.      Lumbar back: Tenderness present.   Skin:     General: Skin is warm and dry.   Neurological:      Mental Status: She is alert and oriented to person, place, and time.      GCS: GCS eye subscore is 4. GCS verbal subscore is 5. GCS motor subscore is 6.   Psychiatric:         Attention and Perception: Attention normal.         Mood and Affect: Mood normal.         Speech: Speech normal.         Behavior: Behavior normal. Behavior is cooperative.         Thought Content: Thought content normal. Thought content does not include homicidal or suicidal ideation. Thought content does not include homicidal or suicidal plan.         Results  Laboratory Studies  Cologuard test was negative.    Assessment & Plan   Problem List Items Addressed This Visit       Endometriosis    Relevant Medications    traMADol " (ULTRAM) 50 MG tablet    Insomnia    Low back pain    Relevant Medications    traMADol (ULTRAM) 50 MG tablet    gabapentin (NEURONTIN) 300 MG capsule     Other Visit Diagnoses       Anxiety    -  Primary    Migraine without aura and without status migrainosus, not intractable        Relevant Medications    traMADol (ULTRAM) 50 MG tablet    gabapentin (NEURONTIN) 300 MG capsule    Cervical pain        Relevant Medications    traMADol (ULTRAM) 50 MG tablet    gabapentin (NEURONTIN) 300 MG capsule    Other Relevant Orders    XR Spine Cervical Complete 4 or 5 View    Cervical cancer screening        Relevant Orders    Ambulatory Referral to Obstetrics / Gynecology (Completed)    Medication management        Relevant Orders    Compliance Drug Analysis, Ur - Urine, Clean Catch               Current Outpatient Medications:     gabapentin (NEURONTIN) 300 MG capsule, Take 1 capsule by mouth 3 (Three) Times a Day As Needed (low back and neck pain)., Disp: 60 capsule, Rfl: 2    traMADol (ULTRAM) 50 MG tablet, Take 1 tablet by mouth 3 (Three) Times a Day As Needed for Moderate Pain or Severe Pain. moderate pain, Disp: 90 tablet, Rfl: 2    albuterol sulfate  (90 Base) MCG/ACT inhaler, Inhale 2 puffs Every 4 (Four) Hours As Needed for Wheezing or Shortness of Air., Disp: 18 g, Rfl: 0    Ascorbic Acid (VITAMIN C GUMMIES PO), Take  by mouth., Disp: , Rfl:     busPIRone (BUSPAR) 5 MG tablet, TAKE ONE TO TWO TABLETS BY MOUTH THREE TIMES A DAY AS NEEDED FOR ANXIETY (Patient taking differently: 1 tablet 2 (Two) Times a Day As Needed. TAKE ONE TO TWO TABLETS BY MOUTH THREE TIMES A DAY AS NEEDED FOR ANXIETY), Disp: 90 tablet, Rfl: 2    cyclobenzaprine (FLEXERIL) 5 MG tablet, Take 1 tablet by mouth 2 (Two) Times a Day As Needed for Muscle Spasms., Disp: 60 tablet, Rfl: 2    Diclofenac Sodium (VOLTAREN) 1 % gel gel, Apply 1 gram topically to indicated area 4 times daily as needed for mild to moderate pain. (Patient not taking:  Reported on 7/2/2024), Disp: 100 g, Rfl: 2    FeroSul 325 (65 Fe) MG tablet, TAKE 1 TABLET BY MOUTH DAILY WITH BREAKFAST WITH ORANGE JUICE OR VITAMIN C., Disp: 30 tablet, Rfl: 5    ibuprofen (ADVIL,MOTRIN) 800 MG tablet, Take 1 tablet by mouth Every 6 (Six) Hours As Needed for Mild Pain., Disp: 60 tablet, Rfl: 5    Multiple Vitamins-Minerals (WOMENS MULTIVITAMIN + COLLAGEN PO), Take  by mouth., Disp: , Rfl:     multivitamin (Multiple Vitamin) tablet tablet, Take 1 tablet by mouth Daily., Disp: 30 tablet, Rfl: 11    ondansetron ODT (ZOFRAN-ODT) 8 MG disintegrating tablet, Take 1/2 to 1 tablet by mouth (dissolve under tongue or swallow) every 8 hours as needed for nausea., Disp: 30 tablet, Rfl: 1    SUMAtriptan (Imitrex) 100 MG tablet, Take one tablet at onset of headache. May repeat dose one time in 2 hours if headache not relieved., Disp: 9 tablet, Rfl: 1    traZODone (DESYREL) 100 MG tablet, TAKE ONE TO TWO TABLETS BY MOUTH NIGHTLY ONE HOUR BEFORE BEDTIME AS NEEDED FOR SLEEP, Disp: 60 tablet, Rfl: 3    vitamin E 200 UNIT capsule, Take 1 capsule by mouth Daily., Disp: , Rfl:     Assessment & Plan  Continue imitrex for migraines. Continue buspar for anxiety and trazodone for sleep. Continue tramadol and IBU for endometriosis and pelvic pain. Referring to gynecology for consult. Continue tramadol, gabapentin, flexeril, and IBU for neck and back pain. If she has worsening of condition will order updated xrays. Patient educated on risks and side effects of taking tramadol and gabapentin including risk of addiction and sedation. Advised to use this medication sparingly.  Advised to not drive or operate heavy machinery when taking this medication. UDS and CSC completed. Filippo reviewed and appropriate.     Will complete fasting labs at follow up in 3 months.              Plan of care reviewed with the patient at the conclusion of today's visit.  Education was provided regarding diagnosis, management, and any prescribed or  recommended OTC medications.  Patient verbalized understanding of and agreement with management plan.     Return in about 3 months (around 1/7/2025), or if symptoms worsen or fail to improve.      Transcribed from ambient dictation for MACY Birmingham by MACY Birmingham.  10/07/24   16:27 EDT    Patient or patient representative verbalized consent for the use of Ambient Listening during the visit with  MACY Birmingham for chart documentation. 10/13/2024  15:40 EDT

## 2024-10-15 ENCOUNTER — HOSPITAL ENCOUNTER (OUTPATIENT)
Facility: HOSPITAL | Age: 44
Discharge: HOME | End: 2024-10-15
Payer: COMMERCIAL

## 2024-10-15 ENCOUNTER — LAB (OUTPATIENT)
Facility: HOSPITAL | Age: 44
End: 2024-10-15
Payer: COMMERCIAL

## 2024-10-15 DIAGNOSIS — Z13.1 SCREENING FOR DIABETES MELLITUS: ICD-10-CM

## 2024-10-15 DIAGNOSIS — M25.551 RIGHT HIP PAIN: ICD-10-CM

## 2024-10-15 DIAGNOSIS — E55.9 VITAMIN D DEFICIENCY: ICD-10-CM

## 2024-10-15 DIAGNOSIS — Z13.21 ENCOUNTER FOR VITAMIN DEFICIENCY SCREENING: ICD-10-CM

## 2024-10-15 DIAGNOSIS — M54.2 CERVICAL PAIN: ICD-10-CM

## 2024-10-15 DIAGNOSIS — Z13.0 SCREENING FOR BLOOD DISEASE: ICD-10-CM

## 2024-10-15 DIAGNOSIS — Z13.220 LIPID SCREENING: ICD-10-CM

## 2024-10-15 DIAGNOSIS — Z13.29 THYROID DISORDER SCREENING: ICD-10-CM

## 2024-10-15 LAB
ALBUMIN SERPL-MCNC: 4.5 G/DL (ref 3.5–5.2)
ALBUMIN/GLOB SERPL: 2 G/DL
ALP SERPL-CCNC: 66 U/L (ref 39–117)
ALT SERPL W P-5'-P-CCNC: 19 U/L (ref 1–33)
ANION GAP SERPL CALCULATED.3IONS-SCNC: 6.9 MMOL/L (ref 5–15)
AST SERPL-CCNC: 22 U/L (ref 1–32)
BILIRUB SERPL-MCNC: 0.2 MG/DL (ref 0–1.2)
BUN SERPL-MCNC: 19 MG/DL (ref 6–20)
BUN/CREAT SERPL: 27.9 (ref 7–25)
CALCIUM SPEC-SCNC: 9.3 MG/DL (ref 8.6–10.5)
CHLORIDE SERPL-SCNC: 105 MMOL/L (ref 98–107)
CHOLEST SERPL-MCNC: 188 MG/DL (ref 0–200)
CO2 SERPL-SCNC: 26.1 MMOL/L (ref 22–29)
CREAT SERPL-MCNC: 0.68 MG/DL (ref 0.57–1)
DEPRECATED RDW RBC AUTO: 41.1 FL (ref 37–54)
EGFRCR SERPLBLD CKD-EPI 2021: 108.9 ML/MIN/1.73
ERYTHROCYTE [DISTWIDTH] IN BLOOD BY AUTOMATED COUNT: 11.5 % (ref 12.3–15.4)
GLOBULIN UR ELPH-MCNC: 2.2 GM/DL
GLUCOSE SERPL-MCNC: 85 MG/DL (ref 65–99)
HCT VFR BLD AUTO: 42.5 % (ref 34–46.6)
HDLC SERPL-MCNC: 63 MG/DL (ref 40–60)
HGB BLD-MCNC: 13.7 G/DL (ref 12–15.9)
LDLC SERPL CALC-MCNC: 102 MG/DL (ref 0–100)
LDLC/HDLC SERPL: 1.57 {RATIO}
MCH RBC QN AUTO: 31.4 PG (ref 26.6–33)
MCHC RBC AUTO-ENTMCNC: 32.2 G/DL (ref 31.5–35.7)
MCV RBC AUTO: 97.3 FL (ref 79–97)
PLATELET # BLD AUTO: 177 10*3/MM3 (ref 140–450)
PMV BLD AUTO: 12.3 FL (ref 6–12)
POTASSIUM SERPL-SCNC: 4 MMOL/L (ref 3.5–5.2)
PROT SERPL-MCNC: 6.7 G/DL (ref 6–8.5)
RBC # BLD AUTO: 4.37 10*6/MM3 (ref 3.77–5.28)
SODIUM SERPL-SCNC: 138 MMOL/L (ref 136–145)
TRIGL SERPL-MCNC: 130 MG/DL (ref 0–150)
VLDLC SERPL-MCNC: 23 MG/DL (ref 5–40)
WBC NRBC COR # BLD AUTO: 4.42 10*3/MM3 (ref 3.4–10.8)

## 2024-10-15 PROCEDURE — 73502 X-RAY EXAM HIP UNI 2-3 VIEWS: CPT

## 2024-10-15 PROCEDURE — 82306 VITAMIN D 25 HYDROXY: CPT

## 2024-10-15 PROCEDURE — 80061 LIPID PANEL: CPT

## 2024-10-15 PROCEDURE — 80050 GENERAL HEALTH PANEL: CPT

## 2024-10-15 PROCEDURE — 83036 HEMOGLOBIN GLYCOSYLATED A1C: CPT

## 2024-10-15 PROCEDURE — 82746 ASSAY OF FOLIC ACID SERUM: CPT

## 2024-10-15 PROCEDURE — 72050 X-RAY EXAM NECK SPINE 4/5VWS: CPT

## 2024-10-15 PROCEDURE — 82607 VITAMIN B-12: CPT

## 2024-10-16 LAB
25(OH)D3 SERPL-MCNC: 16.4 NG/ML (ref 30–100)
FOLATE SERPL-MCNC: 5.64 NG/ML (ref 4.78–24.2)
HBA1C MFR BLD: 5 % (ref 4.8–5.6)
TSH SERPL DL<=0.05 MIU/L-ACNC: 3.76 UIU/ML (ref 0.27–4.2)
VIT B12 BLD-MCNC: 503 PG/ML (ref 211–946)

## 2024-10-17 ENCOUNTER — TELEPHONE (OUTPATIENT)
Dept: INTERNAL MEDICINE | Facility: CLINIC | Age: 44
End: 2024-10-17
Payer: COMMERCIAL

## 2024-10-17 LAB — DRUGS UR: NORMAL

## 2024-10-17 NOTE — TELEPHONE ENCOUNTER
Caller: Alena Barnes    Relationship: Self    Best call back number:   Telephone Information:   Mobile [phone]     What is the best time to reach you: PT WORKS THIRD SHIFT AND PHONE WILL BE OFF UNTIL 1300 TOMORROW    Who are you requesting to speak with (clinical staff, provider,  specific staff member): PROVIDER    What was the call regardin. PATIENT HAD LABS DONE ON 10/15/2024 AND BASED ON WHAT SHE SAW IN THE RESULTS, DOES SHE NEED TO START ON A B-12 SUPPLEMENT?    2. WHEN SHE WAS IN THE OFFICE [facility] RODNEY LARKIN RECOMMENDED A PILLOW FOR NECK PAIN. OUR PATIENT HAS NOT BEEN ABLE TO FIND IT. IF SHE COULD GIVE HER THE EXACT NAME OF THE PILLOW SO THAT SHE COULD ORDER ONE    3. DOES RODNEY LARKIN HAVE ANY OTHER SUGGESTIONS, PLAN OF CARE BASED ON THE RESULTS OF HER X-RAYS? DOES SHE NEED TO START PHYSICAL THERAPY?

## 2024-10-18 DIAGNOSIS — G89.29 CHRONIC BILATERAL LOW BACK PAIN WITH BILATERAL SCIATICA: ICD-10-CM

## 2024-10-18 DIAGNOSIS — M54.2 CERVICAL PAIN: Primary | ICD-10-CM

## 2024-10-18 DIAGNOSIS — M25.551 RIGHT HIP PAIN: ICD-10-CM

## 2024-10-18 DIAGNOSIS — M54.41 CHRONIC BILATERAL LOW BACK PAIN WITH BILATERAL SCIATICA: ICD-10-CM

## 2024-10-18 DIAGNOSIS — M25.551 RIGHT HIP PAIN: Primary | ICD-10-CM

## 2024-10-18 DIAGNOSIS — M54.42 CHRONIC BILATERAL LOW BACK PAIN WITH BILATERAL SCIATICA: ICD-10-CM

## 2024-10-18 RX ORDER — DIPHENOXYLATE HYDROCHLORIDE AND ATROPINE SULFATE 2.5; .025 MG/1; MG/1
1 TABLET ORAL DAILY
Qty: 30 TABLET | Refills: 11 | Status: SHIPPED | OUTPATIENT
Start: 2024-10-18

## 2024-10-18 RX ORDER — ERGOCALCIFEROL 1.25 MG/1
50000 CAPSULE, LIQUID FILLED ORAL WEEKLY
Qty: 4 CAPSULE | Refills: 5 | Status: SHIPPED | OUTPATIENT
Start: 2024-10-18

## 2024-10-22 ENCOUNTER — TELEPHONE (OUTPATIENT)
Dept: INTERNAL MEDICINE | Facility: CLINIC | Age: 44
End: 2024-10-22
Payer: COMMERCIAL

## 2024-10-22 NOTE — TELEPHONE ENCOUNTER
Caller: Alena Barnes    Relationship: Self    Best call back number: [phone]     What is the medical concern/diagnosis: MILD DISK DIEASE IN THE NECK , LUMP AT THE BASE OF THE NECK AT THE TOP OF THE SPINE     What specialty or service is being requested: MRI    What is the provider, practice or medical service name: [facility]     What is the office location: [facility]    What is the office phone number: [phone]    Any additional details: PLEASE CALL PATIENT ONCE THE REFERRAL REQUEST HAS BEEN SEND AND APPROVED.

## 2024-10-27 NOTE — TELEPHONE ENCOUNTER
Insurance will not approve MRI until she completes 6 weeks of PT. I placed order for PT on 10/18. Is she wanting to pay for MRI out of pocket at [place]?

## 2024-10-29 ENCOUNTER — HOSPITAL ENCOUNTER (OUTPATIENT)
Facility: HOSPITAL | Age: 44
Discharge: HOME | End: 2024-10-29
Admitting: RADIOLOGY
Payer: COMMERCIAL

## 2024-10-29 DIAGNOSIS — R92.8 ABNORMAL MAMMOGRAM: ICD-10-CM

## 2024-10-29 PROCEDURE — 77066 DX MAMMO INCL CAD BI: CPT

## 2024-10-29 PROCEDURE — G0279 TOMOSYNTHESIS, MAMMO: HCPCS

## 2024-10-30 ENCOUNTER — TELEPHONE (OUTPATIENT)
Dept: INTERNAL MEDICINE | Facility: CLINIC | Age: 44
End: 2024-10-30
Payer: COMMERCIAL

## 2024-10-30 NOTE — TELEPHONE ENCOUNTER
[facility] called as they scheduled the patient for an MRI of the cervical spine. They need the office notes faxed over for it. Fax number is [phone]

## 2024-11-07 ENCOUNTER — TELEPHONE (OUTPATIENT)
Dept: INTERNAL MEDICINE | Facility: CLINIC | Age: 44
End: 2024-11-07
Payer: COMMERCIAL

## 2024-11-07 NOTE — TELEPHONE ENCOUNTER
Caller: Alena Barnes    Relationship: Self    Best call back number: [phone]     What is the best time to reach you: ANYTIME    Who are you requesting to speak with (clinical staff, provider,  specific staff member): PROVIDER    Do you know the name of the person who called: NA    What was the call regarding: PATIENT STARTED HAVING A FEELING LIKE THERE IS A LUMP IN HER THROAT. PATIENT IS WANTING TO SEE IF THE MRI THAT IS SCHEDULE WILL ALSO SHOW HER THROAT AS WELL. PLEASE CALL TO DISCUSS.     Is it okay if the provider responds through MyChart: NO

## 2024-11-07 NOTE — TELEPHONE ENCOUNTER
[facility] called as the patient's mri scheduled for Monday was denied. Appeal info is phone # [phone], case # [id number]. They need to know by tomorrow if it can be approved or not. Call back number is [phone]

## 2024-11-08 NOTE — TELEPHONE ENCOUNTER
THE PATIENT CALLED AGAIN ABOUT THIS MESSAGE.  PLEASE CALL BACK IF POSSIBLE BEFORE THE END OF THE DAY.  [phone]

## 2024-11-08 NOTE — TELEPHONE ENCOUNTER
Called [facility] back to inform  them the pt plans to pay out of pocket, but they spoke to the pt earlier and resolved the issue, ashlyn

## 2024-11-08 NOTE — TELEPHONE ENCOUNTER
Janis - patient plans to pay out of pocket. No pa needed. Can you get it scheduled at [facility] for her

## 2024-11-11 NOTE — TELEPHONE ENCOUNTER
Contact patient and advise the cervical MRI will only look at the cervical spine and not her throat. CT scan or ultrasound of neck only looks at soft tissue, lymph nodes, and thyroid. To evaluate lump in throat she would need to see GI for scope. If symptoms continue ask her to follow up with me so we can develop a plan.

## 2024-11-12 ENCOUNTER — OFFICE VISIT (OUTPATIENT)
Dept: INTERNAL MEDICINE | Facility: CLINIC | Age: 44
End: 2024-11-12
Payer: COMMERCIAL

## 2024-11-12 VITALS
TEMPERATURE: 97.2 F | DIASTOLIC BLOOD PRESSURE: 64 MMHG | BODY MASS INDEX: 31.08 KG/M2 | WEIGHT: 198 LBS | HEART RATE: 77 BPM | SYSTOLIC BLOOD PRESSURE: 138 MMHG | OXYGEN SATURATION: 99 % | HEIGHT: 67 IN

## 2024-11-12 DIAGNOSIS — R39.9 UTI SYMPTOMS: Primary | ICD-10-CM

## 2024-11-12 DIAGNOSIS — R22.1 NECK SWELLING: ICD-10-CM

## 2024-11-12 DIAGNOSIS — R30.0 DYSURIA: ICD-10-CM

## 2024-11-12 DIAGNOSIS — Z80.8 FAMILY HISTORY OF THYROID CANCER: ICD-10-CM

## 2024-11-12 PROCEDURE — 1160F RVW MEDS BY RX/DR IN RCRD: CPT | Performed by: NURSE PRACTITIONER

## 2024-11-12 PROCEDURE — 99214 OFFICE O/P EST MOD 30 MIN: CPT | Performed by: NURSE PRACTITIONER

## 2024-11-12 PROCEDURE — 1159F MED LIST DOCD IN RCRD: CPT | Performed by: NURSE PRACTITIONER

## 2024-11-12 PROCEDURE — 1126F AMNT PAIN NOTED NONE PRSNT: CPT | Performed by: NURSE PRACTITIONER

## 2024-11-12 RX ORDER — NITROFURANTOIN 25; 75 MG/1; MG/1
100 CAPSULE ORAL 2 TIMES DAILY
Qty: 10 CAPSULE | Refills: 0 | Status: SHIPPED | OUTPATIENT
Start: 2024-11-12 | End: 2024-11-17

## 2024-11-12 NOTE — PROGRESS NOTES
"Chief Complaint   Patient presents with    Urinary Tract Infection         April Nickie Barnes is a 44 y.o. female presents dysuria and urgency.  This started yesterday.  She denies fever, abdominal pain, or flank pain.  Her last UTI was about 15 years ago and she states that it felt just like this.  She also complains of difficulty swallowing at times and she feels like her neck swells.  She states that she has a history of cancer and her cousin just got diagnosed with thyroid cancer.    The following portions of the patient's history were reviewed and updated as appropriate: allergies, current medications, past family history, past medical history, past social history, past surgical history, and problem list.    Subjective  Review of Systems   Constitutional:  Negative for activity change, appetite change, fatigue and fever.   HENT:  Positive for trouble swallowing. Negative for congestion.    Respiratory:  Negative for cough and shortness of breath.    Cardiovascular:  Negative for chest pain and leg swelling.   Gastrointestinal:  Negative for abdominal pain and nausea.   Genitourinary:  Positive for dysuria and urgency. Negative for flank pain.   Neurological:  Negative for dizziness, weakness and confusion.   Psychiatric/Behavioral:  Negative for behavioral problems and decreased concentration.        Objective  Visit Vitals  /64 (BP Location: Left arm, Patient Position: Sitting)   Pulse 77   Temp 97.2 °F (36.2 °C)   Ht 170.2 cm (67\")   Wt 89.8 kg (198 lb)   LMP 10/03/2024   SpO2 99%   BMI 31.01 kg/m²        Physical Exam  Vitals and nursing note reviewed.   HENT:      Head: Normocephalic.   Eyes:      Pupils: Pupils are equal, round, and reactive to light.   Neck:      Comments: Possible thyroid enlargement  Pulmonary:      Effort: Pulmonary effort is normal. No respiratory distress.   Lymphadenopathy:      Cervical: No cervical adenopathy.   Skin:     General: Skin is warm and dry.      Capillary " Refill: Capillary refill takes less than 2 seconds.   Neurological:      General: No focal deficit present.      Mental Status: She is alert and oriented to person, place, and time.      Gait: Gait is intact.   Psychiatric:         Attention and Perception: Attention normal.         Mood and Affect: Mood normal.         Behavior: Behavior normal.          Procedures     Assessment and Plan  Diagnoses and all orders for this visit:    1. UTI symptoms (Primary)  -     nitrofurantoin, macrocrystal-monohydrate, (Macrobid) 100 MG capsule; Take 1 capsule by mouth 2 (Two) Times a Day for 5 days.  Dispense: 10 capsule; Refill: 0    2. Dysuria    3. Neck swelling  -     US Head Neck Soft Tissue; Future    4. Family history of thyroid cancer    No UA done because pt is using AZO  Start Macrobid  Increase water intake  Order U/S of neck    Return if symptoms worsen or fail to improve.      Brando Schmidt, APRN

## 2024-11-13 ENCOUNTER — TELEPHONE (OUTPATIENT)
Dept: INTERNAL MEDICINE | Facility: CLINIC | Age: 44
End: 2024-11-13
Payer: COMMERCIAL

## 2024-11-13 DIAGNOSIS — M54.2 CERVICAL PAIN: ICD-10-CM

## 2024-11-13 NOTE — TELEPHONE ENCOUNTER
Caller: Alena Barnes    Relationship: Self    Best call back number: [phone]    Caller requesting test results: SELF    What test was performed: MRI    When was the test performed: 11/11/2024    Where was the test performed: [facility]    Additional notes: PATIENT CALLED CHECKING THE STATUS OF TEST RESULTS. PLEASE ADVISE AND CALL PATIENT BACK

## 2024-11-14 ENCOUNTER — TELEPHONE (OUTPATIENT)
Dept: INTERNAL MEDICINE | Facility: CLINIC | Age: 44
End: 2024-11-14

## 2024-11-14 DIAGNOSIS — M50.20 CERVICAL DISC HERNIATION: Primary | ICD-10-CM

## 2024-11-14 DIAGNOSIS — M54.2 CERVICAL PAIN: ICD-10-CM

## 2024-11-14 NOTE — TELEPHONE ENCOUNTER
Caller: Cameron, April Nickie    Relationship: Self    Best call back number: [phone]     What is the best time to reach you: ANYTIME    Who are you requesting to speak with (clinical staff, provider,  specific staff member): PCP/MA    Do you know the name of the person who called: APRIL    What was the call regarding: PATIENT STATED PER awe.smHART MESSAGE FROM PCP SHE WOULD BE INTERESTED IN PAIN INJECTIONS IN HER NECK, SHE WOULD ALSO LIKE TO BE REFERRED TO SPECIALIST REGARDING HER NECK ISSUES . SHE IS ALSO HAVING A US ON HER THYROID NEXT WEEK AND WOULD LIKE LABS SUBMITTED SO SHE CAN GET THOSE WHEN SHE GOES TO HAVE THE ULTRASOUND.PATIENT ALSO WANTS A CALLBACK TO DISCUSS A ISSUE SHE IS HAVING. SHE STATED IT IS NOT CONSTANT BUT SHE HAS TIMES WHERE THE PAIN IN HER NECK IS SO BAD AND HER THYROID IS SWELLING SO BAD THAT SHE FEELS LIKE SHE IS BEING STRANGLED. SHE IS WANTING TO SEE IF THERE IS ANYTHING THAT CAN BE PRESCRIBED FOR THE SWELLING.    Is it okay if the provider responds through MyChart: CALLBACK

## 2024-11-15 DIAGNOSIS — E04.9 ENLARGED THYROID: Primary | ICD-10-CM

## 2024-11-18 ENCOUNTER — TELEPHONE (OUTPATIENT)
Dept: ONCOLOGY | Facility: CLINIC | Age: 44
End: 2024-11-18

## 2024-11-18 NOTE — TELEPHONE ENCOUNTER
Caller: Alena Barnes    Relationship to patient: Self    Best call back number: [phone] PLEASE CALL AFTER NOON OR RETURN VIA [facility]    Chief complaint: PATIENT     Type of visit: FU1    Requested date: PATIENT NEXT AVAILABLE AFTERNOON PREFERRED    LAST SEEN 10/17/23  PATIENT HAVING SOME CONCERNS WITH THYROID AND NEEDS TO HAVE HX OF BREAST CANCER CHECKED

## 2024-11-19 ENCOUNTER — HOSPITAL ENCOUNTER (OUTPATIENT)
Dept: ULTRASOUND IMAGING | Facility: HOSPITAL | Age: 44
Discharge: HOME | End: 2024-11-19
Payer: COMMERCIAL

## 2024-11-19 ENCOUNTER — LAB (OUTPATIENT)
Dept: LAB | Facility: HOSPITAL | Age: 44
End: 2024-11-19
Payer: COMMERCIAL

## 2024-11-19 DIAGNOSIS — R22.1 NECK SWELLING: ICD-10-CM

## 2024-11-19 DIAGNOSIS — E04.9 ENLARGED THYROID: ICD-10-CM

## 2024-11-19 LAB — TSH SERPL DL<=0.05 MIU/L-ACNC: 1.88 UIU/ML (ref 0.27–4.2)

## 2024-11-19 PROCEDURE — 84443 ASSAY THYROID STIM HORMONE: CPT

## 2024-11-19 PROCEDURE — 76536 US EXAM OF HEAD AND NECK: CPT

## 2024-11-20 ENCOUNTER — TELEPHONE (OUTPATIENT)
Dept: INTERNAL MEDICINE | Facility: CLINIC | Age: 44
End: 2024-11-20
Payer: COMMERCIAL

## 2024-11-20 DIAGNOSIS — M50.20 CERVICAL DISC HERNIATION: Primary | ICD-10-CM

## 2024-11-20 DIAGNOSIS — M54.2 CERVICAL PAIN: ICD-10-CM

## 2024-11-20 NOTE — TELEPHONE ENCOUNTER
Caller: CameronAlena    Relationship: Self    Best call back number: [phone]     What is the medical concern/diagnosis: HERNIATED DISK IN NECK    What specialty or service is being requested: NEUROSURGEON    What is the provider, practice or medical service name: SOMEONE IN [facility] IF POSSIBLE      Any additional details: PATIENT WANTS TO GO TO NEUROSURGEON BEFORE TRYING PAIN MANAGEMENT

## 2024-11-22 ENCOUNTER — TELEPHONE (OUTPATIENT)
Dept: INTERNAL MEDICINE | Facility: CLINIC | Age: 44
End: 2024-11-22

## 2024-11-22 NOTE — TELEPHONE ENCOUNTER
Caller: Alena Barnes    Relationship: Self    Best call back number: [phone]     What medication are you requesting: ANTIBIOTIC FOR UTI    If a prescription is needed, what is your preferred pharmacy and phone number: [facility] [phone] - [address] [facility] [phone] [facility] [phone]      Additional notes: PATIENT WAS GIVEN ONE FOR HER UTI. SHE FINISHED IT BUT IS STILL HAVING SYMPTOMS. SHE WOULD LIKE A DIFFERENT ANTIBIOTIC.

## 2024-11-25 DIAGNOSIS — R30.0 DYSURIA: Primary | ICD-10-CM

## 2024-11-26 ENCOUNTER — LAB (OUTPATIENT)
Dept: LAB | Facility: HOSPITAL | Age: 44
End: 2024-11-26
Payer: COMMERCIAL

## 2024-11-26 ENCOUNTER — OFFICE VISIT (OUTPATIENT)
Dept: ONCOLOGY | Facility: CLINIC | Age: 44
End: 2024-11-26
Payer: COMMERCIAL

## 2024-11-26 VITALS
WEIGHT: 201.8 LBS | HEART RATE: 53 BPM | HEIGHT: 67 IN | OXYGEN SATURATION: 97 % | BODY MASS INDEX: 31.67 KG/M2 | DIASTOLIC BLOOD PRESSURE: 81 MMHG | TEMPERATURE: 96.9 F | RESPIRATION RATE: 16 BRPM | SYSTOLIC BLOOD PRESSURE: 122 MMHG

## 2024-11-26 DIAGNOSIS — C50.411 MALIGNANT NEOPLASM OF UPPER-OUTER QUADRANT OF RIGHT BREAST IN FEMALE, ESTROGEN RECEPTOR POSITIVE: ICD-10-CM

## 2024-11-26 DIAGNOSIS — Z17.0 MALIGNANT NEOPLASM OF UPPER-OUTER QUADRANT OF RIGHT BREAST IN FEMALE, ESTROGEN RECEPTOR POSITIVE: ICD-10-CM

## 2024-11-26 DIAGNOSIS — C50.411 MALIGNANT NEOPLASM OF UPPER-OUTER QUADRANT OF RIGHT BREAST IN FEMALE, ESTROGEN RECEPTOR POSITIVE: Primary | ICD-10-CM

## 2024-11-26 DIAGNOSIS — Z17.0 MALIGNANT NEOPLASM OF UPPER-OUTER QUADRANT OF RIGHT BREAST IN FEMALE, ESTROGEN RECEPTOR POSITIVE: Primary | ICD-10-CM

## 2024-11-26 LAB
ALBUMIN SERPL-MCNC: 4.1 G/DL (ref 3.5–5.2)
ALBUMIN/GLOB SERPL: 1.7 G/DL
ALP SERPL-CCNC: 78 U/L (ref 39–117)
ALT SERPL W P-5'-P-CCNC: 24 U/L (ref 1–33)
ANION GAP SERPL CALCULATED.3IONS-SCNC: 7 MMOL/L (ref 5–15)
AST SERPL-CCNC: 27 U/L (ref 1–32)
BASOPHILS # BLD AUTO: 0.04 10*3/MM3 (ref 0–0.2)
BASOPHILS NFR BLD AUTO: 0.8 % (ref 0–1.5)
BILIRUB SERPL-MCNC: 0.4 MG/DL (ref 0–1.2)
BUN SERPL-MCNC: 14 MG/DL (ref 6–20)
BUN/CREAT SERPL: 20.6 (ref 7–25)
CALCIUM SPEC-SCNC: 8.8 MG/DL (ref 8.6–10.5)
CHLORIDE SERPL-SCNC: 107 MMOL/L (ref 98–107)
CO2 SERPL-SCNC: 26 MMOL/L (ref 22–29)
CREAT SERPL-MCNC: 0.68 MG/DL (ref 0.57–1)
DEPRECATED RDW RBC AUTO: 42.5 FL (ref 37–54)
EGFRCR SERPLBLD CKD-EPI 2021: 108.9 ML/MIN/1.73
EOSINOPHIL # BLD AUTO: 0.05 10*3/MM3 (ref 0–0.4)
EOSINOPHIL NFR BLD AUTO: 1.1 % (ref 0.3–6.2)
ERYTHROCYTE [DISTWIDTH] IN BLOOD BY AUTOMATED COUNT: 12 % (ref 12.3–15.4)
GLOBULIN UR ELPH-MCNC: 2.4 GM/DL
GLUCOSE SERPL-MCNC: 87 MG/DL (ref 65–99)
HCT VFR BLD AUTO: 38.6 % (ref 34–46.6)
HGB BLD-MCNC: 12.8 G/DL (ref 12–15.9)
IMM GRANULOCYTES # BLD AUTO: 0.01 10*3/MM3 (ref 0–0.05)
IMM GRANULOCYTES NFR BLD AUTO: 0.2 % (ref 0–0.5)
LDH SERPL-CCNC: 166 U/L (ref 135–214)
LYMPHOCYTES # BLD AUTO: 1.19 10*3/MM3 (ref 0.7–3.1)
LYMPHOCYTES NFR BLD AUTO: 25.2 % (ref 19.6–45.3)
MCH RBC QN AUTO: 31.6 PG (ref 26.6–33)
MCHC RBC AUTO-ENTMCNC: 33.2 G/DL (ref 31.5–35.7)
MCV RBC AUTO: 95.3 FL (ref 79–97)
MONOCYTES # BLD AUTO: 0.32 10*3/MM3 (ref 0.1–0.9)
MONOCYTES NFR BLD AUTO: 6.8 % (ref 5–12)
NEUTROPHILS NFR BLD AUTO: 3.11 10*3/MM3 (ref 1.7–7)
NEUTROPHILS NFR BLD AUTO: 65.9 % (ref 42.7–76)
PLATELET # BLD AUTO: 178 10*3/MM3 (ref 140–450)
PMV BLD AUTO: 11 FL (ref 6–12)
POTASSIUM SERPL-SCNC: 4.2 MMOL/L (ref 3.5–5.2)
PROT SERPL-MCNC: 6.5 G/DL (ref 6–8.5)
RBC # BLD AUTO: 4.05 10*6/MM3 (ref 3.77–5.28)
SODIUM SERPL-SCNC: 140 MMOL/L (ref 136–145)
T4 FREE SERPL-MCNC: 0.84 NG/DL (ref 0.92–1.68)
WBC NRBC COR # BLD AUTO: 4.72 10*3/MM3 (ref 3.4–10.8)

## 2024-11-26 PROCEDURE — 84439 ASSAY OF FREE THYROXINE: CPT

## 2024-11-26 PROCEDURE — 83615 LACTATE (LD) (LDH) ENZYME: CPT

## 2024-11-26 PROCEDURE — 85025 COMPLETE CBC W/AUTO DIFF WBC: CPT

## 2024-11-26 PROCEDURE — 36415 COLL VENOUS BLD VENIPUNCTURE: CPT

## 2024-11-26 PROCEDURE — 86376 MICROSOMAL ANTIBODY EACH: CPT

## 2024-11-26 PROCEDURE — 80053 COMPREHEN METABOLIC PANEL: CPT

## 2024-11-26 PROCEDURE — 1125F AMNT PAIN NOTED PAIN PRSNT: CPT | Performed by: INTERNAL MEDICINE

## 2024-11-26 PROCEDURE — 99214 OFFICE O/P EST MOD 30 MIN: CPT | Performed by: INTERNAL MEDICINE

## 2024-11-26 NOTE — PROGRESS NOTES
[facility]ematology and On[facility] [place]  Office number [phone]    Fax number [phone]     Follow up     Date: 24     Patient Name: Alena Barnes  MRN: [id number]  : 1980    Referring Physician: Dr. Honorio Noguera    Chief Complaint: recurrent right breast cancer    Cancer Staging: recurrent IA    History of Present Illness: Alena Barnes is a pleasant 44 y.o. female who presents today for evaluation of recurrent right breast cancer. She is accompanied by her supportive mom.     She has a history of a grade two 1.4 cm stage Ia invasive ductal carcinoma of the right breast (ER 95%, GA 90%, HER2/megan negative) status post lumpectomy and sentinel lymph node biopsy () in May 2021.  Margins were negative by less than 1 mm. Oncotype was 25.  Patient declined all adjuvant therapy including radiation and endocrine therapy.    She was found to have a local recurrence and underwent repeat lumpectomy showing grade 2 invasive ductal carcinoma (ER 95%, GA 80%, and HER2/megan negative).  2 sentinel lymph nodes were negative.  Final margins were negative.  She is planning adjuvant radiation.     She initially tells me that she has no interest in chemotherapy or endocrine therapy.  On further exploration her main concern is experiencing permanent menopause.  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    Interval history:  She is here for follow-up.  She is feeling worried about some new symptoms.  She notes progressive swelling sensation in her anterior neck associate with feeling that she is choking.  There have been a couple of episodes where she feels like she is unable to catch her breath through her neck.  Her primary felt that her thyroid was enlarged and referred her for an ultrasound which showed a 5 mm nodule for which no follow-up was recommended.      Although her TSH was normal she has been having several symptoms  that she attributes to possible hypothyroidism including weight gain of 20 pounds, dry skin, constipation, and feeling thirsty.      She has been recently treated with antibiotics for 2 urinary tract infections.   Daily headaches bifrontal and top of head, starts in back of neck and radiaites. Different than her usual migraine symptoms.   Had MRI spine showing reuptured disc.  She remains off of tamoxifen therapy and continues to decline further endocrine therapy.    Past Medical History:   Past Medical History:   Diagnosis Date    Backache     Blood in stool     Endometriosis     Gastritis     Hemorrhoids     Hx of radiation therapy     Ingrown nail of great toe of left foot     Malignant neoplasm of upper-outer quadrant of right breast in female, estrogen receptor positive 2021    Tinea        Past Surgical History:   Past Surgical History:   Procedure Laterality Date    BREAST LUMPECTOMY Right 2022    Rt Breast Lumpectomy - 21     SECTION      OTHER SURGICAL HISTORY      treatment of lower leg fracture       Family History:   Family History   Problem Relation Age of Onset    Alcohol abuse Mother     Cancer Mother     Mental illness Mother     Heart disease Father     Fibrocystic breast disease Other     Osteoporosis Other     Breast cancer Neg Hx     Ovarian cancer Neg Hx        Social History:   Social History     Socioeconomic History    Marital status:    Tobacco Use    Smoking status: Former     Current packs/day: 0.00     Average packs/day: 0.5 packs/day for 15.0 years (7.5 ttl pk-yrs)     Types: Cigarettes     Start date:      Quit date: 2020     Years since quittin.9     Passive exposure: Past    Smokeless tobacco: Never   Vaping Use    Vaping status: Never Used   Substance and Sexual Activity    Alcohol use: No    Drug use: Never    Sexual activity: Yes     Partners: Male     Birth control/protection: Condom       Medications:     Current Outpatient Medications:      Ascorbic Acid (VITAMIN C GUMMIES PO), Take  by mouth., Disp: , Rfl:     busPIRone (BUSPAR) 5 MG tablet, TAKE ONE TO TWO TABLETS BY MOUTH THREE TIMES A DAY AS NEEDED FOR ANXIETY (Patient taking differently: 1 tablet 2 (Two) Times a Day As Needed. TAKE ONE TO TWO TABLETS BY MOUTH THREE TIMES A DAY AS NEEDED FOR ANXIETY), Disp: 90 tablet, Rfl: 2    cyclobenzaprine (FLEXERIL) 5 MG tablet, Take 1 tablet by mouth 2 (Two) Times a Day As Needed for Muscle Spasms., Disp: 60 tablet, Rfl: 2    gabapentin (NEURONTIN) 300 MG capsule, Take 1 capsule by mouth 3 (Three) Times a Day As Needed (low back and neck pain)., Disp: 60 capsule, Rfl: 2    ibuprofen (ADVIL,MOTRIN) 800 MG tablet, Take 1 tablet by mouth Every 6 (Six) Hours As Needed for Mild Pain., Disp: 60 tablet, Rfl: 5    Multiple Vitamins-Minerals (WOMENS MULTIVITAMIN + COLLAGEN PO), Take  by mouth., Disp: , Rfl:     multivitamin (Multiple Vitamin) tablet tablet, Take 1 tablet by mouth Daily., Disp: 30 tablet, Rfl: 11    ondansetron ODT (ZOFRAN-ODT) 8 MG disintegrating tablet, Take 1/2 to 1 tablet by mouth (dissolve under tongue or swallow) every 8 hours as needed for nausea., Disp: 30 tablet, Rfl: 1    SUMAtriptan (Imitrex) 100 MG tablet, Take one tablet at onset of headache. May repeat dose one time in 2 hours if headache not relieved., Disp: 9 tablet, Rfl: 1    traMADol (ULTRAM) 50 MG tablet, Take 1 tablet by mouth 3 (Three) Times a Day As Needed for Moderate Pain or Severe Pain. moderate pain, Disp: 90 tablet, Rfl: 2    traZODone (DESYREL) 100 MG tablet, TAKE ONE TO TWO TABLETS BY MOUTH NIGHTLY ONE HOUR BEFORE BEDTIME AS NEEDED FOR SLEEP, Disp: 60 tablet, Rfl: 3    vitamin D (ERGOCALCIFEROL) 1.25 MG (22252 UT) capsule capsule, Take 1 capsule by mouth 1 (One) Time Per Week., Disp: 4 capsule, Rfl: 5    vitamin E 200 UNIT capsule, Take 1 capsule by mouth Daily., Disp: , Rfl:     Allergies:   Allergies   Allergen Reactions    Penicillins Hives       Objective     Vital  "Signs:   Vitals:    11/26/24 1419   BP: 122/81   Pulse: 53   Resp: 16   Temp: 96.9 °F (36.1 °C)   TempSrc: Infrared   SpO2: 97%   Weight: 91.5 kg (201 lb 12.8 oz)   Height: 170.2 cm (67.01\")   PainSc:   5   PainLoc: Neck    Body mass index is 31.6 kg/m².   Pain Score    11/26/24 1419   PainSc:   5   PainLoc: Neck       ECOG Performance Status: 0    Physical Exam:   General: No acute distress. Well appearing   HEENT: Normocephalic, atraumatic. Sclera anicteric.   Neck: supple, mobile anterior cervical LN  Cardiovascular: regular rate and rhythm. No murmurs.   Respiratory: Normal rate. Clear to auscultation bilaterally  Abdomen: Soft, nontender, non distended with normoactive bowel sounds  Lymph: no cervical, supraclavicular or axillary adenopathy  Neuro: Alert and oriented x 3. No focal deficits.   Ext: Symmetric, no swelling.   Psych: Euthymic  Breast: Status post right lumpectomy.   No palpable masses bilaterally.      Laboratory/Imaging Reviewed:   CT CHEST W CONTRAST DIAGNOSTIC, CT ABDOMEN PELVIS W CONTRAST     Date of Exam: 2/13/2023 11:14 AM EST     Indication: Breast cancer, invasive, stage I/II/III, initial workup.     Comparison: None available.     Technique: Axial CT images were obtained of the chest, abdomen and pelvis after the uneventful intravenous administration of 95 cc Isovue-300.  Reconstructed coronal and sagittal images were also obtained. Automated exposure control and iterative   construction methods were used.     Findings:  Chest:     No suspicious pulmonary nodules or masses are identified. Central airways are grossly patent. Visualized thyroid is unremarkable. No supraclavicular adenopathy is identified. There is a 3.5 cm mass or hematoma in the outer aspect of the right breast.   There is skin thickening over the right breast. There is a mildly prominent right axillary lymph node measuring 1.3 cm in short axis dimension. No mediastinal or hilar adenopathy is identified. Amorphous density " in the anterior mediastinal fat may be due   to residual thymic tissue. There are mild degenerative changes in the spine. No aggressive osseous lesions are identified.     Abdomen and pelvis:      The spleen, pancreas, adrenals, kidneys, liver and gallbladder have a grossly normal appearance. There is a small hiatal hernia present. There is no evidence of bowel obstruction, free air or free fluid. The appendix is normal. Right colonic fecal   retention is noted. Probable follicle cyst in the left ovary. Uterus and adnexa are otherwise grossly unremarkable. Urinary bladder is grossly unremarkable. No adenopathy is identified in the abdomen or pelvis. There is normal opacification of the   mesenteric vessels. There is mild degenerative change in the spine. There is an indeterminate 1.1 cm sclerotic lesion in the roof of the right acetabulum. Additional indeterminate sclerotic lesion in the left ilium and acetabular region. Attention to   these areas on bone scan, which has been ordered, is recommended     IMPRESSION:  Impression:  Chest:  1. 3.5 cm lobulated mass in the right breast may be the patient's known primary lesion or postsurgical, and does not measure simple fluid density. Mildly prominent right axillary lymph node measuring up to 1.3 cm.  2. No evidence of pulmonary metastatic disease.     Abdomen and pelvis:  1. Indeterminate sclerotic lesions in the roof of the right acetabulum and left ilium. Attention to these areas on pending bone scan recommended.     Electronically Signed: Kike Navarrete    2/14/2023 10:38 AM EST    Workstation ID: [id number]    DATE OF EXAM: 2/13/2023 10:22 AM EST     PROCEDURE: NM BONE SCAN WHOLE BODY     INDICATIONS: Breast cancer, invasive, stage I/II/III, initial workup     COMPARISON: CT chest, abdomen and pelvis 2/13/2023 and lumbar spine MRI 5/13/2015. CT abdomen and pelvis 3/13/2016.     TECHNIQUE: The patient received 26.7 mCi of technetium 99m MDP intravenously and 3 hour  delayed anterior and posterior whole body bone images were obtained.     FINDINGS:  There is no abnormal MDP uptake to suggest osseous metastatic disease. Specifically the acetabular areas appear normal bilaterally. In addition, sclerotic foci identified within the acetabular roof and adjacent to the right and left acetabula appear   unchanged since 2016 and are consistent with bone islands.      IMPRESSION:  No evidence of osseous metastatic disease.     Electronically Signed: Luis Miguel Yip    2/14/2023 1:31 PM EST    Workstation ID: [id number]  Lab on 11/19/2024   Component Date Value Ref Range Status    TSH 11/19/2024 1.880  0.270 - 4.200 uIU/mL Final       US Head Neck Soft Tissue    Result Date: 11/20/2024  Narrative: US HEAD NECK SOFT TISSUE Date of Exam: 11/19/2024 3:01 PM EST Indication: feels like neck swelling, tender, difficulty swallowing. Comparison: No comparisons available. Technique: Sonographic evaluation of the soft tissues of the soft tissue of the neck and thyroid was performed. Real time imaging and documentation was obtained per protocol. Findings: Right lobe measures 5.0 x 1.4 x 1.6 cm Left lobe measures 4.8 x 1.0 x 1.4 cm. Isthmus measures 2.2 mm. There is a slightly heterogeneous appearance of the thyroid parenchyma. Vascularity appears within normal limits. Right lobe of the thyroid gland demonstrates a small well-defined hypoechoic 5 mm nodule. It is unclear whether this is solid or cystic. Left lobe is grossly unremarkable in appearance.     Impression: Impression: 1. 5 mm hypoechoic nodule noted. Findings may represent a TR 4 nodule Recommend: No follow-up based upon possible TR 4 nodule of this size ACR TI-RADS  recommendations: TR5 (>= 7 points):  FNA if >= 1 cm; follow-up if 0.5-0.9 cm in 1, 2, 3, 4, and 5 years TR4 (4-6 points):  FNA if >= 1.5 cm; follow-up if 1.0-1.4 cm in 1, 2, 3, and 5 years TR3 (3 points):  FNA if >= 2.5 cm; follow-up if 1.5-2.4 cm in 1, 3, and 5 years TR2 (2  points):  No FNA or follow-up TR1 (0 points):  No FNA or follow-up ACR TI-RADS recommends that no more than two nodules with the highest ACR TI-RADS point total should be biopsied and no more than four nodules should be followed.  Electronically Signed: Pop Ahuja MD  11/20/2024 12:36 PM EST  Workstation ID: [id number]    MRI outside films    Result Date: 11/11/2024  Narrative: This procedure was auto-finalized with no dictation required.    Mammo Diagnostic Digital Tomosynthesis Bilateral With CAD    Result Date: 10/29/2024  Narrative: BILATERAL DIAGNOSTIC MAMMOGRAM  HISTORY: 44-year-old patient who presents for short interval right mammographic follow-up of presumed postoperative/radiation changes status post breast conservation surgery dated 12/19/2022. This was the patient's second right breast conservation surgery for invasive ductal carcinoma. She is currently due for bilateral mammographic imaging. The patient has no new breast complaints.  TECHNIQUE: Bilateral low dose, full field digital CC and MLO views were obtained with tomosynthesis. Left MLO focal compression, left ML, and left ML focal compression views with tomosynthesis were also obtained.  COMPARISON: 5/20/2024, 10/10/2023, 12/19/2022, 3/29/2022, 12/6/2021, 5/28/2021, and 4/14/2021  FINDINGS: The breast tissue is heterogeneously dense, which may obscure small masses.  RIGHT BREAST The fibroglandular pattern is stable. Postoperative changes being followed at the lumpectomy site in the posterior aspect of the upper outer quadrant are also stable. No significant change is noted in diffuse right breast skin thickening consistent with radiation effect. No new or suspicious mammographic findings are noted.  LEFT BREAST The fibroglandular pattern is stable. Asymmetries in the mid aspect of the superior breast on MLO imaging and mid aspect of the inferior breast on ML imaging effaced with focal compression. There are no suspicious masses, worrisome  calcifications, areas of architectural distortion, or other secondary signs of malignancy.      Impression: 1. Stable presumed postoperative/radiation changes being followed in the right breast status post breast conservation surgery dated 12/19/2022. 2. No findings suspicious for malignancy on left mammographic imaging.  RECOMMENDATION: 1. Bilateral diagnostic mammogram in 12 months. 2. Annual screening breast MRI imaging is recommended. The patient's last breast MRI study was performed on 11/8/2022.  BI-RADS CATEGORY 3, PROBABLY BENIGN.  CAD was utilized.  The standard false-negative rate of mammography is between 10% and 25%. Complex patterns or increased breast density will markedly elevate the false-negative rate of mammography.   A results letter, in lay terminology, will be given to the patient at the conclusion of the exam. ________________________________________________________________________ _ PHYSICIAN ORDER DIAGNOSTIC 12 MONTH FOLLOW UP MAMMOGRAM AND/OR BREAST ULTRASOUND. DIAGNOSIS: ABNORMAL MAMMOGRAM  This report was finalized on 10/29/2024 12:42 PM by Dr. Catherine Butler MD.       Assessment / Plan      Assessment/Plan:     1. Malignant neoplasm of upper-outer quadrant of right breast in female, estrogen receptor positive  -She has a recurrent right breast cancer that is ER positive and HER2 negative following lumpectomy with omission of adjuvant radiation, chemotherapy, and endocrine therapy.  She is willing to proceed with adjuvant radiation, but declines adjuvant chemotherapy or repeat Oncotype testing.  Initially she also declined endocrine therapy.  We discussed the increased risk of metastasis and breast cancer related mortality with the omission of adjuvant systemic therapy.  We discussed various options for endocrine therapy including ovarian suppression and an aromatase inhibitor or tamoxifen.   -She elected to proceed with reduced dose tamoxifen, but has discontinued this due to intolerable  side effects.  She is firm in her decision to decline further tamoxifen, ovarian suppression, ANJEL/BSO, aromatase number therapy.    -ANNE MARIE on exam   -Continue mammographer surveillance.  -She declines endocrine therapy.    2.  Headaches  -She has recently been found to have cervical disc disease.  She declines follow-up brain MRI for now but we may reconsider that pending her clinical course and additional labs    3.  Indeterminate adenopathy on exam  4.  Feeling of neck fullness and dysphagia  -In light of her breast cancer history we will obtain the following workup:  Orders Placed This Encounter   Procedures    CT Chest With Contrast    CT Abdomen Pelvis With Contrast    CT Soft Tissue Neck With Contrast    Comprehensive Metabolic Panel    Lactate Dehydrogenase    Thyroid Peroxidase Antibody    T4, Free    CBC & Differential   -She will follow-up with those results.  If initial workup negative, consider evaluation with ENT/GI to evaluate her symptoms      Follow Up:   With results  Hali Gar MD  Hematology and Oncology     I have spent a total of 30 minutes on the day of service including time before, during, and after the office visit on reviewing test results, preparing to see patient, counseling patient, performing medically appropriate exam and documenting clinical information in the electronic health record.

## 2024-11-28 LAB — THYROPEROXIDASE AB SERPL-ACNC: <9 IU/ML (ref 0–34)

## 2024-12-16 ENCOUNTER — OFFICE VISIT (OUTPATIENT)
Dept: NEUROSURGERY | Facility: CLINIC | Age: 44
End: 2024-12-16
Payer: COMMERCIAL

## 2024-12-16 VITALS — HEIGHT: 67 IN | BODY MASS INDEX: 31.6 KG/M2 | WEIGHT: 201.3 LBS

## 2024-12-16 DIAGNOSIS — Z72.0 TOBACCO USE: ICD-10-CM

## 2024-12-16 DIAGNOSIS — M50.30 DEGENERATION OF CERVICAL INTERVERTEBRAL DISC: Primary | ICD-10-CM

## 2024-12-16 PROCEDURE — 99204 OFFICE O/P NEW MOD 45 MIN: CPT | Performed by: STUDENT IN AN ORGANIZED HEALTH CARE EDUCATION/TRAINING PROGRAM

## 2024-12-16 NOTE — PROGRESS NOTES
Patient: April Nickie Barnes  : 1980    Primary Care Provider: Alexa Hirsch APRN    Requesting Provider: As above      Chief Complaint: Neck Pain and Arm Pain      History of Present Illness: This is a 44 y.o. female who presents with over 1 year of progressively worsening neck and bilateral upper extremity pain.  The patient scribes pain in her neck but then she also gets pain that radiates down the lateral aspect of her arms to the hand.  She feels like the symptoms are worse than the left but are present in both arms.  She is also noticed weakness in the upper extremities.  Additionally, she has developed progressively worsening migraines over the past year.  She has not had any physical therapy or injections in her neck.    PMHX  Allergies:  Allergies   Allergen Reactions    Penicillins Hives     Medications    Current Outpatient Medications:     Ascorbic Acid (VITAMIN C GUMMIES PO), Take  by mouth., Disp: , Rfl:     busPIRone (BUSPAR) 5 MG tablet, TAKE ONE TO TWO TABLETS BY MOUTH THREE TIMES A DAY AS NEEDED FOR ANXIETY (Patient taking differently: 1 tablet 2 (Two) Times a Day As Needed. TAKE ONE TO TWO TABLETS BY MOUTH THREE TIMES A DAY AS NEEDED FOR ANXIETY), Disp: 90 tablet, Rfl: 2    cyclobenzaprine (FLEXERIL) 5 MG tablet, Take 1 tablet by mouth 2 (Two) Times a Day As Needed for Muscle Spasms., Disp: 60 tablet, Rfl: 2    gabapentin (NEURONTIN) 300 MG capsule, Take 1 capsule by mouth 3 (Three) Times a Day As Needed (low back and neck pain)., Disp: 60 capsule, Rfl: 2    ibuprofen (ADVIL,MOTRIN) 800 MG tablet, Take 1 tablet by mouth Every 6 (Six) Hours As Needed for Mild Pain., Disp: 60 tablet, Rfl: 5    Multiple Vitamins-Minerals (WOMENS MULTIVITAMIN + COLLAGEN PO), Take  by mouth., Disp: , Rfl:     multivitamin (Multiple Vitamin) tablet tablet, Take 1 tablet by mouth Daily., Disp: 30 tablet, Rfl: 11    ondansetron ODT (ZOFRAN-ODT) 8 MG disintegrating tablet, Take 1/2 to 1 tablet by mouth  (dissolve under tongue or swallow) every 8 hours as needed for nausea., Disp: 30 tablet, Rfl: 1    SUMAtriptan (Imitrex) 100 MG tablet, Take one tablet at onset of headache. May repeat dose one time in 2 hours if headache not relieved., Disp: 9 tablet, Rfl: 1    traMADol (ULTRAM) 50 MG tablet, Take 1 tablet by mouth 3 (Three) Times a Day As Needed for Moderate Pain or Severe Pain. moderate pain, Disp: 90 tablet, Rfl: 2    traZODone (DESYREL) 100 MG tablet, TAKE ONE TO TWO TABLETS BY MOUTH NIGHTLY ONE HOUR BEFORE BEDTIME AS NEEDED FOR SLEEP, Disp: 60 tablet, Rfl: 3    vitamin D (ERGOCALCIFEROL) 1.25 MG (31580 UT) capsule capsule, Take 1 capsule by mouth 1 (One) Time Per Week., Disp: 4 capsule, Rfl: 5    vitamin E 200 UNIT capsule, Take 1 capsule by mouth Daily., Disp: , Rfl:   Past Medical History:  Past Medical History:   Diagnosis Date    Backache     Blood in stool     Cervical disc disorder     Endometriosis     Gastritis     Hemorrhoids     Hx of radiation therapy     Ingrown nail of great toe of left foot     Malignant neoplasm of upper-outer quadrant of right breast in female, estrogen receptor positive 2021    Tinea      Past Surgical History:  Past Surgical History:   Procedure Laterality Date    BREAST LUMPECTOMY Right 2022    Rt Breast Lumpectomy - 21     SECTION      OTHER SURGICAL HISTORY      treatment of lower leg fracture     Social Hx:  Social History     Tobacco Use    Smoking status: Former     Current packs/day: 0.00     Average packs/day: 0.5 packs/day for 15.0 years (7.5 ttl pk-yrs)     Types: Cigarettes     Start date:      Quit date: 2020     Years since quittin.9     Passive exposure: Past    Smokeless tobacco: Never   Vaping Use    Vaping status: Some Days    Substances: Nicotine   Substance Use Topics    Alcohol use: No    Drug use: Never     Family Hx:  Family History   Problem Relation Age of Onset    Alcohol abuse Mother     Cancer Mother     Mental  illness Mother     Heart disease Father     Fibrocystic breast disease Other     Osteoporosis Other     Breast cancer Neg Hx     Ovarian cancer Neg Hx      Review of Systems:        Review of Systems   Constitutional:  Negative for activity change, appetite change, chills, diaphoresis, fatigue, fever and unexpected weight change.   HENT:  Negative for congestion, dental problem, drooling, ear discharge, ear pain, facial swelling, hearing loss, mouth sores, nosebleeds, postnasal drip, rhinorrhea, sinus pressure, sinus pain, sneezing, sore throat, tinnitus, trouble swallowing and voice change.    Eyes:  Negative for photophobia, pain, discharge, redness, itching and visual disturbance.   Respiratory:  Negative for apnea, cough, choking, chest tightness, shortness of breath, wheezing and stridor.    Cardiovascular:  Negative for chest pain, palpitations and leg swelling.   Gastrointestinal:  Negative for abdominal distention, abdominal pain, anal bleeding, blood in stool, constipation, diarrhea, nausea, rectal pain and vomiting.   Endocrine: Negative for cold intolerance, heat intolerance, polydipsia, polyphagia and polyuria.   Genitourinary:  Negative for decreased urine volume, difficulty urinating, dyspareunia, dysuria, enuresis, flank pain, frequency, genital sores, hematuria, menstrual problem, pelvic pain, urgency, vaginal bleeding, vaginal discharge and vaginal pain.   Musculoskeletal:  Positive for neck pain and neck stiffness. Negative for arthralgias, back pain, gait problem, joint swelling and myalgias.   Skin:  Negative for color change, pallor, rash and wound.   Allergic/Immunologic: Negative for environmental allergies, food allergies and immunocompromised state.   Neurological:  Negative for dizziness, tremors, seizures, syncope, facial asymmetry, speech difficulty, weakness, light-headedness, numbness and headaches.   Hematological:  Negative for adenopathy. Does not bruise/bleed easily.  "  Psychiatric/Behavioral:  Negative for agitation, behavioral problems, confusion, decreased concentration, dysphoric mood, hallucinations, self-injury, sleep disturbance and suicidal ideas. The patient is not nervous/anxious and is not hyperactive.         Physical Exam:   Ht 170.2 cm (67\")   Wt 91.3 kg (201 lb 4.8 oz)   BMI 31.53 kg/m²   Awake, alert and oriented x 3  Speech f/c  Opens eyes spont  Pupils 3 mm rx bilaterally  Extraocular muscles intact bilaterally  Normal sensation to light touch in all 3 distributions of CN V bilaterally  Face symmetric bilaterally  Tongue midline  5/5 in all 4 ext  Normal sensation to light touch in all 4 ext  2+DTR's  No toro's or clonus bilaterally  No pronator drift or dysmetria  Gait not assessed    Diagnostic Studies:  All neurological imaging studies were independently reviewed unless stated otherwise    Assessment/Plan:  This is a 44 y.o. female presenting with 1 year of progressively worsening neck and bilateral upper extremity pain.  In reviewing the patient's cervical MRI, she does have a disc herniation at C5-6 which results in bilateral neuroforaminal stenosis and compression of the exiting nerve roots.  Clinically, I think the patient is having symptoms of radiculopathy from this disc herniation.  She has not tried many conservative options at this point.  I am going to prescribe her physical therapy and refer her for an epidural injection.  We will have the patient follow-up with me in approximately 2 months to see how she is doing.  If she is continuing to have upper extremity symptoms, I do think she would benefit from an ACDF.    Diagnoses and all orders for this visit:    1. Degeneration of cervical intervertebral disc (Primary)      April Nickie Barnes  reports that she quit smoking about 4 years ago. Her smoking use included cigarettes. She started smoking about 19 years ago. She has a 7.5 pack-year smoking history. She has been exposed to tobacco smoke. " She has never used smokeless tobacco.         Dwain Harris MD  12/16/24  13:13 EST

## 2024-12-23 ENCOUNTER — HOSPITAL ENCOUNTER (OUTPATIENT)
Facility: HOSPITAL | Age: 44
Discharge: HOME | End: 2024-12-23
Admitting: SURGERY
Payer: COMMERCIAL

## 2024-12-23 DIAGNOSIS — C50.411 CARCINOMA OF UPPER-OUTER QUADRANT OF FEMALE BREAST, RIGHT: ICD-10-CM

## 2024-12-23 PROCEDURE — 25510000002 GADOBENATE DIMEGLUMINE 529 MG/ML SOLUTION: Performed by: SURGERY

## 2024-12-23 PROCEDURE — C8908 MRI W/O FOL W/CONT, BREAST,: HCPCS

## 2024-12-23 PROCEDURE — A9577 INJ MULTIHANCE: HCPCS | Performed by: SURGERY

## 2024-12-23 PROCEDURE — C8937 CAD BREAST MRI: HCPCS

## 2024-12-23 RX ADMIN — GADOBENATE DIMEGLUMINE 18 ML: 529 INJECTION, SOLUTION INTRAVENOUS at 16:13

## 2024-12-30 ENCOUNTER — HOSPITAL ENCOUNTER (OUTPATIENT)
Dept: CT IMAGING | Facility: HOSPITAL | Age: 44
Discharge: HOME | End: 2024-12-30
Admitting: INTERNAL MEDICINE
Payer: COMMERCIAL

## 2024-12-30 DIAGNOSIS — C50.411 MALIGNANT NEOPLASM OF UPPER-OUTER QUADRANT OF RIGHT BREAST IN FEMALE, ESTROGEN RECEPTOR POSITIVE: ICD-10-CM

## 2024-12-30 DIAGNOSIS — Z17.0 MALIGNANT NEOPLASM OF UPPER-OUTER QUADRANT OF RIGHT BREAST IN FEMALE, ESTROGEN RECEPTOR POSITIVE: ICD-10-CM

## 2024-12-30 PROCEDURE — 25510000001 IOPAMIDOL 61 % SOLUTION: Performed by: INTERNAL MEDICINE

## 2024-12-30 PROCEDURE — 71260 CT THORAX DX C+: CPT

## 2024-12-30 PROCEDURE — 74177 CT ABD & PELVIS W/CONTRAST: CPT

## 2024-12-30 PROCEDURE — 70491 CT SOFT TISSUE NECK W/DYE: CPT

## 2024-12-30 RX ORDER — IOPAMIDOL 612 MG/ML
100 INJECTION, SOLUTION INTRAVASCULAR
Status: COMPLETED | OUTPATIENT
Start: 2024-12-30 | End: 2024-12-30

## 2024-12-30 RX ADMIN — IOPAMIDOL 85 ML: 612 INJECTION, SOLUTION INTRAVENOUS at 11:55

## 2024-12-31 ENCOUNTER — OFFICE VISIT (OUTPATIENT)
Dept: ONCOLOGY | Facility: CLINIC | Age: 44
End: 2024-12-31
Payer: COMMERCIAL

## 2024-12-31 VITALS
HEART RATE: 70 BPM | SYSTOLIC BLOOD PRESSURE: 120 MMHG | RESPIRATION RATE: 16 BRPM | TEMPERATURE: 97.6 F | DIASTOLIC BLOOD PRESSURE: 78 MMHG | HEIGHT: 67 IN | WEIGHT: 203 LBS | OXYGEN SATURATION: 98 % | BODY MASS INDEX: 31.86 KG/M2

## 2024-12-31 NOTE — PROGRESS NOTES
[facility] [place]  Office number [phone]    Fax number [phone]     Follow up     Date: 24     Patient Name: Alena Barnes  MRN: [id number]  : 1980    Referring Physician: Dr. Honorio Noguera    Chief Complaint: recurrent right breast cancer    Cancer Staging: recurrent IA    History of Present Illness: Alena Barnes is a pleasant 44 y.o. female who presents today for evaluation of recurrent right breast cancer. She is accompanied by her supportive mom.     She has a history of a grade two 1.4 cm stage Ia invasive ductal carcinoma of the right breast (ER 95%, DE 90%, HER2/megan negative) status post lumpectomy and sentinel lymph node biopsy () in May 2021.  Margins were negative by less than 1 mm. Oncotype was 25.  Patient declined all adjuvant therapy including radiation and endocrine therapy.    She was found to have a local recurrence and underwent repeat lumpectomy showing grade 2 invasive ductal carcinoma (ER 95%, DE 80%, and HER2/megan negative).  2 sentinel lymph nodes were negative.  Final margins were negative.  She is planning adjuvant radiation.     She initially tells me that she has no interest in chemotherapy or endocrine therapy.  On further exploration her main concern is experiencing permanent menopause.  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    Interval history:  She is here for follow-up and scans results. Neck pain and choking sensation worse after carrying trays at work. Having injections taking gabaptentin and limiting work, had noncontrast MRI no seaonsal allergy symptoms or GERD.   Has regained 25 lb despite previously losing 100 lb with intermittent fasting. Body mass index is 31.79 kg/m².    Had MRI spine showing reuptured disc.  She remains off of tamoxifen therapy and continues to decline further endocrine therapy.      Past Medical History:   Past Medical  History:   Diagnosis Date    Backache     Blood in stool     Cervical disc disorder     Endometriosis     Gastritis     Hemorrhoids     Hx of radiation therapy     Ingrown nail of great toe of left foot     Malignant neoplasm of upper-outer quadrant of right breast in female, estrogen receptor positive 2021    Tinea        Past Surgical History:   Past Surgical History:   Procedure Laterality Date    BREAST LUMPECTOMY Right 2022    Rt Breast Lumpectomy - 21     SECTION      OTHER SURGICAL HISTORY      treatment of lower leg fracture       Family History:   Family History   Problem Relation Age of Onset    Alcohol abuse Mother     Cancer Mother     Mental illness Mother     Heart disease Father     Fibrocystic breast disease Other     Osteoporosis Other     Breast cancer Neg Hx     Ovarian cancer Neg Hx        Social History:   Social History     Socioeconomic History    Marital status:    Tobacco Use    Smoking status: Former     Current packs/day: 0.00     Average packs/day: 0.5 packs/day for 15.0 years (7.5 ttl pk-yrs)     Types: Cigarettes     Start date:      Quit date:      Years since quittin.0     Passive exposure: Past    Smokeless tobacco: Never   Vaping Use    Vaping status: Some Days    Substances: Nicotine   Substance and Sexual Activity    Alcohol use: No    Drug use: Never    Sexual activity: Yes     Partners: Male     Birth control/protection: Condom       Medications:     Current Outpatient Medications:     Ascorbic Acid (VITAMIN C GUMMIES PO), Take  by mouth., Disp: , Rfl:     busPIRone (BUSPAR) 5 MG tablet, TAKE ONE TO TWO TABLETS BY MOUTH THREE TIMES A DAY AS NEEDED FOR ANXIETY (Patient taking differently: 1 tablet 2 (Two) Times a Day As Needed. TAKE ONE TO TWO TABLETS BY MOUTH THREE TIMES A DAY AS NEEDED FOR ANXIETY), Disp: 90 tablet, Rfl: 2    cyclobenzaprine (FLEXERIL) 5 MG tablet, Take 1 tablet by mouth 2 (Two) Times a Day As Needed for Muscle  "Spasms., Disp: 60 tablet, Rfl: 2    gabapentin (NEURONTIN) 300 MG capsule, Take 1 capsule by mouth 3 (Three) Times a Day As Needed (low back and neck pain)., Disp: 60 capsule, Rfl: 2    ibuprofen (ADVIL,MOTRIN) 800 MG tablet, Take 1 tablet by mouth Every 6 (Six) Hours As Needed for Mild Pain., Disp: 60 tablet, Rfl: 5    Multiple Vitamins-Minerals (WOMENS MULTIVITAMIN + COLLAGEN PO), Take  by mouth., Disp: , Rfl:     multivitamin (Multiple Vitamin) tablet tablet, Take 1 tablet by mouth Daily., Disp: 30 tablet, Rfl: 11    ondansetron ODT (ZOFRAN-ODT) 8 MG disintegrating tablet, Take 1/2 to 1 tablet by mouth (dissolve under tongue or swallow) every 8 hours as needed for nausea., Disp: 30 tablet, Rfl: 1    SUMAtriptan (Imitrex) 100 MG tablet, Take one tablet at onset of headache. May repeat dose one time in 2 hours if headache not relieved., Disp: 9 tablet, Rfl: 1    traMADol (ULTRAM) 50 MG tablet, Take 1 tablet by mouth 3 (Three) Times a Day As Needed for Moderate Pain or Severe Pain. moderate pain, Disp: 90 tablet, Rfl: 2    traZODone (DESYREL) 100 MG tablet, TAKE ONE TO TWO TABLETS BY MOUTH NIGHTLY ONE HOUR BEFORE BEDTIME AS NEEDED FOR SLEEP, Disp: 60 tablet, Rfl: 3    vitamin D (ERGOCALCIFEROL) 1.25 MG (89553 UT) capsule capsule, Take 1 capsule by mouth 1 (One) Time Per Week., Disp: 4 capsule, Rfl: 5    vitamin E 200 UNIT capsule, Take 1 capsule by mouth Daily., Disp: , Rfl:     Allergies:   Allergies   Allergen Reactions    Penicillins Hives       Objective     Vital Signs:   Vitals:    12/31/24 1409   BP: 120/78  Comment: LUE   Pulse: 70   Resp: 16   Temp: 97.6 °F (36.4 °C)   TempSrc: Temporal   SpO2: 98%  Comment: RA   Weight: 92.1 kg (203 lb)   Height: 170.2 cm (67\")   PainSc: 0-No pain    Body mass index is 31.79 kg/m².   Pain Score    12/31/24 1409   PainSc: 0-No pain       ECOG Performance Status: 0    Physical Exam:   Constitutional:  Well developed, Well nourished, No acute distress.    HENT:  " Normocephalic, Atraumatic.  Eyes: Conjunctivae normal.  Sclerae anicteric  Musculoskeletal: No peripheral edema.  Skin:  Warm, Dry, No erythema, No rash.   Psych: normal affect  Neuro: A and O x 3    Laboratory/Imaging Reviewed:     No visits with results within 2 Week(s) from this visit.   Latest known visit with results is:   Lab on 11/26/2024   Component Date Value Ref Range Status    Glucose 11/26/2024 87  65 - 99 mg/dL Final    BUN 11/26/2024 14  6 - 20 mg/dL Final    Creatinine 11/26/2024 0.68  0.57 - 1.00 mg/dL Final    Sodium 11/26/2024 140  136 - 145 mmol/L Final    Potassium 11/26/2024 4.2  3.5 - 5.2 mmol/L Final    Chloride 11/26/2024 107  98 - 107 mmol/L Final    CO2 11/26/2024 26.0  22.0 - 29.0 mmol/L Final    Calcium 11/26/2024 8.8  8.6 - 10.5 mg/dL Final    Total Protein 11/26/2024 6.5  6.0 - 8.5 g/dL Final    Albumin 11/26/2024 4.1  3.5 - 5.2 g/dL Final    ALT (SGPT) 11/26/2024 24  1 - 33 U/L Final    AST (SGOT) 11/26/2024 27  1 - 32 U/L Final    Alkaline Phosphatase 11/26/2024 78  39 - 117 U/L Final    Total Bilirubin 11/26/2024 0.4  0.0 - 1.2 mg/dL Final    Globulin 11/26/2024 2.4  gm/dL Final    Calculated Result    A/G Ratio 11/26/2024 1.7  g/dL Final    BUN/Creatinine Ratio 11/26/2024 20.6  7.0 - 25.0 Final    Anion Gap 11/26/2024 7.0  5.0 - 15.0 mmol/L Final    eGFR 11/26/2024 108.9  >60.0 mL/min/1.73 Final    LDH 11/26/2024 166  135 - 214 U/L Final    Thyroid Peroxidase Antibody 11/26/2024 <9  0 - 34 IU/mL Final    Free T4 11/26/2024 0.84 (L)  0.92 - 1.68 ng/dL Final    WBC 11/26/2024 4.72  3.40 - 10.80 10*3/mm3 Final    RBC 11/26/2024 4.05  3.77 - 5.28 10*6/mm3 Final    Hemoglobin 11/26/2024 12.8  12.0 - 15.9 g/dL Final    Hematocrit 11/26/2024 38.6  34.0 - 46.6 % Final    MCV 11/26/2024 95.3  79.0 - 97.0 fL Final    MCH 11/26/2024 31.6  26.6 - 33.0 pg Final    MCHC 11/26/2024 33.2  31.5 - 35.7 g/dL Final    RDW 11/26/2024 12.0 (L)  12.3 - 15.4 % Final    RDW-SD 11/26/2024 42.5  37.0 - 54.0  fl Final    MPV 11/26/2024 11.0  6.0 - 12.0 fL Final    Platelets 11/26/2024 178  140 - 450 10*3/mm3 Final    Neutrophil % 11/26/2024 65.9  42.7 - 76.0 % Final    Lymphocyte % 11/26/2024 25.2  19.6 - 45.3 % Final    Monocyte % 11/26/2024 6.8  5.0 - 12.0 % Final    Eosinophil % 11/26/2024 1.1  0.3 - 6.2 % Final    Basophil % 11/26/2024 0.8  0.0 - 1.5 % Final    Immature Grans % 11/26/2024 0.2  0.0 - 0.5 % Final    Neutrophils, Absolute 11/26/2024 3.11  1.70 - 7.00 10*3/mm3 Final    Lymphocytes, Absolute 11/26/2024 1.19  0.70 - 3.10 10*3/mm3 Final    Monocytes, Absolute 11/26/2024 0.32  0.10 - 0.90 10*3/mm3 Final    Eosinophils, Absolute 11/26/2024 0.05  0.00 - 0.40 10*3/mm3 Final    Basophils, Absolute 11/26/2024 0.04  0.00 - 0.20 10*3/mm3 Final    Immature Grans, Absolute 11/26/2024 0.01  0.00 - 0.05 10*3/mm3 Final       MRI Breast Bilateral Screening With & Without Contrast    Result Date: 12/30/2024  Narrative: BILATERAL BREAST MRI WITH CONTRAST  HISTORY: 44-year-old patient who presents for screening breast MRI imaging. She underwent right breast conservation surgery on 5/28/2021 for invasive ductal carcinoma in the upper outer quadrant. She did not undergo radiation therapy following her initial lumpectomy. The patient underwent a second right breast conservation surgery on 12/19/2022, followed by adjuvant radiation therapy, for recurrent invasive ductal carcinoma in the right upper outer quadrant. She is asymptomatic at the time of MRI imaging. The patient has gained 20 pounds since her prior breast MRI study.  TECHNIQUE:  MRI was performed on a 1.5 Giselle magnet (Siemens Beatriz) utilizing a 16-channel breast coil.  Pre-contrast spin-echo T1 weighted and STIR sequences were obtained in the axial plane.  Routine dynamic images were performed following the administration of Multihance contrast (18 ml).  Three postcontrast runs were obtained. No contrast complications occurred.  Delayed high resolution post contrast  T1 weighted sagittal images were also obtained. A CAD system (zLense) was utilized for data analysis.  The patient was scanned with her arms at her sides.  COMPARISON: Breast MRI studies dated 11/8/2022 and 5/10/2021. Mammograms dated 10/29/2024, 5/20/2024, 3/29/2022, and 3/15/2021.  FINDINGS: There is scattered bilateral fibroglandular tissue with mild background parenchymal enhancement on postcontrast imaging that is asymmetric, left greater than right. Asymmetry in BPE is consistent with the patient's history of right breast radiation therapy. Contrast is identified within the heart and great vessels. Dynamic imaging is degraded by motion.  RIGHT BREAST There are no suspicious mass or non-mass areas of contrast enhancement. The 0.5 cm oval mass in the 11:00-12:00 region (axial dynamic image 350, second run) is felt to be stable given the significant difference in positioning of the breast compared to the prior studies. The nipple is positioned laterally on the current study and is well centered on the prior studies. Expected postoperative changes are noted at the lumpectomy site in the far posterior aspect of the upper outer quadrant.  LEFT BREAST No suspicious mass or non-mass areas of contrast enhancement are visualized. There is a dominant 0.3 cm focus of contrast enhancement in the anterior aspect of the lateral breast (DynaCAD axial dynamic image 219, first run, and high-resolution sagittal image 38). No significant change is noted given differences in technique between the current study and prior 2022 and 2021 breast MRI exams. Stability is best visualized on high-resolution sagittal imaging. Furthermore, the finding is hyperintense on the STIR sequence.  EXTRAMAMMARY There is no evidence of axillary or internal mammary lymphadenopathy.      Impression: Dynamic imaging is degraded by motion and technique of the study is significantly different compared to the prior 2022 and 2021 exams. Within these  limitations, the study appears benign.  RECOMMENDATIONS: Annual screening breast MRI imaging.  BI-RADS CATEGORY 2: BENIGN.  This report was finalized on 12/30/2024 3:06 PM by Dr. Catherine Butler MD.      CT Chest With Contrast Diagnostic    Result Date: 12/30/2024  Narrative: CT CHEST W CONTRAST DIAGNOSTIC Date of Exam: 12/30/2024 11:36 AM EST Indication: breast cancer SOA. Comparison: 2/13/2023 Technique: Axial CT images were obtained of the chest after the uneventful intravenous administration of iodinated contrast.  Reconstructed coronal and sagittal images were also obtained. Automated exposure control and iterative construction methods were  used. Findings: Hellen/mediastinum: No adenopathy. No pericardial effusion. No coronary calcification Lungs/pleura: No pleural effusion or pleural tumor. Mild subpleural reticulation in the anterior right lung. Lungs otherwise clear. No suspicious pulmonary nodule Upper Abdomen: Unremarkable Bones/soft tissues: No suspicious bone lesion. Skin thickening and postoperative change of the right breast. No axillary adenopathy     Impression: 1. No active cardiopulmonary disease 2. No evidence of thoracic metastatic disease 3. Mild treatment related subpleural fibrosis in the anterior right lung 4. Postsurgical and radiation changes of the right breast Electronically Signed: Celso Hull  12/30/2024 12:28 PM EST  Workstation ID: [id number]    CT Abdomen Pelvis With Contrast    Result Date: 12/30/2024  Narrative: CT ABDOMEN PELVIS W CONTRAST Date of Exam: 12/30/2024 11:36 AM EST Indication: breast cancer, bloating, trouble swallowing. Comparison: 2/13/2023. Technique: Axial CT images were obtained of the abdomen and pelvis following the uneventful intravenous administration of 85 mL Isovue-300. Reconstructed coronal and sagittal images were also obtained. Automated exposure control and iterative construction methods were used. Findings: Lung bases are clear. There are no liver lesions.  The gallbladder and biliary tree are nondilated. The pancreas, adrenal glands, and spleen appear normal. There is a very small hiatal hernia. The stomach is incompletely distended. There are no renal lesions or hydronephrosis. The abdominal aorta is normal in size. There is no abdominal or pelvic adenopathy. There is a thin-walled 4 cm right ovarian cyst. There is no free pelvic fluid to suggest cyst rupture. The partially filled bladder appears normal. There is no large or small bowel dilatation. There is a normal appendix. There are no focal inflammatory changes. There are stable small sclerotic lesions in the roof of the right acetabulum and in the left ilium, compatible with bone islands.     Impression: Impression: Right ovarian cyst. No acute process. Electronically Signed: Rekha Collier MD  12/30/2024 12:24 PM EST  Workstation ID: [id number]    CT Soft Tissue Neck With Contrast    Result Date: 12/30/2024  Narrative: CT SOFT TISSUE NECK W CONTRAST Date of Exam: 12/30/2024 11:36 AM EST Indication: swallowing difficulty, lymphadenopathy. Comparison: None available. Technique: Axial CT images were obtained of the neck after the uneventful intravenous administration of 85 mL Isovue-300.  Reconstructed coronal and sagittal images were also obtained. Automated exposure control and iterative construction methods were used. Findings: The visualized intracranial contents are unremarkable. The globes and orbits appear intact. The nasopharynx and oropharynx have a normal configuration. The larynx appears normal. The parotid and submandibular glands appear intact. The thyroid gland appears intact. No abnormally enlarged lymph nodes are identified. The major vasculature within the neck appears widely patent. No aggressive osseous lesions are identified. The paranasal sinuses and mastoid air cells are well aerated.     Impression: Impression: 1.No evidence of cervical lymphadenopathy. 2.No acute process identified within  neck. Electronically Signed: Damián Carmona MD  12/30/2024 12:20 PM EST  Workstation ID: [id number]     Assessment / Plan      Assessment/Plan:     1. Malignant neoplasm of upper-outer quadrant of right breast in female, estrogen receptor positive  -She has a recurrent right breast cancer that is ER positive and HER2 negative following lumpectomy with omission of adjuvant radiation, chemotherapy, and endocrine therapy.  She is willing to proceed with adjuvant radiation, but declines adjuvant chemotherapy or repeat Oncotype testing.  Initially she also declined endocrine therapy.  We discussed the increased risk of metastasis and breast cancer related mortality with the omission of adjuvant systemic therapy.  We discussed various options for endocrine therapy including ovarian suppression and an aromatase inhibitor or tamoxifen.   -She elected to proceed with reduced dose tamoxifen, but has discontinued this due to intolerable side effects.  She is firm in her decision to decline further tamoxifen, ovarian suppression, ANJEL/BSO, aromatase number therapy.    -Continue mammographer surveillance.    2.  Neck pain and choking sensation  -We reviewed her CT of the neck chest abdomen pelvis showing no adenopathy.  She had a previous ultrasound showing a tiny thyroid nodule which is not likely to be generating any symptoms.  She does have a slipped disc and is planning for injections.  I also recommended an empiric trial of PPI.  Consider contrast MRI and EGD/ENT evaluation if no improvement in her symptoms    3. Body mass index is 31.79 kg/m².  Weight management referral    Follow Up:   3 mo  Hali Gar MD  Hematology and Oncology

## 2024-12-31 NOTE — PATIENT INSTRUCTIONS
Try OTC prilosec daily for 2 weeks. If no improvement in symptoms ok to stop. Ok to continue if symptoms improve.   Follow up in March if no improvement after steroid injections.

## 2025-01-03 ENCOUNTER — TELEPHONE (OUTPATIENT)
Dept: ONCOLOGY | Facility: CLINIC | Age: 45
End: 2025-01-03
Payer: COMMERCIAL

## 2025-01-03 NOTE — TELEPHONE ENCOUNTER
Advised patient per Dr. Gar regarding results of recent CT Abdomen and pelvis: thin walled cyst which is a normal finding in premenopausal women. Patient verbalized understanding.

## 2025-01-09 DIAGNOSIS — N80.9 ENDOMETRIOSIS: ICD-10-CM

## 2025-01-09 DIAGNOSIS — M54.41 CHRONIC BILATERAL LOW BACK PAIN WITH BILATERAL SCIATICA: ICD-10-CM

## 2025-01-09 DIAGNOSIS — M54.2 CERVICAL PAIN: ICD-10-CM

## 2025-01-09 DIAGNOSIS — M54.42 CHRONIC BILATERAL LOW BACK PAIN WITH BILATERAL SCIATICA: ICD-10-CM

## 2025-01-09 DIAGNOSIS — G89.29 CHRONIC BILATERAL LOW BACK PAIN WITH BILATERAL SCIATICA: ICD-10-CM

## 2025-01-09 RX ORDER — TRAMADOL HYDROCHLORIDE 50 MG/1
50 TABLET ORAL 3 TIMES DAILY PRN
Qty: 90 TABLET | Refills: 0 | Status: SHIPPED | OUTPATIENT
Start: 2025-01-09

## 2025-01-09 RX ORDER — IBUPROFEN 800 MG/1
800 TABLET, FILM COATED ORAL EVERY 6 HOURS PRN
Qty: 60 TABLET | Refills: 5 | Status: SHIPPED | OUTPATIENT
Start: 2025-01-09

## 2025-01-09 RX ORDER — GABAPENTIN 300 MG/1
300 CAPSULE ORAL 3 TIMES DAILY PRN
Qty: 60 CAPSULE | Refills: 0 | Status: SHIPPED | OUTPATIENT
Start: 2025-01-09

## 2025-01-09 NOTE — TELEPHONE ENCOUNTER
Caller: Cameron April Nickie    Relationship: Self    Best call back number: [phone]    Requested Prescriptions:   Requested Prescriptions     Pending Prescriptions Disp Refills    traMADol (ULTRAM) 50 MG tablet 90 tablet 2     Sig: Take 1 tablet by mouth 3 (Three) Times a Day As Needed for Moderate Pain or Severe Pain. moderate pain    gabapentin (NEURONTIN) 300 MG capsule 60 capsule 2     Sig: Take 1 capsule by mouth 3 (Three) Times a Day As Needed (low back and neck pain).    ibuprofen (ADVIL,MOTRIN) 800 MG tablet 60 tablet 5     Sig: Take 1 tablet by mouth Every 6 (Six) Hours As Needed for Mild Pain.        Pharmacy where request should be sent: [facility] [id number] [address] & [facility] [phone] [facility] [phone] [facility]     Last office visit with prescribing clinician: 10/7/2024   Last telemedicine visit with prescribing clinician: Visit date not found   Next office visit with prescribing clinician: 1/27/2025     Additional details provided by patient: PATIENT HAS LESS THAN 3 DAYS LEFT OF MEDICATION. PATIENT IS SCHEDULED FOR 1/27/2025 TO SEE PCP     Does the patient have less than a 3 day supply:  [x] Yes     Would you like a call back once the refill request has been completed: [] Yes [x] No    If the office needs to give you a call back, can they leave a voicemail: [] Yes [x] No    Brenda Duong MA   01/09/25 14:00 EST

## 2025-01-10 ENCOUNTER — PRIOR AUTHORIZATION (OUTPATIENT)
Dept: INTERNAL MEDICINE | Facility: CLINIC | Age: 45
End: 2025-01-10
Payer: COMMERCIAL

## 2025-01-10 NOTE — TELEPHONE ENCOUNTER
PA for traMADol (ULTRAM) 50 MG  sent to plan per covermymeds.   Awaiting determination.     Key: [id number]

## 2025-01-20 ENCOUNTER — HOSPITAL ENCOUNTER (EMERGENCY)
Facility: HOSPITAL | Age: 45
Discharge: HOME | End: 2025-01-21
Attending: EMERGENCY MEDICINE | Admitting: EMERGENCY MEDICINE
Payer: COMMERCIAL

## 2025-01-20 DIAGNOSIS — K52.9 ACUTE GASTROENTERITIS: Primary | ICD-10-CM

## 2025-01-20 DIAGNOSIS — Z79.1 NSAID LONG-TERM USE: ICD-10-CM

## 2025-01-20 DIAGNOSIS — R11.2 NAUSEA VOMITING AND DIARRHEA: ICD-10-CM

## 2025-01-20 DIAGNOSIS — R19.7 NAUSEA VOMITING AND DIARRHEA: ICD-10-CM

## 2025-01-20 DIAGNOSIS — R10.10 UPPER ABDOMINAL PAIN: ICD-10-CM

## 2025-01-20 DIAGNOSIS — M50.90 CERVICAL DISC DISORDER: ICD-10-CM

## 2025-01-20 DIAGNOSIS — E86.0 MILD DEHYDRATION: ICD-10-CM

## 2025-01-20 LAB
ALBUMIN SERPL-MCNC: 4.4 G/DL (ref 3.5–5.2)
ALBUMIN/GLOB SERPL: 1.8 G/DL
ALP SERPL-CCNC: 78 U/L (ref 39–117)
ALT SERPL W P-5'-P-CCNC: 16 U/L (ref 1–33)
ANION GAP SERPL CALCULATED.3IONS-SCNC: 13 MMOL/L (ref 5–15)
AST SERPL-CCNC: 18 U/L (ref 1–32)
BASOPHILS # BLD AUTO: 0.03 10*3/MM3 (ref 0–0.2)
BASOPHILS NFR BLD AUTO: 0.2 % (ref 0–1.5)
BILIRUB SERPL-MCNC: 0.8 MG/DL (ref 0–1.2)
BUN SERPL-MCNC: 18 MG/DL (ref 6–20)
BUN/CREAT SERPL: 32.1 (ref 7–25)
CALCIUM SPEC-SCNC: 9.2 MG/DL (ref 8.6–10.5)
CHLORIDE SERPL-SCNC: 107 MMOL/L (ref 98–107)
CO2 SERPL-SCNC: 20 MMOL/L (ref 22–29)
CREAT SERPL-MCNC: 0.56 MG/DL (ref 0.57–1)
D-LACTATE SERPL-SCNC: 1.9 MMOL/L (ref 0.5–2)
DEPRECATED RDW RBC AUTO: 39.6 FL (ref 37–54)
EGFRCR SERPLBLD CKD-EPI 2021: 113.4 ML/MIN/1.73
EOSINOPHIL # BLD AUTO: 0.02 10*3/MM3 (ref 0–0.4)
EOSINOPHIL NFR BLD AUTO: 0.2 % (ref 0.3–6.2)
ERYTHROCYTE [DISTWIDTH] IN BLOOD BY AUTOMATED COUNT: 11.9 % (ref 12.3–15.4)
ETHANOL BLD-MCNC: <10 MG/DL (ref 0–10)
FLUAV RNA RESP QL NAA+PROBE: NOT DETECTED
FLUBV RNA RESP QL NAA+PROBE: NOT DETECTED
GLOBULIN UR ELPH-MCNC: 2.4 GM/DL
GLUCOSE SERPL-MCNC: 111 MG/DL (ref 65–99)
HCG INTACT+B SERPL-ACNC: <0.1 MIU/ML
HCT VFR BLD AUTO: 43.8 % (ref 34–46.6)
HGB BLD-MCNC: 15 G/DL (ref 12–15.9)
IMM GRANULOCYTES # BLD AUTO: 0.06 10*3/MM3 (ref 0–0.05)
IMM GRANULOCYTES NFR BLD AUTO: 0.5 % (ref 0–0.5)
LIPASE SERPL-CCNC: 21 U/L (ref 13–60)
LYMPHOCYTES # BLD AUTO: 0.33 10*3/MM3 (ref 0.7–3.1)
LYMPHOCYTES NFR BLD AUTO: 2.6 % (ref 19.6–45.3)
MAGNESIUM SERPL-MCNC: 1.7 MG/DL (ref 1.6–2.6)
MCH RBC QN AUTO: 31.1 PG (ref 26.6–33)
MCHC RBC AUTO-ENTMCNC: 34.2 G/DL (ref 31.5–35.7)
MCV RBC AUTO: 90.7 FL (ref 79–97)
MONOCYTES # BLD AUTO: 0.43 10*3/MM3 (ref 0.1–0.9)
MONOCYTES NFR BLD AUTO: 3.3 % (ref 5–12)
NEUTROPHILS NFR BLD AUTO: 11.99 10*3/MM3 (ref 1.7–7)
NEUTROPHILS NFR BLD AUTO: 93.2 % (ref 42.7–76)
NRBC BLD AUTO-RTO: 0 /100 WBC (ref 0–0.2)
PLATELET # BLD AUTO: 227 10*3/MM3 (ref 140–450)
PMV BLD AUTO: 12 FL (ref 6–12)
POTASSIUM SERPL-SCNC: 3.8 MMOL/L (ref 3.5–5.2)
PROT SERPL-MCNC: 6.8 G/DL (ref 6–8.5)
RBC # BLD AUTO: 4.83 10*6/MM3 (ref 3.77–5.28)
SARS-COV-2 RNA RESP QL NAA+PROBE: NOT DETECTED
SODIUM SERPL-SCNC: 140 MMOL/L (ref 136–145)
TROPONIN T SERPL HS-MCNC: <6 NG/L
WBC NRBC COR # BLD AUTO: 12.86 10*3/MM3 (ref 3.4–10.8)

## 2025-01-20 PROCEDURE — 84484 ASSAY OF TROPONIN QUANT: CPT | Performed by: EMERGENCY MEDICINE

## 2025-01-20 PROCEDURE — 25010000002 METOCLOPRAMIDE PER 10 MG: Performed by: EMERGENCY MEDICINE

## 2025-01-20 PROCEDURE — 80053 COMPREHEN METABOLIC PANEL: CPT | Performed by: EMERGENCY MEDICINE

## 2025-01-20 PROCEDURE — 83735 ASSAY OF MAGNESIUM: CPT | Performed by: EMERGENCY MEDICINE

## 2025-01-20 PROCEDURE — 93005 ELECTROCARDIOGRAM TRACING: CPT | Performed by: EMERGENCY MEDICINE

## 2025-01-20 PROCEDURE — 83690 ASSAY OF LIPASE: CPT | Performed by: EMERGENCY MEDICINE

## 2025-01-20 PROCEDURE — 82077 ASSAY SPEC XCP UR&BREATH IA: CPT | Performed by: EMERGENCY MEDICINE

## 2025-01-20 PROCEDURE — 81025 URINE PREGNANCY TEST: CPT | Performed by: EMERGENCY MEDICINE

## 2025-01-20 PROCEDURE — 99285 EMERGENCY DEPT VISIT HI MDM: CPT

## 2025-01-20 PROCEDURE — 96375 TX/PRO/DX INJ NEW DRUG ADDON: CPT

## 2025-01-20 PROCEDURE — 87636 SARSCOV2 & INF A&B AMP PRB: CPT | Performed by: EMERGENCY MEDICINE

## 2025-01-20 PROCEDURE — 25010000002 HYDROMORPHONE PER 4 MG: Performed by: EMERGENCY MEDICINE

## 2025-01-20 PROCEDURE — 83605 ASSAY OF LACTIC ACID: CPT | Performed by: EMERGENCY MEDICINE

## 2025-01-20 PROCEDURE — 84702 CHORIONIC GONADOTROPIN TEST: CPT | Performed by: EMERGENCY MEDICINE

## 2025-01-20 PROCEDURE — 96374 THER/PROPH/DIAG INJ IV PUSH: CPT

## 2025-01-20 PROCEDURE — 85025 COMPLETE CBC W/AUTO DIFF WBC: CPT | Performed by: EMERGENCY MEDICINE

## 2025-01-20 RX ORDER — METOCLOPRAMIDE HYDROCHLORIDE 5 MG/ML
10 INJECTION INTRAMUSCULAR; INTRAVENOUS ONCE
Status: COMPLETED | OUTPATIENT
Start: 2025-01-20 | End: 2025-01-20

## 2025-01-20 RX ORDER — SODIUM CHLORIDE 0.9 % (FLUSH) 0.9 %
10 SYRINGE (ML) INJECTION AS NEEDED
Status: DISCONTINUED | OUTPATIENT
Start: 2025-01-20 | End: 2025-01-21 | Stop reason: HOSPADM

## 2025-01-20 RX ORDER — PANTOPRAZOLE SODIUM 40 MG/10ML
40 INJECTION, POWDER, LYOPHILIZED, FOR SOLUTION INTRAVENOUS ONCE
Status: COMPLETED | OUTPATIENT
Start: 2025-01-20 | End: 2025-01-20

## 2025-01-20 RX ORDER — HYDROMORPHONE HYDROCHLORIDE 1 MG/ML
0.5 INJECTION, SOLUTION INTRAMUSCULAR; INTRAVENOUS; SUBCUTANEOUS ONCE
Status: COMPLETED | OUTPATIENT
Start: 2025-01-20 | End: 2025-01-20

## 2025-01-20 RX ADMIN — PANTOPRAZOLE SODIUM 40 MG: 40 INJECTION, POWDER, FOR SOLUTION INTRAVENOUS at 23:14

## 2025-01-20 RX ADMIN — HYDROMORPHONE HYDROCHLORIDE 0.5 MG: 1 INJECTION, SOLUTION INTRAMUSCULAR; INTRAVENOUS; SUBCUTANEOUS at 23:14

## 2025-01-20 RX ADMIN — METOCLOPRAMIDE 10 MG: 5 INJECTION, SOLUTION INTRAMUSCULAR; INTRAVENOUS at 23:14

## 2025-01-21 ENCOUNTER — APPOINTMENT (OUTPATIENT)
Dept: CT IMAGING | Facility: HOSPITAL | Age: 45
End: 2025-01-21
Payer: COMMERCIAL

## 2025-01-21 VITALS
RESPIRATION RATE: 16 BRPM | DIASTOLIC BLOOD PRESSURE: 70 MMHG | BODY MASS INDEX: 31.39 KG/M2 | HEIGHT: 67 IN | TEMPERATURE: 98.1 F | OXYGEN SATURATION: 99 % | SYSTOLIC BLOOD PRESSURE: 118 MMHG | WEIGHT: 200 LBS | HEART RATE: 70 BPM

## 2025-01-21 LAB
AMPHET+METHAMPHET UR QL: NEGATIVE
AMPHETAMINES UR QL: NEGATIVE
B-HCG UR QL: NEGATIVE
BARBITURATES UR QL SCN: NEGATIVE
BENZODIAZ UR QL SCN: NEGATIVE
BILIRUB UR QL STRIP: NEGATIVE
BUPRENORPHINE SERPL-MCNC: NEGATIVE NG/ML
CANNABINOIDS SERPL QL: POSITIVE
CLARITY UR: CLEAR
COCAINE UR QL: NEGATIVE
COLOR UR: YELLOW
EXPIRATION DATE: NORMAL
FENTANYL UR-MCNC: NEGATIVE NG/ML
GLUCOSE UR STRIP-MCNC: NEGATIVE MG/DL
HGB UR QL STRIP.AUTO: NEGATIVE
INTERNAL NEGATIVE CONTROL: NORMAL
INTERNAL POSITIVE CONTROL: NORMAL
KETONES UR QL STRIP: ABNORMAL
LEUKOCYTE ESTERASE UR QL STRIP.AUTO: NEGATIVE
Lab: NORMAL
METHADONE UR QL SCN: NEGATIVE
NITRITE UR QL STRIP: NEGATIVE
OPIATES UR QL: POSITIVE
OXYCODONE UR QL SCN: NEGATIVE
PCP UR QL SCN: NEGATIVE
PH UR STRIP.AUTO: 7.5 [PH] (ref 5–8)
PROT UR QL STRIP: ABNORMAL
SP GR UR STRIP: 1.05 (ref 1–1.03)
TRICYCLICS UR QL SCN: NEGATIVE
UROBILINOGEN UR QL STRIP: ABNORMAL

## 2025-01-21 PROCEDURE — 80307 DRUG TEST PRSMV CHEM ANLYZR: CPT | Performed by: EMERGENCY MEDICINE

## 2025-01-21 PROCEDURE — 74177 CT ABD & PELVIS W/CONTRAST: CPT

## 2025-01-21 PROCEDURE — 25510000001 IOPAMIDOL 61 % SOLUTION: Performed by: EMERGENCY MEDICINE

## 2025-01-21 PROCEDURE — 81003 URINALYSIS AUTO W/O SCOPE: CPT | Performed by: EMERGENCY MEDICINE

## 2025-01-21 PROCEDURE — 96375 TX/PRO/DX INJ NEW DRUG ADDON: CPT

## 2025-01-21 PROCEDURE — 25010000002 ONDANSETRON PER 1 MG: Performed by: PHYSICIAN ASSISTANT

## 2025-01-21 RX ORDER — DICYCLOMINE HCL 20 MG
20 TABLET ORAL EVERY 6 HOURS PRN
Qty: 21 TABLET | Refills: 0 | Status: SHIPPED | OUTPATIENT
Start: 2025-01-21

## 2025-01-21 RX ORDER — IOPAMIDOL 612 MG/ML
100 INJECTION, SOLUTION INTRAVASCULAR
Status: COMPLETED | OUTPATIENT
Start: 2025-01-21 | End: 2025-01-21

## 2025-01-21 RX ORDER — PROMETHAZINE HYDROCHLORIDE 25 MG/1
25 SUPPOSITORY RECTAL EVERY 6 HOURS PRN
Qty: 10 SUPPOSITORY | Refills: 0 | Status: SHIPPED | OUTPATIENT
Start: 2025-01-21

## 2025-01-21 RX ORDER — ONDANSETRON 2 MG/ML
4 INJECTION INTRAMUSCULAR; INTRAVENOUS ONCE
Status: COMPLETED | OUTPATIENT
Start: 2025-01-21 | End: 2025-01-21

## 2025-01-21 RX ORDER — IOPAMIDOL 612 MG/ML
100 INJECTION, SOLUTION INTRAVASCULAR
Status: DISCONTINUED | OUTPATIENT
Start: 2025-01-21 | End: 2025-01-21 | Stop reason: HOSPADM

## 2025-01-21 RX ORDER — PROMETHAZINE HYDROCHLORIDE 12.5 MG/1
12.5 TABLET ORAL EVERY 6 HOURS PRN
Qty: 12 TABLET | Refills: 0 | Status: SHIPPED | OUTPATIENT
Start: 2025-01-21

## 2025-01-21 RX ADMIN — IOPAMIDOL 100 ML: 612 INJECTION, SOLUTION INTRAVENOUS at 00:25

## 2025-01-21 RX ADMIN — ONDANSETRON 4 MG: 2 INJECTION INTRAMUSCULAR; INTRAVENOUS at 01:08

## 2025-01-21 NOTE — ED PROVIDER NOTES
Subjective   History of Present Illness  This is a 45-year-old female that presents to the ER with sudden onset of upper abdominal pain and nausea/vomiting/diarrhea approximately 5 hours ago.  Patient significant other is helping with the history because patient is actively vomiting upon my initial assessment.  Patient went to her pain specialist this afternoon due to history of cervical disc disorder.  After reviewing that note, patient has been evaluated for cervical neck pain by Dr. Harris, neurosurgeon and was referred to pain management for injection therapy.  No prior neck surgery.  MRI showed disc protrusion at different levels as well as bilateral foraminal stenosis at multiple levels, as well.  Patient says that she takes routine gabapentin, ibuprofen, and tramadol for chronic pain.  She felt fine at her appointment and then around 1700 she developed sudden onset of diarrhea and vomiting.  She has had 4 loose stools and has vomited at least 20 times.  She reports sharp cramping to the epigastric and periumbilical region.  Patient denies any hematochezia or hematemesis.  She reports chills with vomiting.  She denies any recent URI symptoms.  LMP: whitley-menopausal.  Previous abdominal surgeries include .  Patient has never had a EGD or colonoscopy in the past.  Past medical history is significant for endometriosis, chronic neck pain secondary to cervical disc disorder, history of right sided breast cancer status post right lumpectomy.  Patient tells me later that her 10-year-old daughter had a recent stomach virus 2 days ago with vomiting and diarrhea.  Patient denies any suspicious food intake.  No other concerns at this time.  Patient denies any drug or alcohol use.    History provided by:  Patient and significant other  Abdominal Pain  Pain location:  Periumbilical and epigastric  Pain quality: cramping and sharp    Pain radiates to:  Does not radiate  Onset quality:  Sudden  Duration:  5  hours  Timing:  Constant  Progression:  Unchanged  Chronicity:  New  Context: previous surgery ()    Context: not alcohol use    Context comment:  Sudden onset of epigastric and periumbilical abdominal pain at 1700 with nausea/vomiting/diarrhea.  Chills without fever.  No urinary changes.  Daughter had recent stomach virus  Relieved by:  Nothing  Worsened by:  Nothing  Ineffective treatments:  None tried  Associated symptoms: anorexia, chills, diarrhea (x 4 since 1700), nausea and vomiting (x 20 since 1700)    Associated symptoms: no belching, no chest pain, no constipation, no cough, no dysuria, no fatigue, no fever, no flatus, no hematemesis, no hematochezia, no hematuria and no sore throat    Risk factors: NSAID use (Frequent NSAID use of ibuprofen 800 mg by mouth several times daily for cervical disc disorder)    Risk factors comment:  10-year-old daughter had recent stomach virus with vomiting and diarrhea 2 days ago      Review of Systems   Constitutional:  Positive for appetite change and chills. Negative for fatigue and fever.   HENT: Negative.  Negative for congestion, postnasal drip, rhinorrhea, sinus pressure, sinus pain, sneezing and sore throat.    Respiratory: Negative.  Negative for cough.    Cardiovascular: Negative.  Negative for chest pain.   Gastrointestinal:  Positive for abdominal pain (Sharp, cramping epigastric and periumbilical abdominal pain), anorexia, diarrhea (x 4 since 1700), nausea and vomiting (x 20 since 1700). Negative for constipation, flatus, hematemesis and hematochezia.   Genitourinary: Negative.  Negative for decreased urine volume, dysuria, flank pain, frequency, hematuria and urgency.   Musculoskeletal:  Positive for neck pain (Chronic neck pain from cervical disc disorder.  Patient being followed by pain management as well as Dr. Harris, neurosurgery. No previous C-spine surgery.). Negative for back pain.   Neurological: Negative.  Negative for dizziness, syncope,  light-headedness and headaches.   All other systems reviewed and are negative.      Past Medical History:   Diagnosis Date    Backache     Blood in stool     Cervical disc disorder     Endometriosis     Gastritis     Hemorrhoids     Hx of radiation therapy     Ingrown nail of great toe of left foot     Malignant neoplasm of upper-outer quadrant of right breast in female, estrogen receptor positive 2021    Tinea        Allergies   Allergen Reactions    Penicillins Hives       Past Surgical History:   Procedure Laterality Date    BREAST LUMPECTOMY Right 2022    Rt Breast Lumpectomy - 21     SECTION      OTHER SURGICAL HISTORY      treatment of lower leg fracture       Family History   Problem Relation Age of Onset    Alcohol abuse Mother     Cancer Mother     Mental illness Mother     Heart disease Father     Fibrocystic breast disease Other     Osteoporosis Other     Breast cancer Neg Hx     Ovarian cancer Neg Hx        Social History     Socioeconomic History    Marital status:    Tobacco Use    Smoking status: Former     Current packs/day: 0.00     Average packs/day: 0.5 packs/day for 15.0 years (7.5 ttl pk-yrs)     Types: Cigarettes     Start date:      Quit date:      Years since quittin.0     Passive exposure: Past    Smokeless tobacco: Never   Vaping Use    Vaping status: Some Days    Substances: Nicotine   Substance and Sexual Activity    Alcohol use: No    Drug use: Never    Sexual activity: Yes     Partners: Male     Birth control/protection: Condom           Objective   Physical Exam  Vitals and nursing note reviewed.   Constitutional:       General: She is not in acute distress.     Appearance: Normal appearance. She is ill-appearing. She is not toxic-appearing or diaphoretic.      Comments: Patient appears mildly ill.  No acute distress.  Nontoxic.   HENT:      Head: Normocephalic and atraumatic.      Nose: Nose normal.      Mouth/Throat:      Mouth: Mucous  membranes are dry.      Comments: Oral mucous membranes are slightly dry  Eyes:      Extraocular Movements: Extraocular movements intact.      Conjunctiva/sclera: Conjunctivae normal.      Pupils: Pupils are equal, round, and reactive to light.   Cardiovascular:      Rate and Rhythm: Normal rate and regular rhythm. No extrasystoles are present.     Pulses: Normal pulses.      Heart sounds: Normal heart sounds.      Comments: Regular rate and rhythm.  No ectopy  Pulmonary:      Effort: Pulmonary effort is normal.      Breath sounds: Normal breath sounds.      Comments: Lungs are clear to auscultation bilaterally  Abdominal:      General: Bowel sounds are normal. There is no distension.      Palpations: Abdomen is soft.      Tenderness: There is abdominal tenderness in the epigastric area and periumbilical area. There is no right CVA tenderness, left CVA tenderness, guarding or rebound. Negative signs include Ponce's sign, Rovsing's sign, McBurney's sign, psoas sign and obturator sign.      Hernia: No hernia is present. There is no hernia in the umbilical area.      Comments: Abdomen soft without distention or rigidity.  Active bowel sounds all 4 quadrants.  Moderate tenderness to epigastric and superior periumbilical area.  No rebound or guarding.  No right lower quadrant tenderness and negative McBurney sign.  No flank or CVA tenderness.  Abdominal exam is benign and nonsurgical.  Patient actively vomiting during my initial assessment.   Musculoskeletal:         General: Normal range of motion.      Cervical back: Normal range of motion and neck supple.      Right lower leg: No edema.      Left lower leg: No edema.   Skin:     General: Skin is warm and dry.   Neurological:      General: No focal deficit present.      Mental Status: She is alert and oriented to person, place, and time.      Cranial Nerves: Cranial nerves 2-12 are intact.      Sensory: Sensation is intact.      Motor: Motor function is intact.       Coordination: Coordination is intact.      Gait: Gait is intact.      Comments: Alert and oriented x 3.  Neuro intact and nonfocal         Procedures           ED Course  ED Course as of 01/21/25 0125   Tue Jan 21, 2025   0121 Patient is afebrile and all other vital signs are stable.  Patient actively vomiting on my initial assessment.  I personally interpreted EKG which showed sinus rhythm.  No evidence of ectopy, arrhythmia, or ischemia.  CBC shows white blood cell count of 12,000.  Chemistries were within normal limits.  BUN and creatinine were 18 and 0.56.  Sodium is 140 and potassium is 3.8.  LFTs were normal.  Lipase is 21.  Lactic acid is 1.9.  Magnesium 1.7.  High-sensitivity troponin was less than 6.  Quant hCG is negative and patient tested negative for COVID-19 and influenza.  Urinalysis revealed ketones but no microscopic blood or acute infectious process.  UDS is positive for marijuana and opiates.  CT of the abdomen/pelvis with contrast revealed no acute infectious or inflammatory process.  There was no significant stool burden and no evidence of hernia.  Stomach appears unremarkable.  Bowel loops were nondilated without wall thickening or mass and appendix was within normal limits.  No evidence of bowel obstruction.  Upon reassessment, patient feeling markedly improved after IV fluids, IV Protonix, Reglan, and low-dose of Dilaudid.  Differential diagnoses includes acute gastroenteritis with recent exposure to daughter who had stomach virus, acute gastritis from frequent NSAID use, peptic ulcer disease.  Will prescribe promethazine tablets and suppositories on discharge as directed for nausea/vomiting and we also will prescribe Bentyl 20 mg by mouth every 6 hours as needed for abdominal cramping and Rx for omeprazole 20 mg by mouth twice daily dispense 60 no refills.  Avoid ibuprofen and try Tylenol instead we will refer patient to our ambulatory GI clinic and she would benefit from upper endoscopy in  the near future.  Recommend clear liquids the next 12 to 24 hours and slowly advance to a low-fat GI diet as tolerated.  Return to the ER if worsening symptoms. [FC]      ED Course User Index  [FC] Laura Fermin PA-C                                           Recent Results (from the past 24 hours)   Comprehensive Metabolic Panel    Collection Time: 01/20/25 11:05 PM    Specimen: Blood   Result Value Ref Range    Glucose 111 (H) 65 - 99 mg/dL    BUN 18 6 - 20 mg/dL    Creatinine 0.56 (L) 0.57 - 1.00 mg/dL    Sodium 140 136 - 145 mmol/L    Potassium 3.8 3.5 - 5.2 mmol/L    Chloride 107 98 - 107 mmol/L    CO2 20.0 (L) 22.0 - 29.0 mmol/L    Calcium 9.2 8.6 - 10.5 mg/dL    Total Protein 6.8 6.0 - 8.5 g/dL    Albumin 4.4 3.5 - 5.2 g/dL    ALT (SGPT) 16 1 - 33 U/L    AST (SGOT) 18 1 - 32 U/L    Alkaline Phosphatase 78 39 - 117 U/L    Total Bilirubin 0.8 0.0 - 1.2 mg/dL    Globulin 2.4 gm/dL    A/G Ratio 1.8 g/dL    BUN/Creatinine Ratio 32.1 (H) 7.0 - 25.0    Anion Gap 13.0 5.0 - 15.0 mmol/L    eGFR 113.4 >60.0 mL/min/1.73   Lipase    Collection Time: 01/20/25 11:05 PM    Specimen: Blood   Result Value Ref Range    Lipase 21 13 - 60 U/L   hCG, Quantitative, Pregnancy    Collection Time: 01/20/25 11:05 PM    Specimen: Blood   Result Value Ref Range    HCG Quantitative <0.10 mIU/mL   Lactic Acid, Plasma    Collection Time: 01/20/25 11:05 PM    Specimen: Blood   Result Value Ref Range    Lactate 1.9 0.5 - 2.0 mmol/L   Magnesium    Collection Time: 01/20/25 11:05 PM    Specimen: Blood   Result Value Ref Range    Magnesium 1.7 1.6 - 2.6 mg/dL   High Sensitivity Troponin T    Collection Time: 01/20/25 11:05 PM    Specimen: Blood   Result Value Ref Range    HS Troponin T <6 <14 ng/L   Ethanol    Collection Time: 01/20/25 11:05 PM    Specimen: Blood   Result Value Ref Range    Ethanol <10 0 - 10 mg/dL   CBC Auto Differential    Collection Time: 01/20/25 11:05 PM    Specimen: Blood   Result Value Ref Range    WBC 12.86 (H) 3.40 -  10.80 10*3/mm3    RBC 4.83 3.77 - 5.28 10*6/mm3    Hemoglobin 15.0 12.0 - 15.9 g/dL    Hematocrit 43.8 34.0 - 46.6 %    MCV 90.7 79.0 - 97.0 fL    MCH 31.1 26.6 - 33.0 pg    MCHC 34.2 31.5 - 35.7 g/dL    RDW 11.9 (L) 12.3 - 15.4 %    RDW-SD 39.6 37.0 - 54.0 fl    MPV 12.0 6.0 - 12.0 fL    Platelets 227 140 - 450 10*3/mm3    Neutrophil % 93.2 (H) 42.7 - 76.0 %    Lymphocyte % 2.6 (L) 19.6 - 45.3 %    Monocyte % 3.3 (L) 5.0 - 12.0 %    Eosinophil % 0.2 (L) 0.3 - 6.2 %    Basophil % 0.2 0.0 - 1.5 %    Immature Grans % 0.5 0.0 - 0.5 %    Neutrophils, Absolute 11.99 (H) 1.70 - 7.00 10*3/mm3    Lymphocytes, Absolute 0.33 (L) 0.70 - 3.10 10*3/mm3    Monocytes, Absolute 0.43 0.10 - 0.90 10*3/mm3    Eosinophils, Absolute 0.02 0.00 - 0.40 10*3/mm3    Basophils, Absolute 0.03 0.00 - 0.20 10*3/mm3    Immature Grans, Absolute 0.06 (H) 0.00 - 0.05 10*3/mm3    nRBC 0.0 0.0 - 0.2 /100 WBC   COVID-19 and FLU A/B PCR, 1 HR TAT - Swab, Nasopharynx    Collection Time: 01/20/25 11:06 PM    Specimen: Nasopharynx; Swab   Result Value Ref Range    COVID19 Not Detected Not Detected - Ref. Range    Influenza A PCR Not Detected Not Detected    Influenza B PCR Not Detected Not Detected   ECG 12 Lead Other; upper abd pain, n/v    Collection Time: 01/21/25 12:05 AM   Result Value Ref Range    QT Interval 400 ms    QTC Interval 444 ms   Urinalysis With Microscopic If Indicated (No Culture) - Urine, Clean Catch    Collection Time: 01/21/25 12:37 AM    Specimen: Urine, Clean Catch   Result Value Ref Range    Color, UA Yellow Yellow, Straw    Appearance, UA Clear Clear    pH, UA 7.5 5.0 - 8.0    Specific Gravity, UA 1.048 (H) 1.001 - 1.030    Glucose, UA Negative Negative    Ketones, UA 40 mg/dL (2+) (A) Negative    Bilirubin, UA Negative Negative    Blood, UA Negative Negative    Protein, UA Trace (A) Negative    Leuk Esterase, UA Negative Negative    Nitrite, UA Negative Negative    Urobilinogen, UA 0.2 E.U./dL 0.2 - 1.0 E.U./dL   Urine Drug  Screen - Urine, Clean Catch    Collection Time: 01/21/25 12:37 AM    Specimen: Urine, Clean Catch   Result Value Ref Range    THC, Screen, Urine Positive (A) Negative    Phencyclidine (PCP), Urine Negative Negative    Cocaine Screen, Urine Negative Negative    Methamphetamine, Ur Negative Negative    Opiate Screen Positive (A) Negative    Amphetamine Screen, Urine Negative Negative    Benzodiazepine Screen, Urine Negative Negative    Tricyclic Antidepressants Screen Negative Negative    Methadone Screen, Urine Negative Negative    Barbiturates Screen, Urine Negative Negative    Oxycodone Screen, Urine Negative Negative    Buprenorphine, Screen, Urine Negative Negative   Fentanyl, Urine - Urine, Clean Catch    Collection Time: 01/21/25 12:37 AM    Specimen: Urine, Clean Catch   Result Value Ref Range    Fentanyl, Urine Negative Negative   POC Urine Pregnancy    Collection Time: 01/21/25 12:40 AM    Specimen: Urine   Result Value Ref Range    HCG, Urine, QL Negative Negative    Lot Number [id number]     Internal Positive Control Passed Positive, Passed    Internal Negative Control Passed Negative, Passed    Expiration Date 04/22/2026      Note: In addition to lab results from this visit, the labs listed above may include labs taken at another facility or during a different encounter within the last 24 hours. Please correlate lab times with ED admission and discharge times for further clarification of the services performed during this visit.    CT Abdomen Pelvis With Contrast   Final Result   Impression:   1.No acute intra-abdominal or intrapelvic process.   2.Ancillary findings as described above.                  Electronically Signed: Bharati Mazariegos MD     1/21/2025 12:42 AM EST     Workstation ID: [id number]        Vitals:    01/20/25 2222 01/20/25 2315 01/20/25 2345 01/21/25 0121   BP: (!) 141/101 140/83 126/75 118/70   BP Location: Left arm      Patient Position: Sitting      Pulse: 78 75 69 70   Resp: 22 18 16 16  "  Temp: 98.1 °F (36.7 °C)      TempSrc: Oral      SpO2: 99% 99% 100% 99%   Weight: 90.7 kg (200 lb)      Height: 170.2 cm (67\")        Medications   sodium chloride 0.9 % flush 10 mL (has no administration in time range)   iopamidol (ISOVUE-300) 61 % injection 100 mL ( Intravenous Canceled Entry 1/21/25 0037)   pantoprazole (PROTONIX) injection 40 mg (40 mg Intravenous Given 1/20/25 2314)   HYDROmorphone (DILAUDID) injection 0.5 mg (0.5 mg Intravenous Given 1/20/25 2314)   metoclopramide (REGLAN) injection 10 mg (10 mg Intravenous Given 1/20/25 2314)   iopamidol (ISOVUE-300) 61 % injection 100 mL (100 mL Intravenous Given 1/21/25 0025)   ondansetron (ZOFRAN) injection 4 mg (4 mg Intravenous Given 1/21/25 0108)     ECG/EMG Results (last 24 hours)       Procedure Component Value Units Date/Time    ECG 12 Lead Other; upper abd pain, n/v [[id number]] Collected: 01/21/25 0005     Updated: 01/21/25 0006     QT Interval 400 ms      QTC Interval 444 ms     Narrative:      Test Reason : Other~  Blood Pressure :   */*   mmHG  Vent. Rate :  74 BPM     Atrial Rate :  74 BPM     P-R Int : 132 ms          QRS Dur :  88 ms      QT Int : 400 ms       P-R-T Axes :  57  26 -25 degrees    QTcB Int : 444 ms    Normal sinus rhythm  Nonspecific ST and T wave abnormality  Abnormal ECG  No previous ECGs available    Referred By:            Confirmed By:           ECG 12 Lead Other; upper abd pain, n/v   Preliminary Result   Test Reason : Other~   Blood Pressure :   */*   mmHG   Vent. Rate :  74 BPM     Atrial Rate :  74 BPM      P-R Int : 132 ms          QRS Dur :  88 ms       QT Int : 400 ms       P-R-T Axes :  57  26 -25 degrees     QTcB Int : 444 ms      Normal sinus rhythm   Nonspecific ST and T wave abnormality   Abnormal ECG   No previous ECGs available      Referred By:            Confirmed By:                       Medical Decision Making      Final diagnoses:   Acute gastroenteritis   Upper abdominal pain   Nausea vomiting and " diarrhea   Mild dehydration   Cervical disc disorder   NSAID long-term use       ED Disposition  ED Disposition       ED Disposition   Discharge    Condition   Stable    Comment   --               Alexa Hirsch, APRN  [address] [facility] [address]  [place] [address]  [phone]    Schedule an appointment as soon as possible for a visit in 2 days  Close PCP follow-up for recheck    [facility] EMERGENCY DEPARTMENT  [address] [facility] [address] [phone]    If symptoms worsen         Medication List        New Prescriptions      dicyclomine 20 MG tablet  Commonly known as: BENTYL  Take 1 tablet by mouth Every 6 (Six) Hours As Needed for Abdominal Cramping.     omeprazole 20 MG capsule  Commonly known as: priLOSEC  Take 1 capsule by mouth 2 (Two) Times a Day.     * promethazine 12.5 MG tablet  Commonly known as: PHENERGAN  Take 1 tablet by mouth Every 6 (Six) Hours As Needed for Nausea or Vomiting.     * promethazine 25 MG suppository  Commonly known as: PHENERGAN  Insert 1 suppository into the rectum Every 6 (Six) Hours As Needed for Nausea or Vomiting.           * This list has 2 medication(s) that are the same as other medications prescribed for you. Read the directions carefully, and ask your doctor or other care provider to review them with you.                Changed      busPIRone 5 MG tablet  Commonly known as: BUSPAR  TAKE ONE TO TWO TABLETS BY MOUTH THREE TIMES A DAY AS NEEDED FOR ANXIETY  What changed:   how much to take  when to take this  reasons to take this            Stop      ibuprofen 800 MG tablet  Commonly known as: ADVILMOTRIN               Where to Get Your Medications        These medications were sent to [facility] [id number] - [place], [place] - [address] [place] [address] AT [address] - [phone]  - [phone] FX  [address] [place], [place] [address]      Phone: [phone]   dicyclomine 20 MG tablet  omeprazole 20 MG  capsule  promethazine 12.5 MG tablet  promethazine 25 MG suppository            Laura Fermin PA-C  01/21/25 0125

## 2025-01-21 NOTE — DISCHARGE INSTRUCTIONS
ER evaluation was reassuring.  CBC and chemistries were normal as well as normal electrolytes.  Urinalysis revealed no acute infectious process.  Patient had ketones, which indicate mild dehydration but there was no acute urinary tract infection.  CT scan revealed no acute infectious or inflammatory process.  Rx for Phenergan tablets and suppositories as directed for nausea and we also will prescribe Bentyl 20 mg by mouth every 6 hours as needed for cramping.  Rx for omeprazole 20 mg by mouth twice daily dispense 60 no refills to protect the stomach.  Try to avoid ibuprofen use and take Tylenol instead.  We will refer patient to our GI clinic and she would benefit from upper endoscopy in the near future to rule out peptic ulcer disease.  Avoid greasy, spicy, or fatty foods.  Return to the ER if worsening symptoms.

## 2025-01-22 LAB
QT INTERVAL: 400 MS
QTC INTERVAL: 444 MS

## 2025-01-27 ENCOUNTER — OFFICE VISIT (OUTPATIENT)
Dept: INTERNAL MEDICINE | Facility: CLINIC | Age: 45
End: 2025-01-27
Payer: COMMERCIAL

## 2025-01-27 VITALS
DIASTOLIC BLOOD PRESSURE: 76 MMHG | SYSTOLIC BLOOD PRESSURE: 118 MMHG | TEMPERATURE: 98 F | WEIGHT: 205 LBS | BODY MASS INDEX: 32.18 KG/M2 | HEIGHT: 67 IN | HEART RATE: 69 BPM | OXYGEN SATURATION: 97 %

## 2025-01-27 DIAGNOSIS — M54.2 CERVICAL PAIN: ICD-10-CM

## 2025-01-27 DIAGNOSIS — M54.41 CHRONIC BILATERAL LOW BACK PAIN WITH BILATERAL SCIATICA: ICD-10-CM

## 2025-01-27 DIAGNOSIS — G89.29 CHRONIC BILATERAL LOW BACK PAIN WITH BILATERAL SCIATICA: ICD-10-CM

## 2025-01-27 DIAGNOSIS — M54.42 CHRONIC BILATERAL LOW BACK PAIN WITH BILATERAL SCIATICA: ICD-10-CM

## 2025-01-27 DIAGNOSIS — K21.00 GASTROESOPHAGEAL REFLUX DISEASE WITH ESOPHAGITIS WITHOUT HEMORRHAGE: ICD-10-CM

## 2025-01-27 DIAGNOSIS — N80.9 ENDOMETRIOSIS: ICD-10-CM

## 2025-01-27 DIAGNOSIS — G43.109 MIGRAINE WITH AURA AND WITHOUT STATUS MIGRAINOSUS, NOT INTRACTABLE: Primary | ICD-10-CM

## 2025-01-27 PROCEDURE — 1159F MED LIST DOCD IN RCRD: CPT | Performed by: NURSE PRACTITIONER

## 2025-01-27 PROCEDURE — 1126F AMNT PAIN NOTED NONE PRSNT: CPT | Performed by: NURSE PRACTITIONER

## 2025-01-27 PROCEDURE — 1160F RVW MEDS BY RX/DR IN RCRD: CPT | Performed by: NURSE PRACTITIONER

## 2025-01-27 PROCEDURE — 99214 OFFICE O/P EST MOD 30 MIN: CPT | Performed by: NURSE PRACTITIONER

## 2025-01-27 RX ORDER — GABAPENTIN 600 MG/1
600 TABLET ORAL 3 TIMES DAILY
Qty: 90 TABLET | Refills: 2 | Status: SHIPPED | OUTPATIENT
Start: 2025-01-27

## 2025-01-27 RX ORDER — FAMOTIDINE 20 MG/1
TABLET, FILM COATED ORAL
Qty: 60 TABLET | Refills: 5 | Status: SHIPPED | OUTPATIENT
Start: 2025-01-27

## 2025-01-27 RX ORDER — TRAMADOL HYDROCHLORIDE 50 MG/1
50 TABLET ORAL 3 TIMES DAILY PRN
Qty: 90 TABLET | Refills: 2 | Status: SHIPPED | OUTPATIENT
Start: 2025-01-27

## 2025-01-27 RX ORDER — OMEPRAZOLE 40 MG/1
40 CAPSULE, DELAYED RELEASE ORAL DAILY
Qty: 30 CAPSULE | Refills: 5 | Status: SHIPPED | OUTPATIENT
Start: 2025-01-27

## 2025-01-27 NOTE — PROGRESS NOTES
Subjective   Chief Complaint   Patient presents with    [facility]        April Nickie Barnes is a 45 y.o. female.    History of Present Illness  The patient presents for evaluation of neck pain, headaches, and choking sensation.    She has been experiencing severe neck pain since her last visit in September 2024, which has led to significant discomfort. An MRI was conducted, and she sought consultation from a surgeon and a pain management specialist. She initiated physical therapy today, with the expectation that it would alleviate her headaches or arm pain. The pain management specialist suggested that these two symptoms may have distinct etiologies. The severity of her pain forced her to resign from her job of 22 years. She also reports a burning sensation in her arms. Her physical therapist suggested a possible ligament injury and recommended an open mouth x-ray. She is currently under the care of Dr. Nieto, a pain specialist. She has been taking tramadol, gabapentin, and ibuprofen 3 to 4 times daily for her neck pain. She finds relief from gabapentin and has increased her dosage from 1 to 2 tablets of 300 mg each. She is considering switching to a higher dose of gabapentin to reduce the number of pills she takes. Despite taking 3 gabapentin and 2 tramadol, she experienced severe pain and resorted to taking ibuprofen. She has been avoiding ibuprofen due to concerns about potential stomach damage but continues to take it occasionally.    She began experiencing headaches originating from the back of her neck in June 2023, for which she was prescribed Imitrex. She suspects that her headaches may be triggered by hormonal changes associated with menopause, exacerbated by her disc condition. She has never had migraines before in her life until June 2023. She had one at work and could not stand the light. She has been using Imitrex sparingly, only when necessary.    She reported a choking sensation in her throat  that started at the end of September 2024 and continued into the beginning of October 2024. This sensation was so severe on several occasions that it induced panic attacks, a symptom she does not typically experience. During a visit for a urinary tract infection, a colleague noted a swelling in her thyroid. An ultrasound revealed a thyroid nodule, but the choking sensation persisted. After palpation of the area, she experienced 2 consecutive days of intense choking sensations, leading to difficulty breathing. She contacted the clinic in early November 2024 and was advised to visit the emergency room if the symptoms recurred. She consulted her oncologist, suspecting a lymph node issue, but a CT scan showed no abnormalities. A week ago, she visited the emergency room due to uncontrollable vomiting, initially suspecting influenza. However, after another CT scan and discussion with the ER doctor, she believes the vomiting may be related to ibuprofen use. She has been taking more medication than she prefers due to her neck pain. She has a history of menstrual pain and has taken up to 40 Advil liquid gels over 2 days. She has a scheduled appointment with a gastroenterologist on 03/06/2025 to investigate a potential ulcer. She experiences the choking sensation constantly, sometimes more intensely at night. She also reports severe neck and head pain at night. She was prescribed omeprazole 20 mg twice daily by the ER and is seeking a higher dose.    MEDICATIONS  Current: Imitrex, tramadol, gabapentin, ibuprofen, omeprazole    I have reviewed the following portions of the patient's history and confirmed they are accurate: allergies, current medications, past family history, past medical history, past social history, past surgical history, and problem list    Review of Systems  Pertinent items are noted in HPI.     Current Outpatient Medications on File Prior to Visit   Medication Sig    Ascorbic Acid (VITAMIN C GUMMIES PO)  "Take  by mouth.    busPIRone (BUSPAR) 5 MG tablet TAKE ONE TO TWO TABLETS BY MOUTH THREE TIMES A DAY AS NEEDED FOR ANXIETY (Patient taking differently: 1 tablet 2 (Two) Times a Day As Needed. TAKE ONE TO TWO TABLETS BY MOUTH THREE TIMES A DAY AS NEEDED FOR ANXIETY)    cyclobenzaprine (FLEXERIL) 5 MG tablet Take 1 tablet by mouth 2 (Two) Times a Day As Needed for Muscle Spasms.    dicyclomine (BENTYL) 20 MG tablet Take 1 tablet by mouth Every 6 (Six) Hours As Needed for Abdominal Cramping.    Multiple Vitamins-Minerals (WOMENS MULTIVITAMIN + COLLAGEN PO) Take  by mouth.    multivitamin (Multiple Vitamin) tablet tablet Take 1 tablet by mouth Daily.    ondansetron ODT (ZOFRAN-ODT) 8 MG disintegrating tablet Take 1/2 to 1 tablet by mouth (dissolve under tongue or swallow) every 8 hours as needed for nausea.    promethazine (PHENERGAN) 12.5 MG tablet Take 1 tablet by mouth Every 6 (Six) Hours As Needed for Nausea or Vomiting.    promethazine (PHENERGAN) 25 MG suppository Insert 1 suppository into the rectum Every 6 (Six) Hours As Needed for Nausea or Vomiting.    SUMAtriptan (Imitrex) 100 MG tablet Take one tablet at onset of headache. May repeat dose one time in 2 hours if headache not relieved.    traZODone (DESYREL) 100 MG tablet TAKE ONE TO TWO TABLETS BY MOUTH NIGHTLY ONE HOUR BEFORE BEDTIME AS NEEDED FOR SLEEP    vitamin D (ERGOCALCIFEROL) 1.25 MG (50763 UT) capsule capsule Take 1 capsule by mouth 1 (One) Time Per Week.    vitamin E 200 UNIT capsule Take 1 capsule by mouth Daily.     No current facility-administered medications on file prior to visit.       Objective   Vitals:    01/27/25 1530   BP: 118/76   BP Location: Left arm   Patient Position: Sitting   Cuff Size: Adult   Pulse: 69   Temp: 98 °F (36.7 °C)   SpO2: 97%   Weight: 93 kg (205 lb)   Height: 170.2 cm (67.01\")     Body mass index is 32.1 kg/m².    Physical Exam  Vitals reviewed.   Constitutional:       Appearance: Normal appearance. She is " well-developed.   HENT:      Head: Normocephalic and atraumatic.      Nose: Nose normal.   Eyes:      General: Lids are normal.      Conjunctiva/sclera: Conjunctivae normal.      Pupils: Pupils are equal, round, and reactive to light.   Neck:      Thyroid: No thyromegaly.      Trachea: Trachea normal.   Cardiovascular:      Rate and Rhythm: Normal rate and regular rhythm.      Heart sounds: Normal heart sounds.   Pulmonary:      Effort: Pulmonary effort is normal. No respiratory distress.      Breath sounds: Normal breath sounds.   Musculoskeletal:      Cervical back: Tenderness present.      Lumbar back: Tenderness present.   Skin:     General: Skin is warm and dry.   Neurological:      Mental Status: She is alert and oriented to person, place, and time.      GCS: GCS eye subscore is 4. GCS verbal subscore is 5. GCS motor subscore is 6.   Psychiatric:         Attention and Perception: Attention normal.         Mood and Affect: Mood normal.         Speech: Speech normal.         Behavior: Behavior normal. Behavior is cooperative.         Thought Content: Thought content normal.         Results  Imaging  Ultrasound of thyroid found a thyroid nodule. CT scan of lymph nodes was normal.    Lab Results   Component Value Date    WBC 12.86 (H) 01/20/2025    HGB 15.0 01/20/2025    HCT 43.8 01/20/2025    MCV 90.7 01/20/2025     01/20/2025      Lab Results   Component Value Date    GLUCOSE 111 (H) 01/20/2025    BUN 18 01/20/2025    CREATININE 0.56 (L) 01/20/2025     01/20/2025    K 3.8 01/20/2025     01/20/2025    CALCIUM 9.2 01/20/2025    PROTEINTOT 6.8 01/20/2025    ALBUMIN 4.4 01/20/2025    ALT 16 01/20/2025    AST 18 01/20/2025    ALKPHOS 78 01/20/2025    BILITOT 0.8 01/20/2025    GLOB 2.4 01/20/2025    AGRATIO 1.8 01/20/2025    BCR 32.1 (H) 01/20/2025    ANIONGAP 13.0 01/20/2025    EGFR 113.4 01/20/2025      Lab Results   Component Value Date    HGBA1C 5.00 10/15/2024      Lab Results   Component Value  Date    CHOL 188 10/15/2024    TRIG 130 10/15/2024    HDL 63 (H) 10/15/2024     (H) 10/15/2024      Lab Results   Component Value Date    TSH 1.880 11/19/2024          Assessment & Plan   Problem List Items Addressed This Visit       Endometriosis    Relevant Medications    traMADol (ULTRAM) 50 MG tablet    Low back pain    Relevant Medications    traMADol (ULTRAM) 50 MG tablet     Other Visit Diagnoses       Migraine with aura and without status migrainosus, not intractable    -  Primary    Relevant Medications    ubrogepant (Ubrelvy) 100 MG tablet    gabapentin (NEURONTIN) 600 MG tablet    traMADol (ULTRAM) 50 MG tablet    Cervical pain        Relevant Medications    gabapentin (NEURONTIN) 600 MG tablet    traMADol (ULTRAM) 50 MG tablet    Gastroesophageal reflux disease with esophagitis without hemorrhage        Relevant Medications    omeprazole (priLOSEC) 40 MG capsule    famotidine (PEPCID) 20 MG tablet               Current Outpatient Medications:     traMADol (ULTRAM) 50 MG tablet, Take 1 tablet by mouth 3 (Three) Times a Day As Needed for Moderate Pain or Severe Pain. moderate pain, Disp: 90 tablet, Rfl: 2    Ascorbic Acid (VITAMIN C GUMMIES PO), Take  by mouth., Disp: , Rfl:     busPIRone (BUSPAR) 5 MG tablet, TAKE ONE TO TWO TABLETS BY MOUTH THREE TIMES A DAY AS NEEDED FOR ANXIETY (Patient taking differently: 1 tablet 2 (Two) Times a Day As Needed. TAKE ONE TO TWO TABLETS BY MOUTH THREE TIMES A DAY AS NEEDED FOR ANXIETY), Disp: 90 tablet, Rfl: 2    cyclobenzaprine (FLEXERIL) 5 MG tablet, Take 1 tablet by mouth 2 (Two) Times a Day As Needed for Muscle Spasms., Disp: 60 tablet, Rfl: 2    dicyclomine (BENTYL) 20 MG tablet, Take 1 tablet by mouth Every 6 (Six) Hours As Needed for Abdominal Cramping., Disp: 21 tablet, Rfl: 0    famotidine (PEPCID) 20 MG tablet, Take 1 tablet by mouth twice daily OR take 2 tablets by mouth once daily as needed for heartburn, Disp: 60 tablet, Rfl: 5    gabapentin  (NEURONTIN) 600 MG tablet, Take 1 tablet by mouth 3 (Three) Times a Day., Disp: 90 tablet, Rfl: 2    Multiple Vitamins-Minerals (WOMENS MULTIVITAMIN + COLLAGEN PO), Take  by mouth., Disp: , Rfl:     multivitamin (Multiple Vitamin) tablet tablet, Take 1 tablet by mouth Daily., Disp: 30 tablet, Rfl: 11    omeprazole (priLOSEC) 40 MG capsule, Take 1 capsule by mouth Daily., Disp: 30 capsule, Rfl: 5    ondansetron ODT (ZOFRAN-ODT) 8 MG disintegrating tablet, Take 1/2 to 1 tablet by mouth (dissolve under tongue or swallow) every 8 hours as needed for nausea., Disp: 30 tablet, Rfl: 1    promethazine (PHENERGAN) 12.5 MG tablet, Take 1 tablet by mouth Every 6 (Six) Hours As Needed for Nausea or Vomiting., Disp: 12 tablet, Rfl: 0    promethazine (PHENERGAN) 25 MG suppository, Insert 1 suppository into the rectum Every 6 (Six) Hours As Needed for Nausea or Vomiting., Disp: 10 suppository, Rfl: 0    SUMAtriptan (Imitrex) 100 MG tablet, Take one tablet at onset of headache. May repeat dose one time in 2 hours if headache not relieved., Disp: 9 tablet, Rfl: 1    traZODone (DESYREL) 100 MG tablet, TAKE ONE TO TWO TABLETS BY MOUTH NIGHTLY ONE HOUR BEFORE BEDTIME AS NEEDED FOR SLEEP, Disp: 60 tablet, Rfl: 3    ubrogepant (Ubrelvy) 100 MG tablet, Take one tablet by mouth at onset of headache. Can repeat in 2 hours as needed. Do not exceed 2 tablets in one day., Disp: 2 tablet, Rfl: 0    vitamin D (ERGOCALCIFEROL) 1.25 MG (11989 UT) capsule capsule, Take 1 capsule by mouth 1 (One) Time Per Week., Disp: 4 capsule, Rfl: 5    vitamin E 200 UNIT capsule, Take 1 capsule by mouth Daily., Disp: , Rfl:     Assessment & Plan  1. Cervicalgia.  Her symptoms are suggestive of a herniated disc, which is unlikely to cause significant sensitivity. However, it is plausible that her migraines are being triggered by other factors such as stress or hormonal changes associated with menopause. A prescription for gabapentin 600 mg, to be taken 3 times  daily, will be provided. She is advised to continue with her physical therapy regimen. A prescription for tramadol will also be issued.    2. Migraine.  Samples of Ubrelvy will be provided for her to trial. She is instructed to take 1 tablet at the onset of a migraine, with the option to repeat the dose after 2 hours if necessary. If Ubrelvy proves ineffective, Nurtec will be considered as an alternative.    3. Gastroesophageal reflux disease.  A prescription for omeprazole 40 mg will be provided, with instructions to take 1 tablet daily upon waking. A prescription for Pepcid will also be issued, with instructions to take 1 tablet twice daily or 2 tablets once daily, particularly when taking ibuprofen. She is advised to abstain from NSAIDs such as ibuprofen, Advil, Motrin, Aleve, Excedrin, and aspirin. If ibuprofen is necessary, it should be taken with food. She is scheduled for a GI appointment on 03/06/2025.    Patient educated on risks and side effects of taking tramadol and gabapentin including risk of addiction and sedation. Advised to use this medication sparingly.  Advised to not drive or operate heavy machinery when taking this medication. UDS and CSC completed. Filippo reviewed and appropriate.       Follow-up  The patient is scheduled for a follow-up visit in 3 months.         Plan of care reviewed with the patient at the conclusion of today's visit.  Education was provided regarding diagnosis, management, and any prescribed or recommended OTC medications.  Patient verbalized understanding of and agreement with management plan.     No follow-ups on file.      Transcribed from ambient dictation for MACY Birmingham by MACY Birmingham.  01/27/25   16:11 EST    Patient or patient representative verbalized consent for the use of Ambient Listening during the visit with  MACY Birmingham for chart documentation. 2/2/2025  21:58 EST

## 2025-02-12 DIAGNOSIS — M15.9 OSTEOARTHRITIS OF MULTIPLE JOINTS, UNSPECIFIED OSTEOARTHRITIS TYPE: ICD-10-CM

## 2025-02-12 DIAGNOSIS — M54.2 CERVICAL PAIN: Primary | ICD-10-CM

## 2025-02-12 RX ORDER — TOLMETIN SODIUM 400 MG/1
400 CAPSULE ORAL 3 TIMES DAILY PRN
Qty: 90 CAPSULE | Refills: 1 | Status: SHIPPED | OUTPATIENT
Start: 2025-02-12

## 2025-03-10 ENCOUNTER — OFFICE VISIT (OUTPATIENT)
Dept: OBSTETRICS AND GYNECOLOGY | Facility: CLINIC | Age: 45
End: 2025-03-10
Payer: COMMERCIAL

## 2025-03-10 ENCOUNTER — TELEPHONE (OUTPATIENT)
Dept: INTERNAL MEDICINE | Facility: CLINIC | Age: 45
End: 2025-03-10
Payer: COMMERCIAL

## 2025-03-10 VITALS
HEIGHT: 67 IN | DIASTOLIC BLOOD PRESSURE: 80 MMHG | SYSTOLIC BLOOD PRESSURE: 140 MMHG | BODY MASS INDEX: 32.96 KG/M2 | WEIGHT: 210 LBS

## 2025-03-10 DIAGNOSIS — N92.6 IRREGULAR MENSES: ICD-10-CM

## 2025-03-10 DIAGNOSIS — C50.411 MALIGNANT NEOPLASM OF UPPER-OUTER QUADRANT OF RIGHT BREAST IN FEMALE, ESTROGEN RECEPTOR POSITIVE: ICD-10-CM

## 2025-03-10 DIAGNOSIS — Z17.0 MALIGNANT NEOPLASM OF UPPER-OUTER QUADRANT OF RIGHT BREAST IN FEMALE, ESTROGEN RECEPTOR POSITIVE: ICD-10-CM

## 2025-03-10 DIAGNOSIS — Z01.419 PAP TEST, AS PART OF ROUTINE GYNECOLOGICAL EXAMINATION: Primary | ICD-10-CM

## 2025-03-10 RX ORDER — MAGNESIUM GLYCINATE 100 MG
CAPSULE ORAL
COMMUNITY

## 2025-03-10 NOTE — PROGRESS NOTES
Gynecologic Annual Exam Note          GYN Annual Exam     Annual Exam        Subjective     HPI  April Nickie Barnes is a 45 y.o. female, , who presents for annual well woman exam as a new patient. There were no changes to her medical or surgical history since her last visit..  Patient's last menstrual period was 2025 (approximate).   Her periods are irregular  The flow is heavy. She reports dysmenorrhea is severe occurring premenstrually and first 1-2 days of flow.     Marital Status: single. She is sexually active. She has not had new partners.. STD testing recommendations have been explained to the patient and she declines STD testing.    The patient would like to discuss the following complaints today: none. Patient diagnosed with breast cancer in the right breast in . BRCA Negative. Her periods have become irregular since last year. Her labs show she is not menopausal.  She tried low dose tamoxifen for a bit but stopped 2/2 side effects.  Very averse to the idea of surgical or medical menopause.    Additional OB/GYN History   contraceptive methods: partner is sterile  Desires to: continue contraception  History of migraines: no    Last Pap : 3/22/2021. Result: negative. HPV: unknown .   Last Completed Pap Smear            Awaiting Completion       PAP SMEAR (Every 3 Years) Order placed this encounter      03/10/2025  Order placed for LIQUID-BASED PAP SMEAR WITH HPV GENOTYPING IF ASCUS ([facility],MAD) by Alley Jim MD    10/24/2022  Done    2021  SCANNED - PAP SMEAR    2015  Done                          History of abnormal Pap smear: no  Family history of uterine, colon, breast, or ovarian cancer: no  Performs monthly Self-Breast Exam: yes  Last Breast MRI: 2024 negative    Last Completed Mammogram            Upcoming       MAMMOGRAM (Every 2 Years) Next due on 10/29/2026      10/30/2024  Order placed for Mammo Diagnostic Digital Tomosynthesis Bilateral  With CAD by Kayode Feliciano    10/29/2024  Mammo Diagnostic Digital Tomosynthesis Bilateral With CAD    05/20/2024  Mammo Diagnostic Digital Tomosynthesis Right With CAD    10/10/2023  Mammo Diagnostic Digital Tomosynthesis Bilateral With CAD    03/29/2022  Mammo Diagnostic Digital Tomosynthesis Bilateral With CAD     Only the first 5 history entries have been loaded, but more history exists.                          Colonoscopy: has had a cologuard 1 year  ago negative  Exercises Regularly: yes  Feelings of Anxiety or Depression: no  Tobacco Usage?: No       Current Outpatient Medications:     Ascorbic Acid (VITAMIN C GUMMIES PO), Take  by mouth., Disp: , Rfl:     busPIRone (BUSPAR) 5 MG tablet, TAKE ONE TO TWO TABLETS BY MOUTH THREE TIMES A DAY AS NEEDED FOR ANXIETY (Patient taking differently: 1 tablet 2 (Two) Times a Day As Needed. TAKE ONE TO TWO TABLETS BY MOUTH THREE TIMES A DAY AS NEEDED FOR ANXIETY), Disp: 90 tablet, Rfl: 2    cyclobenzaprine (FLEXERIL) 5 MG tablet, Take 1 tablet by mouth 2 (Two) Times a Day As Needed for Muscle Spasms., Disp: 60 tablet, Rfl: 2    famotidine (PEPCID) 20 MG tablet, Take 1 tablet by mouth twice daily OR take 2 tablets by mouth once daily as needed for heartburn, Disp: 60 tablet, Rfl: 5    gabapentin (NEURONTIN) 600 MG tablet, Take 1 tablet by mouth 3 (Three) Times a Day., Disp: 90 tablet, Rfl: 2    Multiple Vitamins-Minerals (WOMENS MULTIVITAMIN + COLLAGEN PO), Take  by mouth., Disp: , Rfl:     multivitamin (Multiple Vitamin) tablet tablet, Take 1 tablet by mouth Daily., Disp: 30 tablet, Rfl: 11    omeprazole (priLOSEC) 40 MG capsule, Take 1 capsule by mouth Daily., Disp: 30 capsule, Rfl: 5    ondansetron ODT (ZOFRAN-ODT) 8 MG disintegrating tablet, Take 1/2 to 1 tablet by mouth (dissolve under tongue or swallow) every 8 hours as needed for nausea., Disp: 30 tablet, Rfl: 1    SUMAtriptan (Imitrex) 100 MG tablet, Take one tablet at onset of headache. May repeat dose  one time in 2 hours if headache not relieved., Disp: 9 tablet, Rfl: 1    tolmetin (TOLECTIN) 400 MG capsule, Take 1 capsule by mouth 3 (Three) Times a Day As Needed (neck and joint pain)., Disp: 90 capsule, Rfl: 1    traMADol (ULTRAM) 50 MG tablet, Take 1 tablet by mouth 3 (Three) Times a Day As Needed for Moderate Pain or Severe Pain. moderate pain, Disp: 90 tablet, Rfl: 2    traZODone (DESYREL) 100 MG tablet, TAKE ONE TO TWO TABLETS BY MOUTH NIGHTLY ONE HOUR BEFORE BEDTIME AS NEEDED FOR SLEEP, Disp: 60 tablet, Rfl: 3    ubrogepant (Ubrelvy) 100 MG tablet, Take one tablet by mouth at onset of headache. Can repeat in 2 hours as needed. Do not exceed 2 tablets in one day., Disp: 2 tablet, Rfl: 0    vitamin D (ERGOCALCIFEROL) 1.25 MG (97145 UT) capsule capsule, Take 1 capsule by mouth 1 (One) Time Per Week., Disp: 4 capsule, Rfl: 5    Vitamin D-Vitamin K (D3 + K2) 125-100 MCG capsule, Take  by mouth., Disp: , Rfl:     vitamin E 200 UNIT capsule, Take 1 capsule by mouth Daily., Disp: , Rfl:      Patient denies the need for medication refills today.    OB History          2    Para   1    Term   1            AB   1    Living             SAB        IAB   1    Ectopic        Molar        Multiple        Live Births   1                Past Medical History:   Diagnosis Date    Backache     Blood in stool     Cervical disc disorder     Endometriosis     Gastritis     Hemorrhoids     Hx of radiation therapy     Ingrown nail of great toe of left foot     Malignant neoplasm of upper-outer quadrant of right breast in female, estrogen receptor positive 2021    Tinea         Past Surgical History:   Procedure Laterality Date    BREAST LUMPECTOMY Right 2022    Rt Breast Lumpectomy - 21     SECTION      OTHER SURGICAL HISTORY      treatment of lower leg fracture       Health Maintenance   Topic Date Due    Pneumococcal Vaccine 0-49 (1 of 2 - PCV) Never done    ANNUAL PHYSICAL  10/26/2019     "COVID-19 Vaccine (2 - Pfizer risk series) 08/30/2021    Annual Gynecologic Pelvic and Breast Exam  03/23/2022    TDAP/TD VACCINES (2 - Td or Tdap) 01/01/2024    INFLUENZA VACCINE  03/31/2025 (Originally 7/1/2024)    PAP SMEAR  10/24/2025    BMI FOLLOWUP  12/31/2025    MAMMOGRAM  10/29/2026    COLORECTAL CANCER SCREENING  07/22/2027    HEPATITIS C SCREENING  Completed       The additional following portions of the patient's history were reviewed and updated as appropriate: allergies, current medications, past family history, past medical history, past social history, past surgical history, and problem list.    Review of Systems   All other systems reviewed and are negative.      I have reviewed and agree with the HPI, ROS, and historical information as entered above. Alley Jim MD      Objective   /80   Ht 170.2 cm (67\")   Wt 95.3 kg (210 lb)   LMP 03/03/2025 (Approximate)   BMI 32.89 kg/m²     Physical Exam  General:  well developed; well nourished  no acute distress  Skin:  No suspicious lesions seen  Thyroid: normal to inspection and palpation  Breasts:  Examined in supine position  Symmetric without masses or skin dimpling but area of peau d'orange in right breast, medial aspect ~4cm x 4cm. (States present since surgery and surgeon aware and just edema)  Nipples normal without inversion, lesions or discharge  There are no palpable axillary nodes  CVS: RRR, no M/R/G, distal pulses wnl  Resp: CTAB, No W/R/R  Abdomen: soft, non-tender; no masses  no umbilical or inguinal hernias are present  no hepato-splenomegaly  Pelvis: Clinical staff was present for exam  External genitalia:  normal appearance of the external genitalia including Bartholin's and [place] Bar's glands.  Urethra: no masses or tenderness  Urethral meatus: normal size;  No lesions or signs of prolapse  Bladder: non tender to palpation, no masses, no prolapse  Vagina:  normal pink mucosa without prolapse or lesions.  Cervix:  normal " appearance.  Uterus:  normal size, shape and consistency.  Adnexa:  normal bimanual exam of the adnexa.  Perineum/Anus: normal appearance, no external hemorrhoids  Ext: 2+ pulses, no edema      Assessment and Plan    Problem List Items Addressed This Visit       Malignant neoplasm of upper-outer quadrant of right breast in female, estrogen receptor positive    Pap test, as part of routine gynecological examination - Primary    Relevant Orders    LIQUID-BASED PAP SMEAR WITH HPV GENOTYPING IF ASCUS ([facility])    Irregular menses       GYN annual well woman exam.   Pap guidelines reviewed.  Patient seeing an OB/GYN in [place] (where partner lives).  Was advised cyclic progesterone vs. Mirena.  States she plans to talk with Oncologist about this at appt in a couple weeks.  Reviewed monthly self breast exams.  Instructed to call with lumps, pain, or breast discharge.    Reviewed exercise as a preventative health measures.   Recommend ultrasound to evaluate endometrium.  Will schedule.  Return for WITH SONO.     Alley Jim MD  03/10/2025

## 2025-03-10 NOTE — TELEPHONE ENCOUNTER
Caller: Cameron April Nickie    Relationship: Self    Best call back number: [phone]    Which medication are you concerned about: gabapentin (NEURONTIN) 600 MG tablet     Who prescribed you this medication: Alexa Hirsch APRN     When did you start taking this medication: JANUARY 2025     What are your concerns: PATIENT FEELS LIKE GABAPENTIN 300 MG WORKS BETTER FOR HER. PATIENT FEELS  [facility] IS TO STRONG. REQUESTING TO GO BACK TO  [facility]

## 2025-03-11 LAB — REF LAB TEST METHOD: NORMAL

## 2025-03-13 DIAGNOSIS — G89.29 CHRONIC BILATERAL LOW BACK PAIN WITH BILATERAL SCIATICA: ICD-10-CM

## 2025-03-13 DIAGNOSIS — M54.41 CHRONIC BILATERAL LOW BACK PAIN WITH BILATERAL SCIATICA: ICD-10-CM

## 2025-03-13 DIAGNOSIS — M54.42 CHRONIC BILATERAL LOW BACK PAIN WITH BILATERAL SCIATICA: ICD-10-CM

## 2025-03-13 DIAGNOSIS — M54.2 CERVICAL PAIN: Primary | ICD-10-CM

## 2025-03-13 RX ORDER — GABAPENTIN 300 MG/1
300 CAPSULE ORAL 3 TIMES DAILY PRN
Qty: 90 CAPSULE | Refills: 0 | Status: SHIPPED | OUTPATIENT
Start: 2025-03-13

## 2025-03-25 ENCOUNTER — OFFICE VISIT (OUTPATIENT)
Dept: ONCOLOGY | Facility: CLINIC | Age: 45
End: 2025-03-25
Payer: COMMERCIAL

## 2025-03-25 VITALS
HEART RATE: 81 BPM | OXYGEN SATURATION: 99 % | DIASTOLIC BLOOD PRESSURE: 88 MMHG | HEIGHT: 67 IN | BODY MASS INDEX: 34.21 KG/M2 | SYSTOLIC BLOOD PRESSURE: 136 MMHG | WEIGHT: 218 LBS | TEMPERATURE: 97.3 F

## 2025-03-25 DIAGNOSIS — Z17.0 MALIGNANT NEOPLASM OF UPPER-OUTER QUADRANT OF RIGHT BREAST IN FEMALE, ESTROGEN RECEPTOR POSITIVE: Primary | ICD-10-CM

## 2025-03-25 DIAGNOSIS — C50.411 MALIGNANT NEOPLASM OF UPPER-OUTER QUADRANT OF RIGHT BREAST IN FEMALE, ESTROGEN RECEPTOR POSITIVE: Primary | ICD-10-CM

## 2025-03-25 PROCEDURE — 99214 OFFICE O/P EST MOD 30 MIN: CPT | Performed by: INTERNAL MEDICINE

## 2025-03-25 PROCEDURE — 1126F AMNT PAIN NOTED NONE PRSNT: CPT | Performed by: INTERNAL MEDICINE

## 2025-03-25 RX ORDER — BUPROPION HYDROCHLORIDE 150 MG/1
150 TABLET ORAL DAILY
Qty: 30 TABLET | Refills: 2 | Status: SHIPPED | OUTPATIENT
Start: 2025-03-25

## 2025-03-25 NOTE — PROGRESS NOTES
[facility] [place]  Office number [phone]    Fax number [phone]     Follow up     Date: 25     Patient Name: Alena Barnes  MRN: [id number]  : 1980    Referring Physician: Dr. Honorio Noguera    Chief Complaint: recurrent right breast cancer    Cancer Staging: Recurrent IA    History of Present Illness: Alena Barnes is a pleasant 45 y.o. female who presents today for evaluation of recurrent right breast cancer.     She has a history of a grade two 1.4 cm stage Ia invasive ductal carcinoma of the right breast (ER 95%, MO 90%, HER2/megan negative) status post lumpectomy and sentinel lymph node biopsy (0) in May 2021.  Margins were negative by less than 1 mm. Oncotype was 25.  Patient declined all adjuvant therapy including radiation and endocrine therapy.    She was found to have a local recurrence and underwent repeat lumpectomy showing grade 2 invasive ductal carcinoma (ER 95%, MO 80%, and HER2/megan negative).  2 sentinel lymph nodes were negative.  Final margins were negative.  She is planning adjuvant radiation.     She initially tells me that she has no interest in chemotherapy or endocrine therapy.  On further exploration her main concern is experiencing permanent menopause.  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    Interval history:  She is here for follow-up.   She has continued to struggle with neck pain which was of acute onset after lifting a tray at work. Remains constant despite PT x 2 mo, headaches better but neck/bicep pain persist. Had MRI at onset. Having an injection tomorrow  Anxiety, short fuse which is a change for her.   Bothered by weight gain. Had previously  lost 100 lb with fasting, no longer working for her, up 40 lb.    Constant fatigue    Seeing GYN in [place] who is recommending progesterone, testosterone, DHEA dupplements. Taking Mitolyn supplement and  considering thyroid supplement.  She remains off of tamoxifen therapy and continues to decline further endocrine therapy.      Past Medical History:   Past Medical History:   Diagnosis Date    Backache     Blood in stool     Cervical disc disorder     Endometriosis     Gastritis     Hemorrhoids     Hx of radiation therapy     Ingrown nail of great toe of left foot     Malignant neoplasm of upper-outer quadrant of right breast in female, estrogen receptor positive 2021    Tinea        Past Surgical History:   Past Surgical History:   Procedure Laterality Date    BREAST LUMPECTOMY Right 2022    Rt Breast Lumpectomy - 21     SECTION      OTHER SURGICAL HISTORY      treatment of lower leg fracture       Family History:   Family History   Problem Relation Age of Onset    Alcohol abuse Mother     Cancer Mother     Mental illness Mother     Heart disease Father     Fibrocystic breast disease Other     Osteoporosis Other     Breast cancer Neg Hx     Ovarian cancer Neg Hx        Social History:   Social History     Socioeconomic History    Marital status:     Number of children: 1   Tobacco Use    Smoking status: Former     Current packs/day: 0.00     Average packs/day: 0.5 packs/day for 15.0 years (7.5 ttl pk-yrs)     Types: Cigarettes     Start date:      Quit date:      Years since quittin.2     Passive exposure: Past    Smokeless tobacco: Never   Vaping Use    Vaping status: Some Days    Substances: Nicotine   Substance and Sexual Activity    Alcohol use: No    Drug use: Never    Sexual activity: Yes     Partners: Male     Comment: partner infertile       Medications:     Current Outpatient Medications:     Ascorbic Acid (VITAMIN C GUMMIES PO), Take  by mouth., Disp: , Rfl:     busPIRone (BUSPAR) 5 MG tablet, TAKE ONE TO TWO TABLETS BY MOUTH THREE TIMES A DAY AS NEEDED FOR ANXIETY (Patient taking differently: 1 tablet 2 (Two) Times a Day As Needed. TAKE ONE TO TWO TABLETS BY  MOUTH THREE TIMES A DAY AS NEEDED FOR ANXIETY), Disp: 90 tablet, Rfl: 2    cyclobenzaprine (FLEXERIL) 5 MG tablet, Take 1 tablet by mouth 2 (Two) Times a Day As Needed for Muscle Spasms., Disp: 60 tablet, Rfl: 2    famotidine (PEPCID) 20 MG tablet, Take 1 tablet by mouth twice daily OR take 2 tablets by mouth once daily as needed for heartburn, Disp: 60 tablet, Rfl: 5    gabapentin (NEURONTIN) 300 MG capsule, Take 1 capsule by mouth 3 (Three) Times a Day As Needed (back and neck pain)., Disp: 90 capsule, Rfl: 0    Multiple Vitamins-Minerals (WOMENS MULTIVITAMIN + COLLAGEN PO), Take  by mouth., Disp: , Rfl:     multivitamin (Multiple Vitamin) tablet tablet, Take 1 tablet by mouth Daily., Disp: 30 tablet, Rfl: 11    omeprazole (priLOSEC) 40 MG capsule, Take 1 capsule by mouth Daily., Disp: 30 capsule, Rfl: 5    ondansetron ODT (ZOFRAN-ODT) 8 MG disintegrating tablet, Take 1/2 to 1 tablet by mouth (dissolve under tongue or swallow) every 8 hours as needed for nausea., Disp: 30 tablet, Rfl: 1    SUMAtriptan (Imitrex) 100 MG tablet, Take one tablet at onset of headache. May repeat dose one time in 2 hours if headache not relieved., Disp: 9 tablet, Rfl: 1    tolmetin (TOLECTIN) 400 MG capsule, Take 1 capsule by mouth 3 (Three) Times a Day As Needed (neck and joint pain)., Disp: 90 capsule, Rfl: 1    traMADol (ULTRAM) 50 MG tablet, Take 1 tablet by mouth 3 (Three) Times a Day As Needed for Moderate Pain or Severe Pain. moderate pain, Disp: 90 tablet, Rfl: 2    traZODone (DESYREL) 100 MG tablet, TAKE ONE TO TWO TABLETS BY MOUTH NIGHTLY ONE HOUR BEFORE BEDTIME AS NEEDED FOR SLEEP, Disp: 60 tablet, Rfl: 3    ubrogepant (Ubrelvy) 100 MG tablet, Take one tablet by mouth at onset of headache. Can repeat in 2 hours as needed. Do not exceed 2 tablets in one day., Disp: 2 tablet, Rfl: 0    vitamin D (ERGOCALCIFEROL) 1.25 MG (83118 UT) capsule capsule, Take 1 capsule by mouth 1 (One) Time Per Week., Disp: 4 capsule, Rfl: 5     "Vitamin D-Vitamin K (D3 + K2) 125-100 MCG capsule, Take  by mouth., Disp: , Rfl:     vitamin E 200 UNIT capsule, Take 1 capsule by mouth Daily., Disp: , Rfl:     Allergies:   Allergies   Allergen Reactions    Penicillins Hives       Objective     Vital Signs:   Vitals:    25 1313   BP: 136/88   Pulse: 81   Temp: 97.3 °F (36.3 °C)   TempSrc: Infrared   SpO2: 99%   Weight: 98.9 kg (218 lb)   Height: 170.2 cm (67.01\")   PainSc: 0-No pain    Body mass index is 34.14 kg/m².   Pain Score    25 1313   PainSc: 0-No pain       ECOG Performance Status: 0    Physical Exam:   General: No acute distress. Well appearing.  HEENT: Normocephalic, atraumatic. Sclera anicteric.   Neck: supple, no adenopathy.   Cardiovascular: regular rate and rhythm. No murmurs.   Respiratory: Normal rate. Clear to auscultation bilaterally.  Abdomen: Soft, nontender, non distended with normoactive bowel sounds.  Lymph: no cervical, supraclavicular or axillary adenopathy.  Neuro: Alert and oriented x 3. No focal deficits.   Ext: Symmetric, no swelling.   Breast: No palpable masses in either breast or axilla.      Laboratory/Imaging Reviewed:     No visits with results within 2 Week(s) from this visit.   Latest known visit with results is:   Office Visit on 03/10/2025   Component Date Value Ref Range Status    Reference Lab Report 03/10/2025    Final                    Value:[facility]  [address] [place] [address]  Phone: [phone] or [phone]  Fax: [phone]  Carlo Jerez M.D., Medical Director    PATIENT NAME                                     LABORATORY NO.  [id number]    ESTELA.                           [id number]  [id number]                                 AGE                    SEX   SSN              CLIENT REF #  [id number]                                 45        1980      F     [id number]      [id number]    [address] [place] [address] [id number]                 REQUESTING " M.D.           ATTENDING M[place] [address]                        SHILA LILLY  DATE COLLECTED            DATE RECEIVED          DATE REPORTED  03/10/2025                03/10/2025             03/11/2025    ThinPrep Pap with Cyty[facility] Imaging    DIAGNOSIS:  Negative for intraepithelial lesion or malignancy    Multiple factors can influence accuracy of Pap tests; therefore, screening at  regular                           intervals is necessary for early cancer detection.    COMMENT:     Benign cellular changes associated with reactive/reparative changes  are present.  Professional interpretation rendered by Carlo Jerez M.D., [email or web address] at  [facility], [address] [place] [address].  SPECIMEN ADEQUACY:  SATISFACTORY FOR EVALUATION  Transformation zone is present.  SOURCE OF SPECIMEN:       CERVICAL/ENDOCERVICAL  SLIDES:  1  CLINICAL HISTORY:  Last pap 3/22/2021 negative  Pap test, as part of routine gynecological examination  Irregular menses  Malignant neoplasm of upper-outer quadrant of right breast  in female, estrogen receptor positive  Breast carcinoma  Prior pap 3/22/2021 negative      CYTOTECHNOLOGIST:             PABLO CARUSO (ASCP)                                      REVIEWED, DIAGNOSED AND  ELECTRONICALLY SIGNED BY:      Carlo Jerez M.D., F.C.A.P.    CPT CODES:  53534, 77938         No results found.    Assessment / Plan      Assessment/Plan:     1. Malignant neoplasm of upper-outer quadrant of right breast in female, estrogen receptor positive  -She has a recurrent right breast cancer that is ER positive and HER2 negative following lumpectomy with omission of adjuvant radiation, chemotherapy, and endocrine therapy.  She is willing to proceed with adjuvant radiation, but declines adjuvant chemotherapy or repeat Oncotype testing.   -She attempted tamoxifen but did not tolerate and elected to discontinue/declines further endocrine therapy  -ANNE MARIE on exam.  Initially  she also declined endocrine therapy.  We discussed the increased risk of metastasis and breast cancer related mortality with the omission of adjuvant systemic therapy.  We discussed various options for endocrine therapy including ovarian suppression and an aromatase inhibitor or tamoxifen.   -She elected to proceed with reduced dose tamoxifen, but has discontinued this due to intolerable side effects.  She is firm in her decision to decline further tamoxifen, ovarian suppression, ANJEL/BSO, aromatase number therapy.    -Continue mammogram alternating with screening MRI.  -Recommend against any hormone containing supplements    2.  Neck pain   - CT of the neck chest abdomen pelvis showing no adenopathy.  She had a previous ultrasound showing a tiny thyroid nodule which is not likely to be generating any symptoms.  She does have a slipped disc and is planning for injections.  Consider contrast MRI and EGD/ENT evaluation if no improvement in her symptoms, declines for now planning injection tomorrow.     3. Body mass index is 34.14 kg/m².  Weight management referral, rediscussed    4. Depression  Wellbutrin trial, referral to [facility]    Follow Up:   6 mo    Hali Gar MD  Hematology and Oncology

## 2025-04-11 DIAGNOSIS — N92.6 IRREGULAR MENSES: Primary | ICD-10-CM

## 2025-04-14 ENCOUNTER — TELEPHONE (OUTPATIENT)
Dept: OBSTETRICS AND GYNECOLOGY | Facility: CLINIC | Age: 45
End: 2025-04-14

## 2025-04-14 DIAGNOSIS — M54.2 CERVICAL PAIN: ICD-10-CM

## 2025-04-14 DIAGNOSIS — M15.9 OSTEOARTHRITIS OF MULTIPLE JOINTS, UNSPECIFIED OSTEOARTHRITIS TYPE: ICD-10-CM

## 2025-04-14 RX ORDER — TOLMETIN SODIUM 400 MG/1
400 CAPSULE ORAL 3 TIMES DAILY PRN
Qty: 90 CAPSULE | Refills: 1 | Status: SHIPPED | OUTPATIENT
Start: 2025-04-14

## 2025-04-14 NOTE — TELEPHONE ENCOUNTER
Provider: DR. LILLY    Caller: Alena Barnes    Relationship to Patient: Self    Pharmacy:     Phone Number: [phone]    Reason for Call: US WITH SONO AND APPOINTMENT     When was the patient last seen: 03.10.25    PATIENT CALLED IN A NEEDS TO RESCHEDULED   US WITH SONO AND APPOINTMENT THAT IS SCHEDULED FOR 04.14.25    PATIENT CAN BE REACHED .[phone]    THANK YOU

## 2025-04-15 ENCOUNTER — TELEPHONE (OUTPATIENT)
Dept: INTERNAL MEDICINE | Age: 45
End: 2025-04-15

## 2025-04-15 ENCOUNTER — OFFICE VISIT (OUTPATIENT)
Dept: INTERNAL MEDICINE | Facility: CLINIC | Age: 45
End: 2025-04-15
Payer: COMMERCIAL

## 2025-04-15 VITALS
TEMPERATURE: 97.2 F | WEIGHT: 226.4 LBS | DIASTOLIC BLOOD PRESSURE: 76 MMHG | SYSTOLIC BLOOD PRESSURE: 132 MMHG | HEIGHT: 67 IN | OXYGEN SATURATION: 98 % | BODY MASS INDEX: 35.53 KG/M2 | HEART RATE: 81 BPM

## 2025-04-15 DIAGNOSIS — M54.2 CERVICAL PAIN: Primary | ICD-10-CM

## 2025-04-15 DIAGNOSIS — M54.41 CHRONIC BILATERAL LOW BACK PAIN WITH BILATERAL SCIATICA: ICD-10-CM

## 2025-04-15 DIAGNOSIS — N80.9 ENDOMETRIOSIS: ICD-10-CM

## 2025-04-15 DIAGNOSIS — M54.42 CHRONIC BILATERAL LOW BACK PAIN WITH BILATERAL SCIATICA: ICD-10-CM

## 2025-04-15 DIAGNOSIS — J01.90 ACUTE NON-RECURRENT SINUSITIS, UNSPECIFIED LOCATION: ICD-10-CM

## 2025-04-15 DIAGNOSIS — G89.29 CHRONIC BILATERAL LOW BACK PAIN WITH BILATERAL SCIATICA: ICD-10-CM

## 2025-04-15 PROCEDURE — 1160F RVW MEDS BY RX/DR IN RCRD: CPT | Performed by: NURSE PRACTITIONER

## 2025-04-15 PROCEDURE — 99214 OFFICE O/P EST MOD 30 MIN: CPT | Performed by: NURSE PRACTITIONER

## 2025-04-15 PROCEDURE — 1126F AMNT PAIN NOTED NONE PRSNT: CPT | Performed by: NURSE PRACTITIONER

## 2025-04-15 PROCEDURE — 1159F MED LIST DOCD IN RCRD: CPT | Performed by: NURSE PRACTITIONER

## 2025-04-15 RX ORDER — TRAMADOL HYDROCHLORIDE 50 MG/1
50 TABLET ORAL 3 TIMES DAILY PRN
Qty: 90 TABLET | Refills: 2 | Status: SHIPPED | OUTPATIENT
Start: 2025-04-15

## 2025-04-15 RX ORDER — GABAPENTIN 300 MG/1
300 CAPSULE ORAL 3 TIMES DAILY PRN
Qty: 90 CAPSULE | Refills: 2 | Status: SHIPPED | OUTPATIENT
Start: 2025-04-15

## 2025-04-15 RX ORDER — AZITHROMYCIN 250 MG/1
TABLET, FILM COATED ORAL
Qty: 6 TABLET | Refills: 0 | Status: SHIPPED | OUTPATIENT
Start: 2025-04-15

## 2025-04-15 NOTE — TELEPHONE ENCOUNTER
Caller: Cameron Alena Fu    Relationship: Self    Best call back number: [phone]     What is the best time to reach you: TODAY ASAP    Who are you requesting to speak with (clinical staff, provider,  specific staff member): PCP/MA    Do you know the name of the person who called: APRIL    What was the call regarding: PATIENT HAS BEEN COMING IN EVERY 3 MONTHS FOR MEDICATION REFILLS BUT SHE THINKS THE PCP SAID LAST TIME SHE DIDN'T HAVE TO . PATIENT WANTS A CALLBACK ASAP TO ADVISE BECAUSE IF SHE DOESN'T HAVE TO SHE WANTS TO CANCEL THE APPT TODAY AT 3:45    Is it okay if the provider responds through MyChart: CALLBACK ASAP

## 2025-04-15 NOTE — TELEPHONE ENCOUNTER
Notified pt that due to being on a controlled substance she would need to be seen every 3 months.

## 2025-04-15 NOTE — PROGRESS NOTES
Subjective   Chief Complaint   Patient presents with    Med Refill        April Nickie Barnes is a 45 y.o. female.    History of Present Illness  The patient presents for evaluation of weight management, hypothyroidism, and vitamin D deficiency.    She has been experiencing difficulties with her fasting regimen, which previously aided in a weight loss of over 100 pounds that she maintained for several years. However, she has recently noticed a gradual weight gain and feels that fasting is not as effective as it used to be. She discontinued fasting in February and March, during which she gained significant weight, reaching approximately 225 pounds. She is currently under the care of an OB/GYN in [place], Dr. Griffiths, who specializes in hormone optimization. Despite her history of breast cancer, she is not undergoing hormone therapy but is receiving assistance with other aspects of her health. She has been advised to consider metformin and has been prescribed resveratrol, spermidine, fish oil with DHA, and vitamin D3 K2. She has also been prescribed Cialis for circulatory issues. She has not yet started any of these treatments. She is not taking estrogen or testosterone. She has been informed that she may benefit from a progesterone IUD to regulate her menstrual cycle, but this was not recommended by [facility] due to the presence of progesterone receptors in her tumors. She has also started taking MitoLyn and L-carnitine 1000 mg a few weeks ago.    She has been prescribed thyroid medication due to low thyroid levels detected in recent blood work. Symptoms suggestive of hypothyroidism are reported.    A history of chronically low vitamin D levels is noted, likely due to her avoidance of sun exposure. Blood work in January revealed extremely low vitamin D levels.    FAMILY HISTORY  The patient mentions that skin cancer runs in her family.  Needs nonsurgical weight management appt    I have reviewed the following portions  of the patient's history and confirmed they are accurate: allergies, current medications, past family history, past medical history, past social history, past surgical history, and problem list    Review of Systems  Pertinent items are noted in HPI.     Current Outpatient Medications on File Prior to Visit   Medication Sig    Ascorbic Acid (VITAMIN C GUMMIES PO) Take  by mouth.    buPROPion XL (Wellbutrin XL) 150 MG 24 hr tablet Take 1 tablet by mouth Daily.    busPIRone (BUSPAR) 5 MG tablet TAKE ONE TO TWO TABLETS BY MOUTH THREE TIMES A DAY AS NEEDED FOR ANXIETY (Patient taking differently: 1 tablet 2 (Two) Times a Day As Needed. TAKE ONE TO TWO TABLETS BY MOUTH THREE TIMES A DAY AS NEEDED FOR ANXIETY)    cyclobenzaprine (FLEXERIL) 5 MG tablet Take 1 tablet by mouth 2 (Two) Times a Day As Needed for Muscle Spasms.    famotidine (PEPCID) 20 MG tablet Take 1 tablet by mouth twice daily OR take 2 tablets by mouth once daily as needed for heartburn    Multiple Vitamins-Minerals (WOMENS MULTIVITAMIN + COLLAGEN PO) Take  by mouth.    multivitamin (Multiple Vitamin) tablet tablet Take 1 tablet by mouth Daily.    omeprazole (priLOSEC) 40 MG capsule Take 1 capsule by mouth Daily.    ondansetron ODT (ZOFRAN-ODT) 8 MG disintegrating tablet Take 1/2 to 1 tablet by mouth (dissolve under tongue or swallow) every 8 hours as needed for nausea.    SUMAtriptan (Imitrex) 100 MG tablet Take one tablet at onset of headache. May repeat dose one time in 2 hours if headache not relieved.    tolmetin (TOLECTIN) 400 MG capsule Take 1 capsule by mouth 3 (Three) Times a Day As Needed (neck and joint pain).    traZODone (DESYREL) 100 MG tablet TAKE ONE TO TWO TABLETS BY MOUTH NIGHTLY ONE HOUR BEFORE BEDTIME AS NEEDED FOR SLEEP    ubrogepant (Ubrelvy) 100 MG tablet Take one tablet by mouth at onset of headache. Can repeat in 2 hours as needed. Do not exceed 2 tablets in one day.    vitamin D (ERGOCALCIFEROL) 1.25 MG (28591 UT) capsule capsule  "Take 1 capsule by mouth 1 (One) Time Per Week.    Vitamin D-Vitamin K (D3 + K2) 125-100 MCG capsule Take  by mouth.    vitamin E 200 UNIT capsule Take 1 capsule by mouth Daily.    [DISCONTINUED] gabapentin (NEURONTIN) 300 MG capsule Take 1 capsule by mouth 3 (Three) Times a Day As Needed (back and neck pain).    [DISCONTINUED] traMADol (ULTRAM) 50 MG tablet Take 1 tablet by mouth 3 (Three) Times a Day As Needed for Moderate Pain or Severe Pain. moderate pain     No current facility-administered medications on file prior to visit.       Objective   Vitals:    04/15/25 1531   BP: 132/76   BP Location: Left arm   Patient Position: Sitting   Cuff Size: Large Adult   Pulse: 81   Temp: 97.2 °F (36.2 °C)   SpO2: 98%   Weight: 103 kg (226 lb 6.4 oz)   Height: 170.2 cm (67.01\")     Body mass index is 35.45 kg/m².    Physical Exam  Vitals reviewed.   Constitutional:       Appearance: Normal appearance. She is well-developed.   HENT:      Head: Normocephalic and atraumatic.      Right Ear: A middle ear effusion is present.      Left Ear: A middle ear effusion is present.      Nose: Nose normal. Mucosal edema and congestion present.      Right Sinus: Maxillary sinus tenderness and frontal sinus tenderness present.      Left Sinus: Maxillary sinus tenderness and frontal sinus tenderness present.      Mouth/Throat:      Lips: Pink.      Mouth: Mucous membranes are moist.      Pharynx: Oropharynx is clear.   Eyes:      General: Lids are normal.      Conjunctiva/sclera: Conjunctivae normal.      Pupils: Pupils are equal, round, and reactive to light.   Neck:      Thyroid: No thyromegaly.      Trachea: Trachea normal.   Cardiovascular:      Rate and Rhythm: Normal rate and regular rhythm.      Heart sounds: Normal heart sounds.   Pulmonary:      Effort: Pulmonary effort is normal. No respiratory distress.      Breath sounds: Normal breath sounds.   Skin:     General: Skin is warm and dry.   Neurological:      Mental Status: She is " alert and oriented to person, place, and time.      GCS: GCS eye subscore is 4. GCS verbal subscore is 5. GCS motor subscore is 6.   Psychiatric:         Attention and Perception: Attention normal.         Mood and Affect: Mood normal.         Speech: Speech normal.         Behavior: Behavior normal. Behavior is cooperative.         Thought Content: Thought content normal.         Results  Labs   - Thyroid level: Low   - Blood sugar: High   - Vitamin D: 01/2025, Extremely low    Lab Results   Component Value Date    WBC 12.86 (H) 01/20/2025    HGB 15.0 01/20/2025    HCT 43.8 01/20/2025    MCV 90.7 01/20/2025     01/20/2025      Lab Results   Component Value Date    GLUCOSE 111 (H) 01/20/2025    BUN 18 01/20/2025    CREATININE 0.56 (L) 01/20/2025     01/20/2025    K 3.8 01/20/2025     01/20/2025    CALCIUM 9.2 01/20/2025    PROTEINTOT 6.8 01/20/2025    ALBUMIN 4.4 01/20/2025    ALT 16 01/20/2025    AST 18 01/20/2025    ALKPHOS 78 01/20/2025    BILITOT 0.8 01/20/2025    GLOB 2.4 01/20/2025    AGRATIO 1.8 01/20/2025    BCR 32.1 (H) 01/20/2025    ANIONGAP 13.0 01/20/2025    EGFR 113.4 01/20/2025      Lab Results   Component Value Date    HGBA1C 5.00 10/15/2024      Lab Results   Component Value Date    CHOL 188 10/15/2024    TRIG 130 10/15/2024    HDL 63 (H) 10/15/2024     (H) 10/15/2024      Lab Results   Component Value Date    TSH 1.880 11/19/2024          Assessment & Plan   Problem List Items Addressed This Visit       Endometriosis    Relevant Medications    traMADol (ULTRAM) 50 MG tablet    Low back pain    Relevant Medications    gabapentin (NEURONTIN) 300 MG capsule    traMADol (ULTRAM) 50 MG tablet     Other Visit Diagnoses         Cervical pain        Relevant Medications    gabapentin (NEURONTIN) 300 MG capsule    traMADol (ULTRAM) 50 MG tablet               Current Outpatient Medications:     gabapentin (NEURONTIN) 300 MG capsule, Take 1 capsule by mouth 3 (Three) Times a Day As  Needed (back and neck pain)., Disp: 90 capsule, Rfl: 2    traMADol (ULTRAM) 50 MG tablet, Take 1 tablet by mouth 3 (Three) Times a Day As Needed for Moderate Pain or Severe Pain. moderate pain, Disp: 90 tablet, Rfl: 2    Ascorbic Acid (VITAMIN C GUMMIES PO), Take  by mouth., Disp: , Rfl:     buPROPion XL (Wellbutrin XL) 150 MG 24 hr tablet, Take 1 tablet by mouth Daily., Disp: 30 tablet, Rfl: 2    busPIRone (BUSPAR) 5 MG tablet, TAKE ONE TO TWO TABLETS BY MOUTH THREE TIMES A DAY AS NEEDED FOR ANXIETY (Patient taking differently: 1 tablet 2 (Two) Times a Day As Needed. TAKE ONE TO TWO TABLETS BY MOUTH THREE TIMES A DAY AS NEEDED FOR ANXIETY), Disp: 90 tablet, Rfl: 2    cyclobenzaprine (FLEXERIL) 5 MG tablet, Take 1 tablet by mouth 2 (Two) Times a Day As Needed for Muscle Spasms., Disp: 60 tablet, Rfl: 2    famotidine (PEPCID) 20 MG tablet, Take 1 tablet by mouth twice daily OR take 2 tablets by mouth once daily as needed for heartburn, Disp: 60 tablet, Rfl: 5    Multiple Vitamins-Minerals (WOMENS MULTIVITAMIN + COLLAGEN PO), Take  by mouth., Disp: , Rfl:     multivitamin (Multiple Vitamin) tablet tablet, Take 1 tablet by mouth Daily., Disp: 30 tablet, Rfl: 11    omeprazole (priLOSEC) 40 MG capsule, Take 1 capsule by mouth Daily., Disp: 30 capsule, Rfl: 5    ondansetron ODT (ZOFRAN-ODT) 8 MG disintegrating tablet, Take 1/2 to 1 tablet by mouth (dissolve under tongue or swallow) every 8 hours as needed for nausea., Disp: 30 tablet, Rfl: 1    SUMAtriptan (Imitrex) 100 MG tablet, Take one tablet at onset of headache. May repeat dose one time in 2 hours if headache not relieved., Disp: 9 tablet, Rfl: 1    tolmetin (TOLECTIN) 400 MG capsule, Take 1 capsule by mouth 3 (Three) Times a Day As Needed (neck and joint pain)., Disp: 90 capsule, Rfl: 1    traZODone (DESYREL) 100 MG tablet, TAKE ONE TO TWO TABLETS BY MOUTH NIGHTLY ONE HOUR BEFORE BEDTIME AS NEEDED FOR SLEEP, Disp: 60 tablet, Rfl: 3    ubrogepant (Ubrelvy) 100 MG  tablet, Take one tablet by mouth at onset of headache. Can repeat in 2 hours as needed. Do not exceed 2 tablets in one day., Disp: 2 tablet, Rfl: 0    vitamin D (ERGOCALCIFEROL) 1.25 MG (00134 UT) capsule capsule, Take 1 capsule by mouth 1 (One) Time Per Week., Disp: 4 capsule, Rfl: 5    Vitamin D-Vitamin K (D3 + K2) 125-100 MCG capsule, Take  by mouth., Disp: , Rfl:     vitamin E 200 UNIT capsule, Take 1 capsule by mouth Daily., Disp: , Rfl:     Assessment & Plan  1. Weight management.  - Experiencing weight gain despite previous success with fasting.  - Currently seeing an OB/GYN in [place] specializing in hormone optimization.  - Advised to consider metformin for its benefits in blood sugar stabilization and weight loss.  - Will continue with current supplements, including resveratrol, spermidine, fish oil with DHA, vitamin D3 K2, and a comprehensive multivitamin, as well as MitoLyn and L-carnitine 1000 mg.    2. Thyroid medication  - Prescribed thyroid medication by OB/GYN, but thyroid levels were within normal range during last visit in 11/2024.  - Advised to exercise caution with thyroid medication due to risk of long term thyroid dysfunction.  - Thyroid panel will be ordered to assess current thyroid function; thyroid medication will be managed by this provider if needed.    3. Vitamin D deficiency.  - Chronically low vitamin D levels and avoids sun exposure due to family history of skin cancer.  - Will continue taking vitamin D3 K2 as prescribed by OB/GYN.    4. Medication management.  - Prescribed Cialis for circulatory issues and libido enhancement.  - Potential side effects, including lowered blood pressure, have been discussed.  - Will continue with this medication as it is not expected to cause harm.    5. Cervical, back pain, and endometriosis.   Continue gabapentin, tramadol, tolectin, and flexeril. Patient has achieved at least 50% improvement in pain with the tramadol and gabapentin.     Patient  educated on risks and side effects of taking controlled substance including risk of addiction and dependence. Advised to use this medication sparingly and/or as prescribed.  Advised to not drive or operate heavy machinery when taking controlled substances. UDS and Gn[employer] CSC up to date. JACQUELIN reviewed and appropriate. JACQUELIN is updated every 3 months. I will continue to see patient for regular follow up appointments.  They are well controlled on their medication.             Plan of care reviewed with the patient at the conclusion of today's visit.  Education was provided regarding diagnosis, management, and any prescribed or recommended OTC medications.  Patient verbalized understanding of and agreement with management plan.     Return in about 3 months (around 7/15/2025), or if symptoms worsen or fail to improve.      Transcribed from ambient dictation for MACY Birmingham by MACY Birmingham.  04/15/25   16:17 EDT    Patient or patient representative verbalized consent for the use of Ambient Listening during the visit with  MACY Birmingham for chart documentation. 4/21/2025  10:37 EDT

## 2025-04-17 ENCOUNTER — PRIOR AUTHORIZATION (OUTPATIENT)
Dept: INTERNAL MEDICINE | Facility: CLINIC | Age: 45
End: 2025-04-17
Payer: COMMERCIAL

## 2025-04-22 ENCOUNTER — TELEPHONE (OUTPATIENT)
Dept: INTERNAL MEDICINE | Age: 45
End: 2025-04-22

## 2025-04-22 NOTE — TELEPHONE ENCOUNTER
Caller: BarnesAlena    Relationship: Self    Best call back number: [phone]     What is the medical concern/diagnosis: WEIGHT MANAGEMENT    What specialty or service is being requested: WEIGHT LOSS CLINIC    What is the provider, practice or medical service name: AS CHOSEN BY PROVIDER

## 2025-04-29 ENCOUNTER — OFFICE VISIT (OUTPATIENT)
Dept: NEUROSURGERY | Facility: CLINIC | Age: 45
End: 2025-04-29
Payer: COMMERCIAL

## 2025-04-29 VITALS — BODY MASS INDEX: 35.47 KG/M2 | HEIGHT: 67 IN | TEMPERATURE: 97.8 F | WEIGHT: 226 LBS

## 2025-04-29 DIAGNOSIS — M50.30 DEGENERATION OF CERVICAL INTERVERTEBRAL DISC: Primary | ICD-10-CM

## 2025-04-29 RX ORDER — CHLORAL HYDRATE 500 MG
CAPSULE ORAL
COMMUNITY

## 2025-04-29 NOTE — PROGRESS NOTES
"NEUROSURGERY PROGRESS NOTE    Patient: April Nickie Barnes  : 1980    Primary Care Provider: Alexa Hirsch APRN    Chief Complaint: Cervical radiculopathy    Subjective: This is a 45-year-old female who I previously evaluated for neck and arm pain due to disc herniation at C5-6.  At her last appointment, we prescribed her physical therapy and I referred her for an epidural injection.  She states that the physical therapy was making her pain worse and she has stopped going.  She did get an epidural injection approximately 1 month ago and has noticed significant improvement in her symptoms.  Currently she is not having any significant neck pain.  She does feel a mild amount of pain in her arms at the end of the day, but overall it is much improved.  She denies any new or worsening upper extremity weakness.    Objective    Vital Signs: Temperature 97.8 °F (36.6 °C), temperature source Infrared, height 170.2 cm (67\"), weight 103 kg (226 lb), not currently breastfeeding.    Physical Exam  Awake, alert and oriented x 3  Opens eyes spont  Pupils 3 mm rx bilat  Extraocular muscles intact bilaterally  Face symmetric bilaterally  Tongue midline  5/5 in all 4 ext  No Dulce's bilaterally    Current Medications:   Current Outpatient Medications:     Ascorbic Acid (VITAMIN C GUMMIES PO), Take  by mouth., Disp: , Rfl:     busPIRone (BUSPAR) 5 MG tablet, TAKE ONE TO TWO TABLETS BY MOUTH THREE TIMES A DAY AS NEEDED FOR ANXIETY (Patient taking differently: 1 tablet 2 (Two) Times a Day As Needed. TAKE ONE TO TWO TABLETS BY MOUTH THREE TIMES A DAY AS NEEDED FOR ANXIETY), Disp: 90 tablet, Rfl: 2    cyclobenzaprine (FLEXERIL) 5 MG tablet, Take 1 tablet by mouth 2 (Two) Times a Day As Needed for Muscle Spasms., Disp: 60 tablet, Rfl: 2    famotidine (PEPCID) 20 MG tablet, Take 1 tablet by mouth twice daily OR take 2 tablets by mouth once daily as needed for heartburn, Disp: 60 tablet, Rfl: 5    gabapentin " (NEURONTIN) 300 MG capsule, Take 1 capsule by mouth 3 (Three) Times a Day As Needed (back and neck pain)., Disp: 90 capsule, Rfl: 2    Multiple Vitamins-Minerals (WOMENS MULTIVITAMIN + COLLAGEN PO), Take  by mouth., Disp: , Rfl:     multivitamin (Multiple Vitamin) tablet tablet, Take 1 tablet by mouth Daily., Disp: 30 tablet, Rfl: 11    Omega-3 Fatty Acids (fish oil) 1000 MG capsule capsule, Take  by mouth Daily With Breakfast., Disp: , Rfl:     omeprazole (priLOSEC) 40 MG capsule, Take 1 capsule by mouth Daily., Disp: 30 capsule, Rfl: 5    ondansetron ODT (ZOFRAN-ODT) 8 MG disintegrating tablet, Take 1/2 to 1 tablet by mouth (dissolve under tongue or swallow) every 8 hours as needed for nausea., Disp: 30 tablet, Rfl: 1    SUMAtriptan (Imitrex) 100 MG tablet, Take one tablet at onset of headache. May repeat dose one time in 2 hours if headache not relieved., Disp: 9 tablet, Rfl: 1    tolmetin (TOLECTIN) 400 MG capsule, Take 1 capsule by mouth 3 (Three) Times a Day As Needed (neck and joint pain)., Disp: 90 capsule, Rfl: 1    traMADol (ULTRAM) 50 MG tablet, Take 1 tablet by mouth 3 (Three) Times a Day As Needed for Moderate Pain or Severe Pain. moderate pain, Disp: 90 tablet, Rfl: 2    traZODone (DESYREL) 100 MG tablet, TAKE ONE TO TWO TABLETS BY MOUTH NIGHTLY ONE HOUR BEFORE BEDTIME AS NEEDED FOR SLEEP, Disp: 60 tablet, Rfl: 3    Vitamin D-Vitamin K (D3 + K2) 125-100 MCG capsule, Take  by mouth., Disp: , Rfl:     vitamin E 200 UNIT capsule, Take 1 capsule by mouth Daily., Disp: , Rfl:     buPROPion XL (Wellbutrin XL) 150 MG 24 hr tablet, Take 1 tablet by mouth Daily. (Patient not taking: Reported on 4/29/2025), Disp: 30 tablet, Rfl: 2    ubrogepant (Ubrelvy) 100 MG tablet, Take one tablet by mouth at onset of headache. Can repeat in 2 hours as needed. Do not exceed 2 tablets in one day. (Patient not taking: Reported on 4/29/2025), Disp: 2 tablet, Rfl: 0     Laboratory Results:                              Brief  Urine Lab Results  (Last result in the past 365 days)        Color   Clarity   Blood   Leuk Est   Nitrite   Protein   CREAT   Urine HCG        01/21/25 0040               Negative             Microbiology Results (last 10 days)       ** No results found for the last 240 hours. **            Diagnostic Imaging: I reviewed and independently interpreted the new imaging.     Assessment/Plan:  This is a 45-year-old female who I previously evaluated for neck and arm pain due to a disc herniation at C5-6.  Fortunately, the patient has noted significant improvement after getting an epidural injection over 1 month ago.  She did not find physical therapy helpful and has stopped going.  At this point, because she is not having any significant symptoms, I would favor conservative management.  I told the patient she can continue to get injections if she needs, but if her symptoms were to worsen or if injections were no longer working, I do think she would benefit from a C5-6 ACDF.  We are not going to schedule follow-up at this time, but I told the patient to call if her symptoms worsen and she wants to discuss surgery in more detail.    Diagnoses and all orders for this visit:    1. Degeneration of cervical intervertebral disc (Primary)      April Nickie Barnes  reports that she quit smoking about 5 years ago. Her smoking use included cigarettes. She started smoking about 20 years ago. She has a 7.5 pack-year smoking history. She has been exposed to tobacco smoke. She has never used smokeless tobacco.         Dwain Harris MD  04/29/25  14:35 EDT

## 2025-04-29 NOTE — TELEPHONE ENCOUNTER
Caller: CameronAlena    Relationship: Self    Best call back number: [phone]    What was the call regarding: PATIENT IS CALLING TO CHECK STATUS OF REFERRAL - REQUESTING A CALL BACK

## 2025-05-02 ENCOUNTER — TELEPHONE (OUTPATIENT)
Dept: INTERNAL MEDICINE | Age: 45
End: 2025-05-02
Payer: COMMERCIAL

## 2025-05-02 NOTE — TELEPHONE ENCOUNTER
Pt called to check on the status of a referral for weight loss and I told pt that she was referred to a weight loss program in December of last year and gave her the information to call and schedule an appointment with them.

## 2025-05-05 ENCOUNTER — OFFICE VISIT (OUTPATIENT)
Dept: OBSTETRICS AND GYNECOLOGY | Facility: CLINIC | Age: 45
End: 2025-05-05
Payer: COMMERCIAL

## 2025-05-05 VITALS
WEIGHT: 227.4 LBS | DIASTOLIC BLOOD PRESSURE: 84 MMHG | HEIGHT: 67 IN | SYSTOLIC BLOOD PRESSURE: 126 MMHG | BODY MASS INDEX: 35.69 KG/M2

## 2025-05-05 DIAGNOSIS — N94.6 DYSMENORRHEA: ICD-10-CM

## 2025-05-05 DIAGNOSIS — N93.9 ABNORMAL UTERINE BLEEDING (AUB): Primary | ICD-10-CM

## 2025-05-05 DIAGNOSIS — N92.6 IRREGULAR MENSES: ICD-10-CM

## 2025-05-05 PROCEDURE — 1160F RVW MEDS BY RX/DR IN RCRD: CPT | Performed by: OBSTETRICS & GYNECOLOGY

## 2025-05-05 PROCEDURE — 99213 OFFICE O/P EST LOW 20 MIN: CPT | Performed by: OBSTETRICS & GYNECOLOGY

## 2025-05-05 PROCEDURE — 1159F MED LIST DOCD IN RCRD: CPT | Performed by: OBSTETRICS & GYNECOLOGY

## 2025-05-05 NOTE — PROGRESS NOTES
Chief Complaint   Patient presents with    Follow-up     USG/AUB         Subjective   HPI  April Nickie Branes is a 45 y.o. female, , No LMP recorded (lmp unknown). Patient is perimenopausal.. She presents for initial evaluation of irregular periods. She reports her cycles occur every 1-4 months, lasting 5-7 days. The flow is moderate to heavy.. The menstrual problem began  7 months ago. She states that periods were regular up until this past October. She is unsure if weight could be contributing to irregular periods or not. Patient also reports irregular spotting. Prior to today's visit, the patient has not been evaluated. She has been on birth control many years ago for dysmenorrhea, she was unable to tolerate birth control.  The patient reports additional symptoms as none.      She has had breast cancer twice in the past 4 years.   States can't do progesterone b/c of it.    Taking Flexeril, tomentin for periods.  States h/o endometriosis.    LMP Feb but very very heavy.  Currently light bleeding/spotting x 3wk.    US done today: Yes.  Findings showed submucosal and intramural fibroids, right ovarian complex cyst (appears to be resolving follicle).  I have personally evaluated the U/S and agree with the findings. Alley Jim MD      Thromboembolic Disease: none  History of hypertension: Patient--  History of migraines: yes without aura  Tobacco Usage?: No     Additional OB/GYN History   Last Pap :   Last Completed Pap Smear            Upcoming       PAP SMEAR (Every 3 Years) Next due on 3/10/2028      03/10/2025  LIQUID-BASED PAP SMEAR WITH HPV GENOTYPING IF ASCUS ([facility])    10/24/2022  Done    2021  SCANNED - PAP SMEAR    2015  Done                              Current Outpatient Medications:     Ascorbic Acid (VITAMIN C GUMMIES PO), Take  by mouth., Disp: , Rfl:     buPROPion XL (Wellbutrin XL) 150 MG 24 hr tablet, Take 1 tablet by mouth Daily. (Patient not  taking: Reported on 4/29/2025), Disp: 30 tablet, Rfl: 2    busPIRone (BUSPAR) 5 MG tablet, TAKE ONE TO TWO TABLETS BY MOUTH THREE TIMES A DAY AS NEEDED FOR ANXIETY (Patient taking differently: 1 tablet 2 (Two) Times a Day As Needed. TAKE ONE TO TWO TABLETS BY MOUTH THREE TIMES A DAY AS NEEDED FOR ANXIETY), Disp: 90 tablet, Rfl: 2    cyclobenzaprine (FLEXERIL) 5 MG tablet, Take 1 tablet by mouth 2 (Two) Times a Day As Needed for Muscle Spasms., Disp: 60 tablet, Rfl: 2    famotidine (PEPCID) 20 MG tablet, Take 1 tablet by mouth twice daily OR take 2 tablets by mouth once daily as needed for heartburn, Disp: 60 tablet, Rfl: 5    gabapentin (NEURONTIN) 300 MG capsule, Take 1 capsule by mouth 3 (Three) Times a Day As Needed (back and neck pain)., Disp: 90 capsule, Rfl: 2    Multiple Vitamins-Minerals (WOMENS MULTIVITAMIN + COLLAGEN PO), Take  by mouth., Disp: , Rfl:     multivitamin (Multiple Vitamin) tablet tablet, Take 1 tablet by mouth Daily., Disp: 30 tablet, Rfl: 11    Omega-3 Fatty Acids (fish oil) 1000 MG capsule capsule, Take  by mouth Daily With Breakfast., Disp: , Rfl:     omeprazole (priLOSEC) 40 MG capsule, Take 1 capsule by mouth Daily., Disp: 30 capsule, Rfl: 5    ondansetron ODT (ZOFRAN-ODT) 8 MG disintegrating tablet, Take 1/2 to 1 tablet by mouth (dissolve under tongue or swallow) every 8 hours as needed for nausea., Disp: 30 tablet, Rfl: 1    SUMAtriptan (Imitrex) 100 MG tablet, Take one tablet at onset of headache. May repeat dose one time in 2 hours if headache not relieved., Disp: 9 tablet, Rfl: 1    tolmetin (TOLECTIN) 400 MG capsule, Take 1 capsule by mouth 3 (Three) Times a Day As Needed (neck and joint pain)., Disp: 90 capsule, Rfl: 1    traMADol (ULTRAM) 50 MG tablet, Take 1 tablet by mouth 3 (Three) Times a Day As Needed for Moderate Pain or Severe Pain. moderate pain, Disp: 90 tablet, Rfl: 2    traZODone (DESYREL) 100 MG tablet, TAKE ONE TO TWO TABLETS BY MOUTH NIGHTLY ONE HOUR BEFORE BEDTIME  "AS NEEDED FOR SLEEP, Disp: 60 tablet, Rfl: 3    Vitamin D-Vitamin K (D3 + K2) 125-100 MCG capsule, Take  by mouth., Disp: , Rfl:     vitamin E 200 UNIT capsule, Take 1 capsule by mouth Daily., Disp: , Rfl:      Past Medical History:   Diagnosis Date    Backache     Blood in stool     Cervical disc disorder     Endometriosis     Gastritis     Hemorrhoids     Hx of radiation therapy     Ingrown nail of great toe of left foot     Malignant neoplasm of upper-outer quadrant of right breast in female, estrogen receptor positive 2021    Tinea         Past Surgical History:   Procedure Laterality Date    BREAST LUMPECTOMY Right 2022    Rt Breast Lumpectomy - 21     SECTION      OTHER SURGICAL HISTORY      treatment of lower leg fracture         The additional following portions of the patient's history were reviewed and updated as appropriate: allergies, current medications, past family history, past medical history, past social history, past surgical history, and problem list.    Review of Systems   Constitutional: Negative.    HENT: Negative.     Eyes: Negative.    Respiratory: Negative.     Cardiovascular: Negative.    Gastrointestinal: Negative.    Endocrine: Negative.    Genitourinary:  Positive for menstrual problem.   Musculoskeletal: Negative.    Skin: Negative.    Allergic/Immunologic: Negative.    Neurological: Negative.    Hematological: Negative.    Psychiatric/Behavioral: Negative.         I have reviewed and agree with the HPI, ROS, and historical information as entered above. Alley Jim MD      Objective   /84   Ht 170.2 cm (67\")   Wt 103 kg (227 lb 6.4 oz)   LMP  (LMP Unknown)   BMI 35.62 kg/m²     Physical Exam  Physical Exam:  General:  well developed; well nourished  no acute distress  mentation appropriate  obese - Body mass index is 35.62 kg/m².   Abdomen: soft, non-tender; no masses  no umbilical or inguinal hernias are present   Pelvis: Not performed.    "   Assessment & Plan     Assessment     Problem List Items Addressed This Visit       Irregular menses    Relevant Orders    CBC (No Diff)    Follicle Stimulating Hormone    Estradiol    Luteinizing Hormone    Abnormal uterine bleeding (AUB) - Primary    Dysmenorrhea         Plan     Return to office for Endometrial Biopsy  Discussed potential etiologies and treatment options.       Alley Jim MD  05/05/2025

## 2025-05-06 LAB
ERYTHROCYTE [DISTWIDTH] IN BLOOD BY AUTOMATED COUNT: 12.2 % (ref 12.3–15.4)
ESTRADIOL SERPL-MCNC: 88.3 PG/ML
FSH SERPL-ACNC: 10.5 MIU/ML
HCT VFR BLD AUTO: 38.5 % (ref 34–46.6)
HGB BLD-MCNC: 12.5 G/DL (ref 12–15.9)
LH SERPL-ACNC: 9.7 MIU/ML
MCH RBC QN AUTO: 30 PG (ref 26.6–33)
MCHC RBC AUTO-ENTMCNC: 32.5 G/DL (ref 31.5–35.7)
MCV RBC AUTO: 92.3 FL (ref 79–97)
PLATELET # BLD AUTO: 219 10*3/MM3 (ref 140–450)
RBC # BLD AUTO: 4.17 10*6/MM3 (ref 3.77–5.28)
WBC # BLD AUTO: 5.44 10*3/MM3 (ref 3.4–10.8)

## 2025-05-09 NOTE — PROGRESS NOTES
CONSULTATION NOTE    NAME:      Alena Barnes  :                                                          1980  DATE OF CONSULTATION:                       21  REQUESTING PHYSICIAN:                   Honorio Noguera MD  REASON FOR CONSULTATION:           Cancer Staging  Malignant neoplasm of upper-outer quadrant of right breast in female, estrogen receptor positive (CMS/HCC)  Staging form: Breast, AJCC 8th Edition  - Clinical stage from 2021: cT1c - Unsigned  - Pathologic stage from 2021: Stage IA (pT1c, pN0(sn), cM0, G1, ER+, TX+, HER2-) - Signed by Ioana Best MD on 2021         BRIEF HISTORY:  Alena Barnes  is a very pleasant 41 y.o. female  who was noted to have an asymmetry in the posterior right upper outer quadrant of the breast on screening mammogram.  Diagnostic mammogram revealed a 1.8 x 1.3 x 1.2 cm hypoechoic irregular mass at the 10 o'clock position 10 cm from the nipple.  Ultrasound-guided biopsy demonstrated invasive ductal adenocarcinoma at least 8 mm in maximal dimension.  [place] score 2/3, ER/TX positive HER-2 nu negative.  MRI showed the biopsy-proven invasive ductal carcinoma in the right upper outer quadrant measuring 1.8 cm.  There was no evidence of multifocal/multicentric disease in the right breast.  The left breast was benign.  There is no evidence of lymphadenopathy.  2021 she underwent right breast lumpectomy and sentinel node sampling.  Final pathology revealed invasive ductal carcinoma 14 mm in size.  There was less than 1 mm to the medial margin, lateral and posterior margins.  0/1 sentinel node was positive for tumor.  Extended margins were negative.  Tumor was ER/TX positive HER-2/megan negative.    Genetic testing was negative.  She has no complaints today and is here to discuss postoperative radiation.      Age at menarche:  8  Age at menopause:  n/a  Hormone replacement therapy:  no  Personal history of breast cancer:   "no  Family history of breast cancer:  no  Radiation to chest before age of 28:  no  Age of first live birth:  34    BMI:  Body mass index is 41.82 kg/m².      Social History     Substance and Sexual Activity   Alcohol Use No       Allergies   Allergen Reactions   • Penicillins        Social History     Tobacco Use   • Smoking status: Former Smoker   • Smokeless tobacco: Never Used   Substance Use Topics   • Alcohol use: No   • Drug use: Never         Past Medical History:   Diagnosis Date   • Backache    • Blood in stool    • Endometriosis    • Gastritis    • Hemorrhoids    • Ingrown nail of great toe of left foot    • Malignant neoplasm of upper-outer quadrant of right breast in female, estrogen receptor positive (CMS/HCC) 2021   • Tinea        family history includes Alcohol abuse in her mother; Cancer in her mother; Fibrocystic breast disease in an other family member; Heart disease in her father; Mental illness in her mother; Osteoporosis in an other family member.     Past Surgical History:   Procedure Laterality Date   •  SECTION     • OTHER SURGICAL HISTORY      treatment of lower leg fracture        Review of Systems   Psychiatric/Behavioral: Positive for depression. The patient is nervous/anxious.    All other systems reviewed and are negative.          Objective   VITAL SIGNS:   Vitals:    21 1219   BP: 160/90   Pulse: 77   Resp: 18   Temp: 97.3 °F (36.3 °C)   Weight: 121 kg (267 lb)   Height: 170.2 cm (67\")   PainSc: 0-No pain        KPS       90%    Physical Exam  Vitals and nursing note reviewed.   Constitutional:       General: She is not in acute distress.     Appearance: She is well-developed. She is obese.   HENT:      Head: Normocephalic and atraumatic.   Eyes:      General: No scleral icterus.  Cardiovascular:      Rate and Rhythm: Normal rate and regular rhythm.   Pulmonary:      Effort: Pulmonary effort is normal.      Breath sounds: Normal breath sounds.   Musculoskeletal:     "  Cervical back: Neck supple.   Lymphadenopathy:      Cervical: No cervical adenopathy.   Neurological:      Mental Status: She is alert and oriented to person, place, and time.     The breast exam reveals Ms. Thrasher has very large breasts.  The left breast has no masses or suspicious lesions.  The right breast has a well-healing axillary incision at approximately the 10-3 o'clock region away from the nipple areolar complex and a well-healing incision in the right axilla.  Both areas have mild ecchymosis.  She has no axillary adenopathy bilaterally.         The following portions of the patient's history were reviewed and updated as appropriate: allergies, current medications, past family history, past medical history, past social history, past surgical history and problem list.    Assessment      IMPRESSION:    April Nickie Barnes  is a 41 y.o. female  who was noted to have an asymmetry in the posterior right upper outer quadrant of the breast on screening mammogram.  Diagnostic mammogram revealed a 1.8 x 1.3 x 1.2 cm hypoechoic irregular mass at the 10 o'clock position 10 cm from the nipple.  Ultrasound-guided biopsy demonstrated invasive ductal adenocarcinoma at least 8 mm in maximal dimension.  [place] score 2/3, ER/AL positive HER-2 nu negative.  MRI showed the biopsy-proven invasive ductal carcinoma in the right upper outer quadrant measuring 1.8 cm.  There was no evidence of multifocal/multicentric disease in the right breast.  The left breast was benign.  There is no evidence of lymphadenopathy.  5/28/2021 she underwent right breast lumpectomy and sentinel node sampling *.                            Genetic testing was negative.          RECOMMENDATIONS:    I recommend postoperative radiotherapy to complete her breast conserving treatment.  The pros and cons, risks and benefits were reviewed with Ms. Barnes and her aunt.  She will see Dr. Linda Anderson this afternoon to discuss chemotherapy as well as  hormonal blockade.  She knows to call my nurse Shara when she is ready to proceed with radiation.  She has very large breasts so I anticipate 45 Decker in 25 fractions and a tumor bed boost of 10 Gray in 5 fractions.  He very much for letting me perspective care of this very pleasant patient.             Ioana Best MD      Errors in dictation may reflect use of voice recognition software and not all errors in transcription may have been detected prior to signing.   Pt presented to the ED with chest pain. Pt had cardiac workup and placed into OBS for nuclear stress test. Pt became agitated while waiting for the exam and decided to leave AMA.  I had extensive discussion of risks and benefits of pursuing further medical evaluation and/or care with patient and any available family/friends; patient still electing to leave against medical advice. Patient is awake, alert, oriented and demonstrates full capacity and insight into illness. Patient aware and encouraged to return immediately to ED or nearest ED if patient decides to change mind regarding care or if patient experiences any new, worsening, or concerning symptoms. Pt presented to the ED with chest pain. Pt had cardiac workup and placed into OBS for nuclear stress test. Pt became agitated while waiting for the exam and decided to leave AMA.  I had extensive discussion of risks and benefits of pursuing further medical evaluation and/or care with patient and any available family/friends; patient still electing to leave against medical advice. Patient is awake, alert, oriented and demonstrates full capacity and insight into illness. Patient aware and encouraged to return immediately to ED or nearest ED if patient decides to change mind regarding care or if patient experiences any new, worsening, or concerning symptoms.    Mayur: 37-year-old male presents to the ED for multiple complaints.  Seen by the initial team for dizziness, vague imbalance, vertigo, blurry vision, right hand pain.  All the symptoms have been ongoing for multiple months.  Additionally patient reports right hand pain has gotten worse.  Sustained an injury recently and was splinted but he remove the splint.  Never had chest follow-up.  Case was signed out to me pending evaluation for his chest pain and discomfort for nuclear stress test.  Patient was evaluated by me.  Given additional Percocet to alleviate hand pain.  Hand exam revealed 5 out of 5  strength with sensation intact.  Distal pulses present.  Mild tenderness to palpation along the thenar aspect.  Labs reviewed by me troponin negative x 2.  CT angio of the head and neck unremarkable.  Pending nuclear stress.  On reevaluation patient reports he does not want to wait for the nuclear stress test.  Patient was advised of the risks involved in leaving without further cardiac testing.  Patient had capacity to understand risks explained.  AMA form signed.  Discharged with return precautions given.  Given follow-up with hand surgery to evaluate for persistent and chronic pain.  Patient understood plan.

## 2025-05-13 ENCOUNTER — OFFICE VISIT (OUTPATIENT)
Dept: PSYCHIATRY | Facility: CLINIC | Age: 45
End: 2025-05-13
Payer: COMMERCIAL

## 2025-05-13 DIAGNOSIS — G47.9 SLEEP DISTURBANCE: ICD-10-CM

## 2025-05-13 DIAGNOSIS — F43.0 STRESS REACTION: Primary | ICD-10-CM

## 2025-05-13 PROCEDURE — 1160F RVW MEDS BY RX/DR IN RCRD: CPT | Performed by: NURSE PRACTITIONER

## 2025-05-13 PROCEDURE — 1159F MED LIST DOCD IN RCRD: CPT | Performed by: NURSE PRACTITIONER

## 2025-05-13 PROCEDURE — 90792 PSYCH DIAG EVAL W/MED SRVCS: CPT | Performed by: NURSE PRACTITIONER
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
## 2025-05-23 ENCOUNTER — LAB (OUTPATIENT)
Dept: INTERNAL MEDICINE | Age: 45
End: 2025-05-23
Payer: COMMERCIAL

## 2025-05-23 ENCOUNTER — OFFICE VISIT (OUTPATIENT)
Dept: INTERNAL MEDICINE | Age: 45
End: 2025-05-23
Payer: COMMERCIAL

## 2025-05-23 VITALS
SYSTOLIC BLOOD PRESSURE: 136 MMHG | BODY MASS INDEX: 35.12 KG/M2 | HEART RATE: 93 BPM | WEIGHT: 223.8 LBS | TEMPERATURE: 97.6 F | OXYGEN SATURATION: 97 % | DIASTOLIC BLOOD PRESSURE: 84 MMHG | HEIGHT: 67 IN

## 2025-05-23 DIAGNOSIS — Z13.0 SCREENING FOR BLOOD DISEASE: ICD-10-CM

## 2025-05-23 DIAGNOSIS — Z13.220 LIPID SCREENING: ICD-10-CM

## 2025-05-23 DIAGNOSIS — Z00.00 HEALTHCARE MAINTENANCE: Primary | ICD-10-CM

## 2025-05-23 DIAGNOSIS — R63.5 WEIGHT GAIN: ICD-10-CM

## 2025-05-23 DIAGNOSIS — Z13.21 ENCOUNTER FOR VITAMIN DEFICIENCY SCREENING: ICD-10-CM

## 2025-05-23 DIAGNOSIS — F50.00 ANOREXIA NERVOSA: ICD-10-CM

## 2025-05-23 DIAGNOSIS — E66.812 CLASS 2 SEVERE OBESITY WITH SERIOUS COMORBIDITY AND BODY MASS INDEX (BMI) OF 35.0 TO 35.9 IN ADULT, UNSPECIFIED OBESITY TYPE: Primary | ICD-10-CM

## 2025-05-23 DIAGNOSIS — E66.01 CLASS 2 SEVERE OBESITY WITH SERIOUS COMORBIDITY AND BODY MASS INDEX (BMI) OF 35.0 TO 35.9 IN ADULT, UNSPECIFIED OBESITY TYPE: Primary | ICD-10-CM

## 2025-05-23 DIAGNOSIS — E55.9 VITAMIN D DEFICIENCY: ICD-10-CM

## 2025-05-23 DIAGNOSIS — Z13.1 SCREENING FOR DIABETES MELLITUS: ICD-10-CM

## 2025-05-23 DIAGNOSIS — Z13.29 THYROID DISORDER SCREENING: ICD-10-CM

## 2025-05-23 LAB
25(OH)D3 SERPL-MCNC: 50.4 NG/ML (ref 30–100)
ALBUMIN SERPL-MCNC: 4.5 G/DL (ref 3.5–5.2)
ALBUMIN/GLOB SERPL: 1.6 G/DL
ALP SERPL-CCNC: 89 U/L (ref 39–117)
ALT SERPL W P-5'-P-CCNC: 19 U/L (ref 1–33)
ANION GAP SERPL CALCULATED.3IONS-SCNC: 12 MMOL/L (ref 5–15)
AST SERPL-CCNC: 26 U/L (ref 1–32)
BASOPHILS # BLD AUTO: 0.03 10*3/MM3 (ref 0–0.2)
BASOPHILS NFR BLD AUTO: 0.6 % (ref 0–1.5)
BILIRUB SERPL-MCNC: 0.3 MG/DL (ref 0–1.2)
BUN SERPL-MCNC: 18 MG/DL (ref 6–20)
BUN/CREAT SERPL: 29.5 (ref 7–25)
CALCIUM SPEC-SCNC: 9.7 MG/DL (ref 8.6–10.5)
CHLORIDE SERPL-SCNC: 104 MMOL/L (ref 98–107)
CHOLEST SERPL-MCNC: 207 MG/DL (ref 0–200)
CO2 SERPL-SCNC: 23 MMOL/L (ref 22–29)
CORTIS AM PEAK SERPL-MCNC: 3.9 MCG/DL
CREAT SERPL-MCNC: 0.61 MG/DL (ref 0.57–1)
DEPRECATED RDW RBC AUTO: 43 FL (ref 37–54)
EGFRCR SERPLBLD CKD-EPI 2021: 111.1 ML/MIN/1.73
EOSINOPHIL # BLD AUTO: 0.06 10*3/MM3 (ref 0–0.4)
EOSINOPHIL NFR BLD AUTO: 1.2 % (ref 0.3–6.2)
ERYTHROCYTE [DISTWIDTH] IN BLOOD BY AUTOMATED COUNT: 12.9 % (ref 12.3–15.4)
FOLATE SERPL-MCNC: 8.47 NG/ML (ref 4.78–24.2)
GLOBULIN UR ELPH-MCNC: 2.9 GM/DL
GLUCOSE SERPL-MCNC: 84 MG/DL (ref 65–99)
HBA1C MFR BLD: 5.1 % (ref 4.8–5.6)
HCT VFR BLD AUTO: 40.3 % (ref 34–46.6)
HDLC SERPL-MCNC: 66 MG/DL (ref 40–60)
HGB BLD-MCNC: 13.8 G/DL (ref 12–15.9)
IMM GRANULOCYTES # BLD AUTO: 0.02 10*3/MM3 (ref 0–0.05)
IMM GRANULOCYTES NFR BLD AUTO: 0.4 % (ref 0–0.5)
LDLC SERPL CALC-MCNC: 123 MG/DL (ref 0–100)
LDLC/HDLC SERPL: 1.83 {RATIO}
LYMPHOCYTES # BLD AUTO: 1.58 10*3/MM3 (ref 0.7–3.1)
LYMPHOCYTES NFR BLD AUTO: 30.6 % (ref 19.6–45.3)
MCH RBC QN AUTO: 31.4 PG (ref 26.6–33)
MCHC RBC AUTO-ENTMCNC: 34.2 G/DL (ref 31.5–35.7)
MCV RBC AUTO: 91.8 FL (ref 79–97)
MONOCYTES # BLD AUTO: 0.39 10*3/MM3 (ref 0.1–0.9)
MONOCYTES NFR BLD AUTO: 7.6 % (ref 5–12)
NEUTROPHILS NFR BLD AUTO: 3.08 10*3/MM3 (ref 1.7–7)
NEUTROPHILS NFR BLD AUTO: 59.6 % (ref 42.7–76)
PLATELET # BLD AUTO: 178 10*3/MM3 (ref 140–450)
PMV BLD AUTO: 12.7 FL (ref 6–12)
POTASSIUM SERPL-SCNC: 4.4 MMOL/L (ref 3.5–5.2)
PROT SERPL-MCNC: 7.4 G/DL (ref 6–8.5)
RBC # BLD AUTO: 4.39 10*6/MM3 (ref 3.77–5.28)
SODIUM SERPL-SCNC: 139 MMOL/L (ref 136–145)
T3FREE SERPL-MCNC: 2.73 PG/ML (ref 2–4.4)
T4 FREE SERPL-MCNC: 1.21 NG/DL (ref 0.92–1.68)
TRIGL SERPL-MCNC: 101 MG/DL (ref 0–150)
TSH SERPL DL<=0.05 MIU/L-ACNC: 2.66 UIU/ML (ref 0.27–4.2)
VIT B12 BLD-MCNC: 642 PG/ML (ref 211–946)
VLDLC SERPL-MCNC: 18 MG/DL (ref 5–40)
WBC NRBC COR # BLD AUTO: 5.16 10*3/MM3 (ref 3.4–10.8)

## 2025-05-23 PROCEDURE — 84481 FREE ASSAY (FT-3): CPT | Performed by: NURSE PRACTITIONER

## 2025-05-23 PROCEDURE — 80050 GENERAL HEALTH PANEL: CPT | Performed by: NURSE PRACTITIONER

## 2025-05-23 PROCEDURE — 83520 IMMUNOASSAY QUANT NOS NONAB: CPT | Performed by: NURSE PRACTITIONER

## 2025-05-23 PROCEDURE — 82533 TOTAL CORTISOL: CPT | Performed by: NURSE PRACTITIONER

## 2025-05-23 PROCEDURE — 80061 LIPID PANEL: CPT | Performed by: NURSE PRACTITIONER

## 2025-05-23 PROCEDURE — 86376 MICROSOMAL ANTIBODY EACH: CPT | Performed by: NURSE PRACTITIONER

## 2025-05-23 PROCEDURE — 84439 ASSAY OF FREE THYROXINE: CPT | Performed by: NURSE PRACTITIONER

## 2025-05-23 PROCEDURE — 83036 HEMOGLOBIN GLYCOSYLATED A1C: CPT | Performed by: NURSE PRACTITIONER

## 2025-05-23 PROCEDURE — 86800 THYROGLOBULIN ANTIBODY: CPT | Performed by: NURSE PRACTITIONER

## 2025-05-23 PROCEDURE — 82306 VITAMIN D 25 HYDROXY: CPT | Performed by: NURSE PRACTITIONER

## 2025-05-23 PROCEDURE — 82746 ASSAY OF FOLIC ACID SERUM: CPT | Performed by: NURSE PRACTITIONER

## 2025-05-23 PROCEDURE — 84445 ASSAY OF TSI GLOBULIN: CPT | Performed by: NURSE PRACTITIONER

## 2025-05-23 PROCEDURE — 36415 COLL VENOUS BLD VENIPUNCTURE: CPT | Performed by: NURSE PRACTITIONER

## 2025-05-23 PROCEDURE — 82607 VITAMIN B-12: CPT | Performed by: NURSE PRACTITIONER

## 2025-05-23 NOTE — PROGRESS NOTES
Subjective   Chief Complaint   Patient presents with    Med discussion     Weight Management        April Nickie Barnes is a 45 y.o. female.    History of Present Illness  The patient presents for weight management.    She reports significant weight gain despite maintaining a consistent lifestyle. Initially, she adhered to a regimen of fasting 4 days a week and consuming small meals on the remaining 3 days, resulting in a weight loss of 100 pounds. However, around October or November, she began to gain weight intermittently, approximately 2 to 3 pounds at a time. By January, she had gained an additional 20 pounds, coinciding with her decision to cease fasting. From January to April, she consumed one small meal daily, leading to a further weight gain of 30 pounds. In mid-April, she resumed fasting and scheduled an appointment with a weight loss clinic for 06/18/2025. Over the past 1.5 months, her diet has been limited to a handful of nuts, 2 protein shakes daily, and occasional hard-boiled eggs. She will go days at a time fasting. Drinks occasional protein shake. Despite this, she has gained 4 pounds.     She is currently taking RSO oil for cancer and has been advised against controlled substances due to this treatment. She is considering Ozempic but is apprehensive about potential side effects. She is unable to take hormones due to her cancer history. Her weight has fluctuated from 185 pounds to 223 pounds, with a brief period at 228 pounds. She has attempted to maintain a healthy diet, avoiding sugar, fat, and fried foods, but continues to struggle with weight gain. She is considering increasing her physical activity while continuing her fasting regimen. She has been experiencing memory issues, which she attributes to hormonal changes. She is also contemplating the use of resveratrol supplements for their anti-aging properties.    She has been diagnosed with early-stage perimenopause and has been prescribed  thyroid medication, which she has chosen not to take.    I have reviewed the following portions of the patient's history and confirmed they are accurate: allergies, current medications, past family history, past medical history, past social history, past surgical history, and problem list    Review of Systems  Pertinent items are noted in HPI.     Current Outpatient Medications on File Prior to Visit   Medication Sig    Ascorbic Acid (VITAMIN C GUMMIES PO) Take  by mouth.    cyclobenzaprine (FLEXERIL) 5 MG tablet Take 1 tablet by mouth 2 (Two) Times a Day As Needed for Muscle Spasms.    famotidine (PEPCID) 20 MG tablet Take 1 tablet by mouth twice daily OR take 2 tablets by mouth once daily as needed for heartburn    gabapentin (NEURONTIN) 300 MG capsule Take 1 capsule by mouth 3 (Three) Times a Day As Needed (back and neck pain).    Multiple Vitamins-Minerals (WOMENS MULTIVITAMIN + COLLAGEN PO) Take  by mouth.    multivitamin (Multiple Vitamin) tablet tablet Take 1 tablet by mouth Daily.    Omega-3 Fatty Acids (fish oil) 1000 MG capsule capsule Take  by mouth Daily With Breakfast.    omeprazole (priLOSEC) 40 MG capsule Take 1 capsule by mouth Daily.    ondansetron ODT (ZOFRAN-ODT) 8 MG disintegrating tablet Take 1/2 to 1 tablet by mouth (dissolve under tongue or swallow) every 8 hours as needed for nausea.    SUMAtriptan (Imitrex) 100 MG tablet Take one tablet at onset of headache. May repeat dose one time in 2 hours if headache not relieved.    tolmetin (TOLECTIN) 400 MG capsule Take 1 capsule by mouth 3 (Three) Times a Day As Needed (neck and joint pain).    traMADol (ULTRAM) 50 MG tablet Take 1 tablet by mouth 3 (Three) Times a Day As Needed for Moderate Pain or Severe Pain. moderate pain    traZODone (DESYREL) 100 MG tablet TAKE ONE TO TWO TABLETS BY MOUTH NIGHTLY ONE HOUR BEFORE BEDTIME AS NEEDED FOR SLEEP    Vitamin D-Vitamin K (D3 + K2) 125-100 MCG capsule Take  by mouth.    vitamin E 200 UNIT capsule Take 1  "capsule by mouth Daily. (Patient not taking: Reported on 5/23/2025)     No current facility-administered medications on file prior to visit.       Objective   Vitals:    05/23/25 0957   BP: 136/84   BP Location: Left arm   Patient Position: Sitting   Cuff Size: Large Adult   Pulse: 93   Temp: 97.6 °F (36.4 °C)   SpO2: 97%   Weight: 102 kg (223 lb 12.8 oz)   Height: 170.3 cm (67.05\")     Body mass index is 35 kg/m².    Physical Exam  Vitals reviewed.   Constitutional:       Appearance: Normal appearance. She is well-developed.   HENT:      Head: Normocephalic and atraumatic.      Nose: Nose normal.   Eyes:      General: Lids are normal.      Conjunctiva/sclera: Conjunctivae normal.      Pupils: Pupils are equal, round, and reactive to light.   Neck:      Thyroid: No thyromegaly.      Trachea: Trachea normal.   Cardiovascular:      Rate and Rhythm: Normal rate and regular rhythm.      Heart sounds: Normal heart sounds.   Pulmonary:      Effort: Pulmonary effort is normal. No respiratory distress.      Breath sounds: Normal breath sounds.   Skin:     General: Skin is warm and dry.   Neurological:      Mental Status: She is alert and oriented to person, place, and time.      GCS: GCS eye subscore is 4. GCS verbal subscore is 5. GCS motor subscore is 6.   Psychiatric:         Attention and Perception: Attention normal.         Mood and Affect: Mood is anxious. Affect is tearful.         Speech: Speech normal.         Behavior: Behavior normal. Behavior is cooperative.         Thought Content: Thought content includes suicidal ideation. Thought content does not include homicidal ideation. Thought content does not include homicidal or suicidal plan.         Results      Lab Results   Component Value Date    WBC 5.16 05/23/2025    HGB 13.8 05/23/2025    HCT 40.3 05/23/2025    MCV 91.8 05/23/2025     05/23/2025      Lab Results   Component Value Date    GLUCOSE 84 05/23/2025    BUN 18 05/23/2025    CREATININE 0.61 " 05/23/2025     05/23/2025    K 4.4 05/23/2025     05/23/2025    CALCIUM 9.7 05/23/2025    PROTEINTOT 7.4 05/23/2025    ALBUMIN 4.5 05/23/2025    ALT 19 05/23/2025    AST 26 05/23/2025    ALKPHOS 89 05/23/2025    BILITOT 0.3 05/23/2025    GLOB 2.9 05/23/2025    AGRATIO 1.6 05/23/2025    BCR 29.5 (H) 05/23/2025    ANIONGAP 12.0 05/23/2025    EGFR 111.1 05/23/2025      Lab Results   Component Value Date    HGBA1C 5.10 05/23/2025      Lab Results   Component Value Date    CHOL 207 (H) 05/23/2025    TRIG 101 05/23/2025    HDL 66 (H) 05/23/2025     (H) 05/23/2025      Lab Results   Component Value Date    TSH 2.660 05/23/2025          Assessment & Plan   Problem List Items Addressed This Visit    None  Visit Diagnoses         Class 2 severe obesity with serious comorbidity and body mass index (BMI) of 35.0 to 35.9 in adult, unspecified obesity type    -  Primary    Relevant Orders    T4, Free (Completed)    Thyroid Antibodies (Completed)    T3, Free (Completed)    Thyroid Stimulating Immunoglobulin (Completed)    Thyrotropin Receptor Antibody (Completed)    Ambulatory Referral to Nutrition Services      Weight gain        Relevant Orders    Comprehensive Metabolic Panel (Completed)    T4, Free (Completed)    Thyroid Antibodies (Completed)    T3, Free (Completed)    Thyroid Stimulating Immunoglobulin (Completed)    Thyrotropin Receptor Antibody (Completed)    CBC Auto Differential (Completed)    Cortisol - AM (Completed)    Ambulatory Referral to Nutrition Services      Anorexia nervosa        Relevant Orders    Ambulatory Referral to Nutrition Services      Screening for blood disease        Relevant Orders    Comprehensive Metabolic Panel (Completed)    Lipid Panel (Completed)    Hemoglobin A1c (Completed)    TSH Rfx On Abnormal To Free T4 (Completed)    Vitamin B12 & Folate (Completed)    Vitamin D,25-Hydroxy (Completed)      Lipid screening        Relevant Orders    Lipid Panel (Completed)       Screening for diabetes mellitus        Relevant Orders    Hemoglobin A1c (Completed)      Thyroid disorder screening        Relevant Orders    TSH Rfx On Abnormal To Free T4 (Completed)      Encounter for vitamin deficiency screening        Relevant Orders    Vitamin B12 & Folate (Completed)    Vitamin D,25-Hydroxy (Completed)      Vitamin D deficiency        Relevant Orders    Vitamin D,25-Hydroxy (Completed)               Current Outpatient Medications:     Ascorbic Acid (VITAMIN C GUMMIES PO), Take  by mouth., Disp: , Rfl:     cyclobenzaprine (FLEXERIL) 5 MG tablet, Take 1 tablet by mouth 2 (Two) Times a Day As Needed for Muscle Spasms., Disp: 60 tablet, Rfl: 2    famotidine (PEPCID) 20 MG tablet, Take 1 tablet by mouth twice daily OR take 2 tablets by mouth once daily as needed for heartburn, Disp: 60 tablet, Rfl: 5    gabapentin (NEURONTIN) 300 MG capsule, Take 1 capsule by mouth 3 (Three) Times a Day As Needed (back and neck pain)., Disp: 90 capsule, Rfl: 2    Multiple Vitamins-Minerals (WOMENS MULTIVITAMIN + COLLAGEN PO), Take  by mouth., Disp: , Rfl:     multivitamin (Multiple Vitamin) tablet tablet, Take 1 tablet by mouth Daily., Disp: 30 tablet, Rfl: 11    Omega-3 Fatty Acids (fish oil) 1000 MG capsule capsule, Take  by mouth Daily With Breakfast., Disp: , Rfl:     omeprazole (priLOSEC) 40 MG capsule, Take 1 capsule by mouth Daily., Disp: 30 capsule, Rfl: 5    ondansetron ODT (ZOFRAN-ODT) 8 MG disintegrating tablet, Take 1/2 to 1 tablet by mouth (dissolve under tongue or swallow) every 8 hours as needed for nausea., Disp: 30 tablet, Rfl: 1    SUMAtriptan (Imitrex) 100 MG tablet, Take one tablet at onset of headache. May repeat dose one time in 2 hours if headache not relieved., Disp: 9 tablet, Rfl: 1    tolmetin (TOLECTIN) 400 MG capsule, Take 1 capsule by mouth 3 (Three) Times a Day As Needed (neck and joint pain)., Disp: 90 capsule, Rfl: 1    traMADol (ULTRAM) 50 MG tablet, Take 1 tablet by mouth 3  (Three) Times a Day As Needed for Moderate Pain or Severe Pain. moderate pain, Disp: 90 tablet, Rfl: 2    traZODone (DESYREL) 100 MG tablet, TAKE ONE TO TWO TABLETS BY MOUTH NIGHTLY ONE HOUR BEFORE BEDTIME AS NEEDED FOR SLEEP, Disp: 60 tablet, Rfl: 3    Vitamin D-Vitamin K (D3 + K2) 125-100 MCG capsule, Take  by mouth., Disp: , Rfl:     vitamin E 200 UNIT capsule, Take 1 capsule by mouth Daily. (Patient not taking: Reported on 5/23/2025), Disp: , Rfl:     Assessment & Plan  1. Weight management.  - Weight gain potentially linked to hormonal changes associated with perimenopause.  - Previous fasting regimen may have disrupted metabolism, contributing to weight gain despite minimal food intake.  - Advised against taking Ozempic as it primarily reduces appetite, which is not the current issue. Referral to a nutritionist for dietary counseling and metabolic balance.  - Blood work ordered to assess thyroid function, vitamin levels, blood sugar, cholesterol, kidney, and liver function. Encouraged to increase physical activity and calorie intake to promote fat burning and muscle building.         Plan of care reviewed with the patient at the conclusion of today's visit.  Education was provided regarding diagnosis, management, and any prescribed or recommended OTC medications.  Patient verbalized understanding of and agreement with management plan.     Return if symptoms worsen or fail to improve.      Transcribed from ambient dictation for MACY Birmingham by MACY Birmingham.  05/23/25   10:47 EDT    Patient or patient representative verbalized consent for the use of Ambient Listening during the visit with  MACY Birmingham for chart documentation. 6/1/2025  22:04 EDT

## 2025-05-27 LAB
THYROGLOB AB SERPL-ACNC: 1 IU/ML (ref 0–0.9)
THYROPEROXIDASE AB SERPL-ACNC: 12 IU/ML (ref 0–34)
TSH RECEP AB SER-ACNC: <1.1 IU/L (ref 0–1.75)

## 2025-05-28 LAB — TSI SER-ACNC: <0.1 IU/L (ref 0–0.55)

## 2025-06-02 ENCOUNTER — PROCEDURE VISIT (OUTPATIENT)
Dept: OBSTETRICS AND GYNECOLOGY | Facility: CLINIC | Age: 45
End: 2025-06-02
Payer: COMMERCIAL

## 2025-06-02 VITALS
WEIGHT: 222 LBS | BODY MASS INDEX: 34.84 KG/M2 | HEIGHT: 67 IN | DIASTOLIC BLOOD PRESSURE: 82 MMHG | SYSTOLIC BLOOD PRESSURE: 120 MMHG

## 2025-06-02 DIAGNOSIS — N92.0 MENORRHAGIA WITH REGULAR CYCLE: Primary | ICD-10-CM

## 2025-06-02 DIAGNOSIS — Z53.21 PATIENT LEFT WITHOUT BEING SEEN: ICD-10-CM

## 2025-06-02 LAB
B-HCG UR QL: NEGATIVE
EXPIRATION DATE: NORMAL
INTERNAL NEGATIVE CONTROL: NEGATIVE
INTERNAL POSITIVE CONTROL: POSITIVE
Lab: NORMAL

## 2025-06-13 ENCOUNTER — TELEPHONE (OUTPATIENT)
Dept: INTERNAL MEDICINE | Age: 45
End: 2025-06-13

## 2025-06-13 PROBLEM — G89.29 CHRONIC PAIN: Status: ACTIVE | Noted: 2025-01-20

## 2025-06-13 PROBLEM — E66.811 CLASS 1 OBESITY WITH BODY MASS INDEX (BMI) OF 34.0 TO 34.9 IN ADULT: Status: ACTIVE | Noted: 2025-06-13

## 2025-06-13 RX ORDER — TOLMETIN SODIUM 600 MG/1
1 TABLET, FILM COATED ORAL 3 TIMES DAILY
Qty: 90 EACH | Refills: 5 | Status: SHIPPED | OUTPATIENT
Start: 2025-06-13

## 2025-06-13 NOTE — TELEPHONE ENCOUNTER
A user error has taken place: encounter opened in error, closed for administrative reasons.     Telephone Encounter by Stephanie Anaya RN, BSN at 11/07/18 09:03 AM     Author:  Stephanie Anaya RN, BSN Service:  (none) Author Type:  Registered Nurse     Filed:  11/07/18 09:04 AM Encounter Date:  11/6/2018 Status:  Signed     :  Stephanie Anaya RN, BSN (Registered Nurse)            Called and left message for Alejandra (mom of patient) to call back to discuss options.[SR1.1M]      Revision History        User Key Date/Time User Provider Type Action    > SR1.1 11/07/18 09:04 AM Stephanie Anaya RN, BSN Registered Nurse Sign    M - Manual

## 2025-06-25 ENCOUNTER — HOSPITAL ENCOUNTER (OUTPATIENT)
Dept: NUTRITION | Facility: HOSPITAL | Age: 45
Setting detail: RECURRING SERIES
Discharge: HOME | End: 2025-06-25
Payer: COMMERCIAL

## 2025-06-25 PROCEDURE — 97802 MEDICAL NUTRITION INDIV IN: CPT

## 2025-06-25 NOTE — CONSULTS
[facility] Nutrition Services          Initial 60 Minute Nutrition Visit    Date: 2025   Patient Name: April Nickie Barnes  : 1980   MRN: [id number]   Referring Provider: Lazarus Hirsch*    Reason for Visit: Wt mgt  Visit Format: In person    Nutrition Assessment       Social History:   Social History     Socioeconomic History    Marital status:     Number of children: 1   Tobacco Use    Smoking status: Former     Current packs/day: 0.00     Average packs/day: 0.5 packs/day for 15.0 years (7.5 ttl pk-yrs)     Types: Cigarettes     Start date:      Quit date:      Years since quittin.4     Passive exposure: Past    Smokeless tobacco: Never   Vaping Use    Vaping status: Some Days    Substances: Nicotine   Substance and Sexual Activity    Alcohol use: No    Drug use: Never    Sexual activity: Yes     Partners: Male     Comment: partner infertile     Active Problem List:   Patient Active Problem List    Diagnosis     Class 1 obesity with body mass index (BMI) of 34.0 to 34.9 in adult [E66.811, Z68.34]     Abnormal uterine bleeding (AUB) [N93.9]     Dysmenorrhea [N94.6]     Pap test, as part of routine gynecological examination [Z01.419]     Irregular menses [N92.6]     Chronic pain [G89.29]     Dermatophytosis [B35.9]     Paresthesia [R20.2]     Pain of lower extremity [M79.606]     Neuritis or radiculitis due to rupture of lumbar intervertebral disc [M51.16]     Muscle pain [M79.10]     Low back pain [M54.50]     Hemorrhoids [K64.9]     Anxiety and depression [F41.9, F32.A]     Malignant neoplasm of upper-outer quadrant of right breast in female, estrogen receptor positive [C50.411, Z17.0]     Chronic allergic rhinitis [J30.9]     Plantar fasciitis [M72.2]     Onychomycosis [B35.1]     Degeneration of intervertebral disc of lumbar region [M51.369]     Eczema [L30.9]     Endometriosis [N80.9]     Acute gastritis [K29.00]     Insomnia [G47.00]       Current  Medications:   Current Outpatient Medications:     Ascorbic Acid (VITAMIN C GUMMIES PO), Take  by mouth., Disp: , Rfl:     cyclobenzaprine (FLEXERIL) 5 MG tablet, Take 1 tablet by mouth 2 (Two) Times a Day As Needed for Muscle Spasms., Disp: 60 tablet, Rfl: 2    famotidine (PEPCID) 20 MG tablet, Take 1 tablet by mouth twice daily OR take 2 tablets by mouth once daily as needed for heartburn, Disp: 60 tablet, Rfl: 5    gabapentin (NEURONTIN) 300 MG capsule, Take 1 capsule by mouth 3 (Three) Times a Day As Needed (back and neck pain)., Disp: 90 capsule, Rfl: 2    Multiple Vitamins-Minerals (WOMENS MULTIVITAMIN + COLLAGEN PO), Take  by mouth., Disp: , Rfl:     multivitamin (Multiple Vitamin) tablet tablet, Take 1 tablet by mouth Daily., Disp: 30 tablet, Rfl: 11    Omega-3 Fatty Acids (fish oil) 1000 MG capsule capsule, Take  by mouth Daily With Breakfast., Disp: , Rfl:     omeprazole (priLOSEC) 40 MG capsule, Take 1 capsule by mouth Daily., Disp: 30 capsule, Rfl: 5    ondansetron ODT (ZOFRAN-ODT) 8 MG disintegrating tablet, Take 1/2 to 1 tablet by mouth (dissolve under tongue or swallow) every 8 hours as needed for nausea., Disp: 30 tablet, Rfl: 1    SUMAtriptan (Imitrex) 100 MG tablet, Take one tablet at onset of headache. May repeat dose one time in 2 hours if headache not relieved., Disp: 9 tablet, Rfl: 1    tolmetin (TOLECTIN) 400 MG capsule, Take 1 capsule by mouth 3 (Three) Times a Day As Needed (neck and joint pain)., Disp: 90 capsule, Rfl: 1    Tolmetin Sodium 600 MG tablet, Take one tablet BY MOUTH THREE TIMES DAILY, Disp: 90 each, Rfl: 5    traMADol (ULTRAM) 50 MG tablet, Take 1 tablet by mouth 3 (Three) Times a Day As Needed for Moderate Pain or Severe Pain. moderate pain, Disp: 90 tablet, Rfl: 2    traZODone (DESYREL) 100 MG tablet, TAKE ONE TO TWO TABLETS BY MOUTH NIGHTLY ONE HOUR BEFORE BEDTIME AS NEEDED FOR SLEEP, Disp: 60 tablet, Rfl: 3    Vitamin D-Vitamin K (D3 + K2) 125-100 MCG capsule, Take  by  mouth., Disp: , Rfl:     vitamin E 200 UNIT capsule, Take 1 capsule by mouth Daily. (Patient not taking: Reported on 2025), Disp: , Rfl:     Labs: Labs reviewed. A1c WNL, Chol 207 High, HDL 66,  High    Hunger Vital Sign Food Insecurity Assessment:  Within the past 12 months I/we worried whether our food would run out before I/we got money to buy more: No   Within the past 12 months the food I/we bought just didn't last and I/we didn't have money to get more: No   Use of food assistance programs (WIC, food stamps, food hoff) SNAP       Food & Nutrition Related History       Food Allergies: None  Food Intolerances: Mild lactose intolerance, can eat cheese and yogurt  Food Behavior: Fasting, eats one meal per day  Nutrition Impact Symptoms: None  Gastrointestinal conditions that impact intake or food choices: None  Details at home: Pt lives with daughter  Who prepares most meals: Pt   Who does grocery shopping: Pt  How many meals are purchased from fast food/sit down restaurants per week: Did not ask at this visit  Difficulty chewin - Normal  Difficulty swallowin - Normal  Diet requirement related to personal preference or cultural belief: Prefers to eat one meal per day  History of eating disorder/disordered eating habits: Eats one meal per day.  Language/communication details: English  Barriers to learning: No barriers identified at this time    24 Hour Recall:   Time Food/beverages consumed   Breakfast Protein shake       Lunch/Dinner Chicken and vegetable       Snack Protein bar                 Additional comments: Pt and daughter were present for visit in person. PMH reviewed. Pt began gaining wt after pregnancy when she was 34 years old. Pt was dx'd with breast CA in  and again in .She began intermittent fasting and lost 100# and maintained the wt loss for several years. In , she began to re-gain wt even though no changes were made to diet or exercise routine. Pt believes that  "the wt gain was related to hormonal changes and perimenopause. Pt began a \"hormone reset plan\" through diet recently and \"cut out all sugar.\" She has lost 11# since cutting out sugar. Pt plans to continue cutting out all sugar for about a month, but would like to have a long-term healthy eating plan that is less restrictive.     Anthropometrics      Height:   Ht Readings from Last 1 Encounters:   06/02/25 170.2 cm (67\")     Weight:   Wt Readings from Last 3 Encounters:   06/02/25 101 kg (222 lb)   05/23/25 102 kg (223 lb 12.8 oz)   05/05/25 103 kg (227 lb 6.4 oz)     BMI: 34.77  Weight Change: Pt is down 11# recently.      Physical Activity     Barriers to physical activity: None     Physical activity comments: Pt was walking about 2 miles per day and using a trampoline for more cardio exercise. She thought that she might be getting too much exercise, so she is currently walking 1 mile per day and doing 3 days of trampoline exercise and stretching.     Estimated Needs     Estimated Energy Needs: 5515-5143 kcal (1.1, -300)    Estimated Protein Needs:  (0.8-1.0 g/kg)     Estimated Fluid Needs: At least 64 oz per day.     Discussion / Education      We discussed the Mediterranean Diet as a way of healthful eating that will help pt meet her nutritional needs and has been shown in research to be easier to adhere to long-term. RD recommended focusing on fruits and vegetables, whole grains, olive oil, fish or chicken, low fat dairy, and drinking plenty of water. Pt stated that this sounds exactly like what she was looking for and is excited to add whole grains and more fish.    Pt verbalized understanding of our discussion, has no further questions at this time, and set the goals listed below.    At the next visit, we will review the goals pt set and further discuss meeting nutritional needs by adding additional meals and snacks as needed.    Assessment of patient engagement: Asked appropriate " questions    Measurement of understanding: Patient verbalized understanding    Resources Provided:   Mediterranean Diet    Goal (s)      Goal 1: Increase lean meat such as fish to 4-5 x per week and decrease red meat intake to 2-3 x per week    Goal 2: Add whole grains and fruits as nutrient-dense carbohydrate sources to better meet nutritional needs     Plan of Care     PES Statement:   Inadequate intake related to frequency of meals and current restrictions as evidenced by dietary recall and interview.     Follow Up Visit      Follow Up:   8/20/25 @ 1:00 PM    Total of 60 minutes spent with patient on nutrition counseling. Education based on Academy of Nutrition and Dietetics guidelines. Patient was provided with RD's contact information. Thank you for this referral.

## 2025-07-07 ENCOUNTER — TELEPHONE (OUTPATIENT)
Dept: OBSTETRICS AND GYNECOLOGY | Facility: CLINIC | Age: 45
End: 2025-07-07
Payer: COMMERCIAL

## 2025-07-07 NOTE — TELEPHONE ENCOUNTER
Patient of Dr. Jim; LOV 06/02/25; patient left without being seen.  Returned patient's call.   She is scheduled for EMB 07/14/25; asking if she needs someone to drive her.  Informed her she should be able to drive herself. She v/u.

## 2025-07-17 ENCOUNTER — OFFICE VISIT (OUTPATIENT)
Dept: INTERNAL MEDICINE | Age: 45
End: 2025-07-17
Payer: COMMERCIAL

## 2025-07-17 VITALS
BODY MASS INDEX: 35.19 KG/M2 | OXYGEN SATURATION: 98 % | TEMPERATURE: 96.9 F | SYSTOLIC BLOOD PRESSURE: 118 MMHG | HEIGHT: 67 IN | HEART RATE: 85 BPM | WEIGHT: 224.2 LBS | DIASTOLIC BLOOD PRESSURE: 98 MMHG

## 2025-07-17 DIAGNOSIS — E66.01 CLASS 2 SEVERE OBESITY WITH SERIOUS COMORBIDITY AND BODY MASS INDEX (BMI) OF 35.0 TO 35.9 IN ADULT, UNSPECIFIED OBESITY TYPE: Primary | ICD-10-CM

## 2025-07-17 DIAGNOSIS — M54.41 CHRONIC BILATERAL LOW BACK PAIN WITH BILATERAL SCIATICA: ICD-10-CM

## 2025-07-17 DIAGNOSIS — E66.812 CLASS 2 SEVERE OBESITY WITH SERIOUS COMORBIDITY AND BODY MASS INDEX (BMI) OF 35.0 TO 35.9 IN ADULT, UNSPECIFIED OBESITY TYPE: Primary | ICD-10-CM

## 2025-07-17 DIAGNOSIS — M54.42 CHRONIC BILATERAL LOW BACK PAIN WITH BILATERAL SCIATICA: ICD-10-CM

## 2025-07-17 DIAGNOSIS — N80.9 ENDOMETRIOSIS: ICD-10-CM

## 2025-07-17 DIAGNOSIS — D25.9 UTERINE LEIOMYOMA, UNSPECIFIED LOCATION: ICD-10-CM

## 2025-07-17 DIAGNOSIS — N95.1 MENOPAUSAL SYMPTOMS: ICD-10-CM

## 2025-07-17 DIAGNOSIS — R10.2 PELVIC PAIN: ICD-10-CM

## 2025-07-17 DIAGNOSIS — M54.2 CERVICAL PAIN: ICD-10-CM

## 2025-07-17 DIAGNOSIS — G89.29 CHRONIC BILATERAL LOW BACK PAIN WITH BILATERAL SCIATICA: ICD-10-CM

## 2025-07-17 PROCEDURE — 99214 OFFICE O/P EST MOD 30 MIN: CPT | Performed by: NURSE PRACTITIONER

## 2025-07-17 PROCEDURE — 1126F AMNT PAIN NOTED NONE PRSNT: CPT | Performed by: NURSE PRACTITIONER

## 2025-07-17 RX ORDER — TRAMADOL HYDROCHLORIDE 50 MG/1
50 TABLET ORAL 3 TIMES DAILY PRN
Qty: 90 TABLET | Refills: 2 | Status: SHIPPED | OUTPATIENT
Start: 2025-07-17

## 2025-07-17 RX ORDER — DIPHENOXYLATE HYDROCHLORIDE AND ATROPINE SULFATE 2.5; .025 MG/1; MG/1
1 TABLET ORAL DAILY
Qty: 90 TABLET | Refills: 1 | Status: SHIPPED | OUTPATIENT
Start: 2025-07-17

## 2025-07-17 RX ORDER — CYCLOBENZAPRINE HCL 5 MG
5 TABLET ORAL 2 TIMES DAILY PRN
Qty: 60 TABLET | Refills: 2 | Status: SHIPPED | OUTPATIENT
Start: 2025-07-17

## 2025-07-17 RX ORDER — GABAPENTIN 300 MG/1
300 CAPSULE ORAL 3 TIMES DAILY PRN
Qty: 90 CAPSULE | Refills: 2 | Status: SHIPPED | OUTPATIENT
Start: 2025-07-17

## 2025-07-17 NOTE — PROGRESS NOTES
Subjective   Chief Complaint   Patient presents with    [facility]        April Nickie Barnes is a 45 y.o. female.    History of Present Illness  The patient presents for weight management, neck pain, and fibroids.    She has been experiencing emotional distress due to the recent loss of her grandmother, which she believes has impacted her weight loss efforts. Despite not losing weight, she reports feeling better overall. She has discontinued fasting and now consumes a small amount of food once daily. She has also eliminated sugar from her diet, except for a few supplements, and has reduced her carbohydrate intake. Her exercise routine has been modified from intense cardio to walking 2 miles daily. Initially, she lost some weight, dropping to 217 pounds, but has since regained some weight. She feels that she is losing inches even though she is not losing weight. She has resumed waist training, which she finds helpful. She is trying not to focus on the weight and focus on how she looks and feels. She is trying to moderate her anxiety. She is planning to switch to a Mediterranean diet after a two-month period of resetting her metabolism. She has been taking JAYDE menopause vitamins for the past 2 to 3 weeks, which she believes have helped with hot flashes.    She has been dealing with neck pain for an extended period. Physical therapy provided some relief, but it was insufficient. She received an injection, which initially caused significant pain when she returned to physical therapy four days later. However, two weeks after the injection, she experienced a period of 2.5 months without pain. When the effects of the injection began to wear off, she considered another injection but then noticed a reduction in pain. She no longer experiences throat swelling but does have evening arm pain and soreness. She consulted a surgeon who suggested either continued injections or surgery. She decided against another injection due  to the reduced pain and hopes to avoid surgery. She believes her condition is improving and plans to discuss her arm pain with the surgeon if it persists in 6 to 8 months. She has stopped carrying heavy trays at work, which she thinks contributed to her pain. She suspects she may have ruptured or herniated a disc while lifting heavy trays. She is currently focusing on strengthening her neck through resistance training with bands. She takes tramadol daily, usually in the evenings when her neck pain is most severe. She also takes tolmetin, gabapentin, Flexeril, omeprazole, trazodone, multivitamins, metformin 500 mg once daily, Cialis for circulation issues, and supplements including B12, vitamin C, biotin, and magnesium glycinate. She has discontinued iron supplements due to their potential link to cancer and tumors. She has resumed eating meat after avoiding it for 12 years due to concerns about meat replacements and breast cancer.    An ultrasound revealed three large fibroids, and she has not menstruated since 02/2025. She experiences daily fibroid pain and cramps, which are particularly severe in the afternoons and evenings. She is scheduled for a biopsy of one of the fibroids next week.    Social History:  Diet: She has eliminated sugar from her diet, except for a few supplements, and has reduced her carbohydrate intake. She plans to switch to a Mediterranean diet after a two-month period of resetting her metabolism.    GYNECOLOGICAL HISTORY:  Last Menstrual Period: 02/2025  Menstrual Pain: She experiences daily fibroid pain and cramps, which are particularly severe in the afternoons and evenings.    I have reviewed the following portions of the patient's history and confirmed they are accurate: allergies, current medications, past family history, past medical history, past social history, past surgical history, and problem list    Review of Systems  Pertinent items are noted in HPI.     Current Outpatient  "Medications on File Prior to Visit   Medication Sig    Ascorbic Acid (VITAMIN C GUMMIES PO) Take  by mouth.    famotidine (PEPCID) 20 MG tablet Take 1 tablet by mouth twice daily OR take 2 tablets by mouth once daily as needed for heartburn    metFORMIN (GLUCOPHAGE) 500 MG tablet Take 1 tablet by mouth Daily With Breakfast.    Omega-3 Fatty Acids (fish oil) 1000 MG capsule capsule Take  by mouth Daily With Breakfast.    omeprazole (priLOSEC) 40 MG capsule Take 1 capsule by mouth Daily.    ondansetron ODT (ZOFRAN-ODT) 8 MG disintegrating tablet Take 1/2 to 1 tablet by mouth (dissolve under tongue or swallow) every 8 hours as needed for nausea.    SUMAtriptan (Imitrex) 100 MG tablet Take one tablet at onset of headache. May repeat dose one time in 2 hours if headache not relieved.    tolmetin (TOLECTIN) 400 MG capsule Take 1 capsule by mouth 3 (Three) Times a Day As Needed (neck and joint pain). (Patient not taking: Reported on 7/23/2025)    Tolmetin Sodium 600 MG tablet Take one tablet BY MOUTH THREE TIMES DAILY    traZODone (DESYREL) 100 MG tablet TAKE ONE TO TWO TABLETS BY MOUTH NIGHTLY ONE HOUR BEFORE BEDTIME AS NEEDED FOR SLEEP    Vitamin D-Vitamin K (D3 + K2) 125-100 MCG capsule Take  by mouth.    vitamin E 200 UNIT capsule Take 1 capsule by mouth Daily. (Patient not taking: Reported on 7/23/2025)     No current facility-administered medications on file prior to visit.       Objective   Vitals:    07/17/25 1316   BP: 118/98   Pulse: 85   Temp: 96.9 °F (36.1 °C)   TempSrc: Temporal   SpO2: 98%   Weight: 102 kg (224 lb 3.2 oz)   Height: 170.2 cm (67\")     Body mass index is 35.11 kg/m².    Physical Exam  Vitals reviewed.   Constitutional:       Appearance: Normal appearance. She is well-developed.   HENT:      Head: Normocephalic and atraumatic.      Nose: Nose normal.   Eyes:      General: Lids are normal.      Conjunctiva/sclera: Conjunctivae normal.      Pupils: Pupils are equal, round, and reactive to light. "   Neck:      Thyroid: No thyromegaly.      Trachea: Trachea normal.   Pulmonary:      Effort: Pulmonary effort is normal. No respiratory distress.   Skin:     General: Skin is warm and dry.   Neurological:      Mental Status: She is alert and oriented to person, place, and time.      GCS: GCS eye subscore is 4. GCS verbal subscore is 5. GCS motor subscore is 6.   Psychiatric:         Attention and Perception: Attention normal.         Mood and Affect: Mood normal.         Speech: Speech normal.         Behavior: Behavior normal. Behavior is cooperative.         Results  Imaging   - Ultrasound: Three fibroids found, two of them 24 mm and one is 22 mm    Lab Results   Component Value Date    WBC 5.16 05/23/2025    HGB 13.8 05/23/2025    HCT 40.3 05/23/2025    MCV 91.8 05/23/2025     05/23/2025      Lab Results   Component Value Date    GLUCOSE 84 05/23/2025    BUN 18 05/23/2025    CREATININE 0.61 05/23/2025     05/23/2025    K 4.4 05/23/2025     05/23/2025    CALCIUM 9.7 05/23/2025    PROTEINTOT 7.4 05/23/2025    ALBUMIN 4.5 05/23/2025    ALT 19 05/23/2025    AST 26 05/23/2025    ALKPHOS 89 05/23/2025    BILITOT 0.3 05/23/2025    GLOB 2.9 05/23/2025    AGRATIO 1.6 05/23/2025    BCR 29.5 (H) 05/23/2025    ANIONGAP 12.0 05/23/2025    EGFR 111.1 05/23/2025      Lab Results   Component Value Date    HGBA1C 5.10 05/23/2025      Lab Results   Component Value Date    CHOL 207 (H) 05/23/2025    TRIG 101 05/23/2025    HDL 66 (H) 05/23/2025     (H) 05/23/2025      Lab Results   Component Value Date    TSH 2.660 05/23/2025          Assessment & Plan   Problem List Items Addressed This Visit       Endometriosis    Relevant Medications    traMADol (ULTRAM) 50 MG tablet    cyclobenzaprine (FLEXERIL) 5 MG tablet    Low back pain    Relevant Medications    traMADol (ULTRAM) 50 MG tablet    gabapentin (NEURONTIN) 300 MG capsule    cyclobenzaprine (FLEXERIL) 5 MG tablet     Other Visit Diagnoses         Class  2 severe obesity with serious comorbidity and body mass index (BMI) of 35.0 to 35.9 in adult, unspecified obesity type    -  Primary      Cervical pain        Relevant Medications    traMADol (ULTRAM) 50 MG tablet    gabapentin (NEURONTIN) 300 MG capsule    cyclobenzaprine (FLEXERIL) 5 MG tablet      Uterine leiomyoma, unspecified location        Relevant Medications    cyclobenzaprine (FLEXERIL) 5 MG tablet      Menopausal symptoms          Pelvic pain        Relevant Medications    cyclobenzaprine (FLEXERIL) 5 MG tablet               Current Outpatient Medications:     cyclobenzaprine (FLEXERIL) 5 MG tablet, Take 1 tablet by mouth 2 (Two) Times a Day As Needed for Muscle Spasms., Disp: 60 tablet, Rfl: 2    gabapentin (NEURONTIN) 300 MG capsule, Take 1 capsule by mouth 3 (Three) Times a Day As Needed (back and neck pain)., Disp: 90 capsule, Rfl: 2    multivitamin (Multiple Vitamin) tablet tablet, Take 1 tablet by mouth Daily., Disp: 90 tablet, Rfl: 1    traMADol (ULTRAM) 50 MG tablet, Take 1 tablet by mouth 3 (Three) Times a Day As Needed for Moderate Pain or Severe Pain. moderate pain, Disp: 90 tablet, Rfl: 2    Ascorbic Acid (VITAMIN C GUMMIES PO), Take  by mouth., Disp: , Rfl:     Biotin 1 MG capsule, Take  by mouth., Disp: , Rfl:     docusate sodium (COLACE) 50 MG capsule, Take 1 capsule by mouth 2 (Two) Times a Day., Disp: , Rfl:     Elagolix Sodium (Orilissa) 200 MG tablet, Take 200 mg by mouth 2 (Two) Times a Day., Disp: 56 tablet, Rfl: 5    famotidine (PEPCID) 20 MG tablet, Take 1 tablet by mouth twice daily OR take 2 tablets by mouth once daily as needed for heartburn, Disp: 60 tablet, Rfl: 5    metFORMIN (GLUCOPHAGE) 500 MG tablet, Take 1 tablet by mouth Daily With Breakfast., Disp: , Rfl:     Nutritional Supplements (WOMENS FORMULA MENOPAUSE PO), Take  by mouth., Disp: , Rfl:     Omega-3 Fatty Acids (fish oil) 1000 MG capsule capsule, Take  by mouth Daily With Breakfast., Disp: , Rfl:     omeprazole  (priLOSEC) 40 MG capsule, Take 1 capsule by mouth Daily., Disp: 30 capsule, Rfl: 5    ondansetron ODT (ZOFRAN-ODT) 8 MG disintegrating tablet, Take 1/2 to 1 tablet by mouth (dissolve under tongue or swallow) every 8 hours as needed for nausea., Disp: 30 tablet, Rfl: 1    SUMAtriptan (Imitrex) 100 MG tablet, Take one tablet at onset of headache. May repeat dose one time in 2 hours if headache not relieved., Disp: 9 tablet, Rfl: 1    tadalafil (Cialis) 5 MG tablet, Take 1 tablet by mouth Daily As Needed for Erectile Dysfunction., Disp: , Rfl:     tolmetin (TOLECTIN) 400 MG capsule, Take 1 capsule by mouth 3 (Three) Times a Day As Needed (neck and joint pain). (Patient not taking: Reported on 7/23/2025), Disp: 90 capsule, Rfl: 1    Tolmetin Sodium 600 MG tablet, Take one tablet BY MOUTH THREE TIMES DAILY, Disp: 90 each, Rfl: 5    traZODone (DESYREL) 100 MG tablet, TAKE ONE TO TWO TABLETS BY MOUTH NIGHTLY ONE HOUR BEFORE BEDTIME AS NEEDED FOR SLEEP, Disp: 60 tablet, Rfl: 3    Vitamin D-Vitamin K (D3 + K2) 125-100 MCG capsule, Take  by mouth., Disp: , Rfl:     vitamin E 200 UNIT capsule, Take 1 capsule by mouth Daily. (Patient not taking: Reported on 7/23/2025), Disp: , Rfl:     Assessment & Plan  1. Weight management.  - She has ceased fasting and has reduced sugar and carbohydrate intake, leading to improved energy and stamina.  - Waist training has resulted in a reduction in inches despite stable weight.  - Physical examination reveals no significant weight loss, but improved fit of clothing and overall well-being.  - A Mediterranean diet has been recommended, and she plans to transition after completing a metabolism reset.    2. Neck pain.  - Pain has significantly reduced following cessation of physical therapy and heavy lifting.  - Previous injection provided relief for 2.5 months; current pain is manageable without further injections.  - Continued use of tramadol, gabapentin, Flexeril, and tolmetin for pain  management.  - Resistance training exercises with bands are advised to strengthen neck muscles.    3. Fibroids.  - Ultrasound identified three fibroids (24 mm, 24 mm, and 22 mm), causing daily cramps and pain.  - She is scheduled for a biopsy next week after two previous unsuccessful attempts.  - Discussion with gynecologist regarding fibroid-related pain and potential removal options based on size and symptoms.  - Use of [employer]irt brand mellow menopause supplements has helped with hot flashes.    Follow-up  The patient will follow up in 3 months.         Plan of care reviewed with the patient at the conclusion of today's visit.  Education was provided regarding diagnosis, management, and any prescribed or recommended OTC medications.  Patient verbalized understanding of and agreement with management plan.     Return in about 3 months (around 10/17/2025), or if symptoms worsen or fail to improve.      Transcribed from ambient dictation for MACY Birmingham by MACY Birmingham.  07/17/25   13:49 EDT    Patient or patient representative verbalized consent for the use of Ambient Listening during the visit with  MACY Birmingham for chart documentation. 7/24/2025  14:54 EDT

## 2025-07-23 ENCOUNTER — PROCEDURE VISIT (OUTPATIENT)
Dept: OBSTETRICS AND GYNECOLOGY | Facility: CLINIC | Age: 45
End: 2025-07-23
Payer: COMMERCIAL

## 2025-07-23 VITALS
DIASTOLIC BLOOD PRESSURE: 82 MMHG | SYSTOLIC BLOOD PRESSURE: 128 MMHG | WEIGHT: 220.4 LBS | BODY MASS INDEX: 34.59 KG/M2 | HEIGHT: 67 IN

## 2025-07-23 DIAGNOSIS — N93.9 ABNORMAL UTERINE BLEEDING (AUB): Primary | ICD-10-CM

## 2025-07-23 DIAGNOSIS — C50.411 MALIGNANT NEOPLASM OF UPPER-OUTER QUADRANT OF RIGHT BREAST IN FEMALE, ESTROGEN RECEPTOR POSITIVE: ICD-10-CM

## 2025-07-23 DIAGNOSIS — D25.0 FIBROIDS, SUBMUCOSAL: ICD-10-CM

## 2025-07-23 DIAGNOSIS — N80.9 ENDOMETRIOSIS: ICD-10-CM

## 2025-07-23 DIAGNOSIS — Z17.0 MALIGNANT NEOPLASM OF UPPER-OUTER QUADRANT OF RIGHT BREAST IN FEMALE, ESTROGEN RECEPTOR POSITIVE: ICD-10-CM

## 2025-07-23 RX ORDER — TADALAFIL 5 MG/1
5 TABLET ORAL DAILY PRN
COMMUNITY

## 2025-07-23 RX ORDER — ELAGOLIX 200 MG/1
200 TABLET, FILM COATED ORAL 2 TIMES DAILY
Qty: 56 TABLET | Refills: 5 | Status: SHIPPED | OUTPATIENT
Start: 2025-07-23

## 2025-07-23 RX ORDER — NICOTINE POLACRILEX 2 MG
GUM BUCCAL
COMMUNITY

## 2025-07-23 NOTE — PROGRESS NOTES
Gynecologic Procedure Note        Endometrial Biopsy      Indications: April Nickie Barnes is a 45 y.o. , who presents today for endometrial biopsy only (patient previously evaluated by Alley Jim MD and EMB was previously recommended).  The patient was noted to have AUB, irregular cycles, dysmenorrhea, and fibroids. No LMP recorded. Patient is perimenopausal.  After being presented with the risk, benefits, and specific detail of the procedure, the patient wished to proceed.  Written consent was obtained from patient.   Urine pregnancy test was Negative. Patient does not have an allergy to betadine or shellfish. Patient would like to discuss sometimes moderate to severe abdominal cramping and pain that happens every day now.      Procedure Details     Time out: immediate members of the procedure team and patient agree to the following: correct patient, correct site, correct procedure to be performed. Alley Jim MD      The patient was placed on the table in the dorsal lithotomy position.  She was draped in the appropriate manner.  A speculum was placed in the vagina.  The cervix was visualized and prepped with Betadine.  A tenaculum was placed on the anterior lip of the cervix for traction.  A small plastic 5 mm Pipelle syringe curette was inserted into the cervical canal.  The uterus was sounded to 7 cm's.  A vigorous four quadrant biopsy was performed, removing a small amount of tissue.  The tissue was placed in Formalin and sent to Pathology.  The patient tolerated the procedure well and she reported moderate cramping.  The tenaculum was removed from the cervix and the speculum was removed.  The patient was observed for 10 minutes.           Complications: none.     Procedures    Review of Systems   Constitutional: Negative.    HENT: Negative.     Eyes: Negative.    Respiratory: Negative.     Cardiovascular: Negative.    Gastrointestinal: Negative.    Endocrine: Negative.   "  Genitourinary: Negative.    Musculoskeletal: Negative.    Skin: Negative.    Allergic/Immunologic: Negative.    Neurological: Negative.    Hematological: Negative.    Psychiatric/Behavioral: Negative.       /82   Ht 170.2 cm (67\")   Wt 100 kg (220 lb 6.4 oz)   BMI 34.52 kg/m²     Physical Exam:  General:  well developed; well nourished  no acute distress  mentation appropriate  obese - Body mass index is 34.52 kg/m².   Abdomen: soft, non-tender; no masses  no umbilical or inguinal hernias are present   Pelvis: Clinical staff was present for exam  External genitalia:  normal appearance of the external genitalia including [place]th[place]'s and [place]'s glands.  :  urethral meatus normal; urethra normal:  Vaginal:  normal pink mucosa without prolapse or lesions.  Cervix:  normal appearance.  Uterus:  ?retroverted, enlarged, mobile  Adnexa:  normal bimanual exam of the adnexa.        Plan:  No orders of the defined types were placed in this encounter.      Problem List Items Addressed This Visit       Endometriosis    Malignant neoplasm of upper-outer quadrant of right breast in female, estrogen receptor positive    Abnormal uterine bleeding (AUB) - Primary    Relevant Orders    TISSUE EXAM, [facility] ([facility])    Fibroids, submucosal           Instructions  Call the office in 5 business days for biopsy results.  Patient instructed to call the office if develops a fever of 100.4 or greater, vaginal bleeding heavier than a period, foul vaginal discharge or pain.  Had period in Feb and then hasn't had a period the rest of this year so far.  Has gone from extremely painful periods to less pain but all the time.  She discussed the idea of a low dose progestin, but was told she cannot do that b/c of h/o breast cancer by oncologist.  I recommended lupron vs. Orilissa considering h/o endometriosis.  If unsuccessful, she may have to proceed with hysterectomy.  But as she is having less periods, hoping to coast into " menopause.      Alley Jim MD  07/23/2025

## 2025-07-24 LAB — REF LAB TEST METHOD: NORMAL

## 2025-07-25 DIAGNOSIS — K21.00 GASTROESOPHAGEAL REFLUX DISEASE WITH ESOPHAGITIS WITHOUT HEMORRHAGE: ICD-10-CM

## 2025-07-25 RX ORDER — OMEPRAZOLE 40 MG/1
40 CAPSULE, DELAYED RELEASE ORAL DAILY
Qty: 90 CAPSULE | Refills: 0 | Status: SHIPPED | OUTPATIENT
Start: 2025-07-25

## 2025-08-04 ENCOUNTER — TELEPHONE (OUTPATIENT)
Dept: INTERNAL MEDICINE | Age: 45
End: 2025-08-04
Payer: COMMERCIAL

## 2025-08-11 DIAGNOSIS — N93.9 ABNORMAL UTERINE BLEEDING (AUB): Primary | ICD-10-CM

## 2025-08-11 DIAGNOSIS — N84.0 ENDOMETRIAL POLYP: ICD-10-CM

## 2025-08-20 ENCOUNTER — HOSPITAL ENCOUNTER (OUTPATIENT)
Dept: NUTRITION | Facility: HOSPITAL | Age: 45
Discharge: HOME | End: 2025-08-20
Payer: COMMERCIAL